# Patient Record
Sex: FEMALE | Race: WHITE | NOT HISPANIC OR LATINO | Employment: OTHER | URBAN - METROPOLITAN AREA
[De-identification: names, ages, dates, MRNs, and addresses within clinical notes are randomized per-mention and may not be internally consistent; named-entity substitution may affect disease eponyms.]

---

## 2023-06-08 DIAGNOSIS — F32.A DEPRESSION, UNSPECIFIED DEPRESSION TYPE: Primary | ICD-10-CM

## 2023-06-08 RX ORDER — PAROXETINE HYDROCHLORIDE 20 MG/1
TABLET, FILM COATED ORAL
Qty: 135 TABLET | Refills: 0 | Status: SHIPPED | OUTPATIENT
Start: 2023-06-08 | End: 2023-10-13

## 2023-06-20 ENCOUNTER — HOSPITAL ENCOUNTER (OUTPATIENT)
Dept: DATA CONVERSION | Facility: HOSPITAL | Age: 88
End: 2023-06-20
Attending: INTERNAL MEDICINE | Admitting: INTERNAL MEDICINE
Payer: MEDICARE

## 2023-06-20 DIAGNOSIS — R19.7 DIARRHEA, UNSPECIFIED: ICD-10-CM

## 2023-06-20 DIAGNOSIS — I25.2 OLD MYOCARDIAL INFARCTION: ICD-10-CM

## 2023-06-20 DIAGNOSIS — K57.30 DIVERTICULOSIS OF LARGE INTESTINE WITHOUT PERFORATION OR ABSCESS WITHOUT BLEEDING: ICD-10-CM

## 2023-06-20 DIAGNOSIS — I10 ESSENTIAL (PRIMARY) HYPERTENSION: ICD-10-CM

## 2023-06-20 DIAGNOSIS — E11.9 TYPE 2 DIABETES MELLITUS WITHOUT COMPLICATIONS (MULTI): ICD-10-CM

## 2023-06-20 DIAGNOSIS — E07.9 DISORDER OF THYROID, UNSPECIFIED: ICD-10-CM

## 2023-06-20 DIAGNOSIS — E78.00 PURE HYPERCHOLESTEROLEMIA, UNSPECIFIED: ICD-10-CM

## 2023-06-20 DIAGNOSIS — K64.4 RESIDUAL HEMORRHOIDAL SKIN TAGS: ICD-10-CM

## 2023-06-20 DIAGNOSIS — C18.2 MALIGNANT NEOPLASM OF ASCENDING COLON (MULTI): ICD-10-CM

## 2023-06-20 DIAGNOSIS — R10.32 LEFT LOWER QUADRANT PAIN: ICD-10-CM

## 2023-06-20 DIAGNOSIS — K63.3 ULCER OF INTESTINE: ICD-10-CM

## 2023-06-20 LAB — POCT GLUCOSE: 113 MG/DL (ref 74–99)

## 2023-06-27 LAB
COMPLETE PATHOLOGY REPORT: NORMAL
CONVERTED ADDENDUM DIAGNOSIS 2: NORMAL
CONVERTED ADDENDUM DIAGNOSIS 3: NORMAL
CONVERTED CLINICAL DIAGNOSIS-HISTORY: NORMAL
CONVERTED FINAL DIAGNOSIS: NORMAL
CONVERTED FINAL REPORT PDF LINK TO COPY AND PASTE: NORMAL
CONVERTED GROSS DESCRIPTION: NORMAL
CONVERTED PHYSICIAN NOTIFICATION: NORMAL

## 2023-08-03 DIAGNOSIS — E03.9 HYPOTHYROIDISM, UNSPECIFIED TYPE: ICD-10-CM

## 2023-08-03 RX ORDER — LEVOTHYROXINE SODIUM 112 UG/1
TABLET ORAL
Qty: 90 TABLET | Refills: 3 | Status: SHIPPED | OUTPATIENT
Start: 2023-08-03

## 2023-08-23 LAB
ALANINE AMINOTRANSFERASE (SGPT) (U/L) IN SER/PLAS: 11 U/L (ref 7–45)
ALBUMIN (G/DL) IN SER/PLAS: 4.2 G/DL (ref 3.4–5)
ALKALINE PHOSPHATASE (U/L) IN SER/PLAS: 84 U/L (ref 33–136)
ANION GAP IN SER/PLAS: 13 MMOL/L (ref 10–20)
ASPARTATE AMINOTRANSFERASE (SGOT) (U/L) IN SER/PLAS: 19 U/L (ref 9–39)
BILIRUBIN TOTAL (MG/DL) IN SER/PLAS: 0.3 MG/DL (ref 0–1.2)
CALCIUM (MG/DL) IN SER/PLAS: 10.5 MG/DL (ref 8.6–10.3)
CARBON DIOXIDE, TOTAL (MMOL/L) IN SER/PLAS: 29 MMOL/L (ref 21–32)
CARCINOEMBRYONIC AG (NG/ML) IN SER/PLAS: 2.3 UG/L
CHLORIDE (MMOL/L) IN SER/PLAS: 104 MMOL/L (ref 98–107)
CREATININE (MG/DL) IN SER/PLAS: 0.78 MG/DL (ref 0.5–1.05)
GFR FEMALE: 69 ML/MIN/1.73M2
GLUCOSE (MG/DL) IN SER/PLAS: 129 MG/DL (ref 74–99)
POTASSIUM (MMOL/L) IN SER/PLAS: 4.9 MMOL/L (ref 3.5–5.3)
PROTEIN TOTAL: 7.1 G/DL (ref 6.4–8.2)
SODIUM (MMOL/L) IN SER/PLAS: 141 MMOL/L (ref 136–145)
UREA NITROGEN (MG/DL) IN SER/PLAS: 14 MG/DL (ref 6–23)

## 2023-09-07 VITALS — WEIGHT: 124.12 LBS | HEIGHT: 66 IN | BODY MASS INDEX: 19.95 KG/M2

## 2023-10-06 NOTE — PROGRESS NOTES
FRANK Baugh is a 89 yo female who underwent a colonoscopy with Dr. Whittington on 6/20/23. She was found to have a likely malignant tumor in the proximal ascending colon. Path demonstrating invasive poorly differentiated adenocarcinoma. PMS-2 and MLH-1 absent. Colonoscopy was completed for diarrhea.     She presents today to discuss surgery that is scheduled for next week. Unfortunately her son in law passed away.     CEA 8/23/23: 2.3    CT chest 8/31/23: Colon carcinoma restaging. No metastatic disease to the lungs. Prominent right hilar lymph node.    CT a/p 7/30/23: Mild wall thickening of the distal descending and sigmoid colon, concerning for mild colitis. There is slight urothelial thickening/enhancement which is nonspecific, though may be seen with urinary tract infection/inflammation. Please correlate with urinalysis. Colonic diverticulosis without evidence of diverticulitis. Mild intrahepatic and extrahepatic biliary ductal dilation unchanged from 11/02/2022, likely secondary to prior cholecystectomy. 0.8 cm enhancing lymph node adjacent to the sigmoid colon not substantially changed from 11/02/2022, uncertain clinical significance.    Colonoscopy 6/20/23 (Pk): Likely malignant tumor in the proximal ascending colon. Biopsied. 5cms distal to the cecum. Diverticulosis in the sigmoid colon. External hemorrhoids. The sigmoid colon, descending colon and transverse colon are normal. Biopsied. The examination was otherwise normal on direct and retroflexion views. Path demonstrating invasive poorly differentiated adenocarcinoma. Positive for MLH1 promoter methylation.     Review of Systems   Constitutional:  Negative for activity change, appetite change, chills, fatigue, fever and unexpected weight change.   Respiratory:  Negative for cough, choking, chest tightness, shortness of breath and wheezing.    Cardiovascular:  Negative for chest pain, palpitations and leg swelling.   Gastrointestinal:  Negative for  abdominal distention, abdominal pain, anal bleeding, blood in stool, constipation, diarrhea, nausea, rectal pain and vomiting.   Genitourinary:  Negative for difficulty urinating, dysuria, frequency and hematuria.   Neurological:  Negative for dizziness, weakness and light-headedness.   Psychiatric/Behavioral:  Negative for agitation.      Objective   Physical Exam  Constitutional:       General: She is not in acute distress.     Appearance: Normal appearance. She is not ill-appearing.   HENT:      Head: Normocephalic.      Mouth/Throat:      Mouth: Mucous membranes are moist.   Eyes:      Extraocular Movements: Extraocular movements intact.      Pupils: Pupils are equal, round, and reactive to light.   Cardiovascular:      Rate and Rhythm: Normal rate and regular rhythm.      Pulses: Normal pulses.      Heart sounds: Normal heart sounds. No murmur heard.  Pulmonary:      Effort: No respiratory distress.      Breath sounds: No wheezing, rhonchi or rales.   Chest:      Chest wall: No tenderness.   Abdominal:      General: There is no distension.      Palpations: There is no mass.      Tenderness: There is no abdominal tenderness. There is no guarding or rebound.      Hernia: No hernia is present.   Genitourinary:     Rectum: Normal.   Musculoskeletal:         General: No swelling or deformity.      Cervical back: No rigidity.      Right lower leg: No edema.      Left lower leg: No edema.   Skin:     General: Skin is warm.      Coloration: Skin is not jaundiced or pale.   Neurological:      Mental Status: She is alert.         Assessment/Plan   #Ascending colon adenocarcinoma, invasive poorly differentiated, MLH-1 and PMS-2 deficient     -  For laparoscopic possible open right danette-colectomy  -  R/B/A discussed with patient again  -  Expected perioperative course and plan discussed with patient  -  Patient would like to proceed  -  Scheduled for 10/16/2023  -  Bowel prep day before surgery

## 2023-10-06 NOTE — H&P (VIEW-ONLY)
FRANK Baugh is a 89 yo female who underwent a colonoscopy with Dr. Whittington on 6/20/23. She was found to have a likely malignant tumor in the proximal ascending colon. Path demonstrating invasive poorly differentiated adenocarcinoma. PMS-2 and MLH-1 absent. Colonoscopy was completed for diarrhea.     She presents today to discuss surgery that is scheduled for next week. Unfortunately her son in law passed away.     CEA 8/23/23: 2.3    CT chest 8/31/23: Colon carcinoma restaging. No metastatic disease to the lungs. Prominent right hilar lymph node.    CT a/p 7/30/23: Mild wall thickening of the distal descending and sigmoid colon, concerning for mild colitis. There is slight urothelial thickening/enhancement which is nonspecific, though may be seen with urinary tract infection/inflammation. Please correlate with urinalysis. Colonic diverticulosis without evidence of diverticulitis. Mild intrahepatic and extrahepatic biliary ductal dilation unchanged from 11/02/2022, likely secondary to prior cholecystectomy. 0.8 cm enhancing lymph node adjacent to the sigmoid colon not substantially changed from 11/02/2022, uncertain clinical significance.    Colonoscopy 6/20/23 (Pk): Likely malignant tumor in the proximal ascending colon. Biopsied. 5cms distal to the cecum. Diverticulosis in the sigmoid colon. External hemorrhoids. The sigmoid colon, descending colon and transverse colon are normal. Biopsied. The examination was otherwise normal on direct and retroflexion views. Path demonstrating invasive poorly differentiated adenocarcinoma. Positive for MLH1 promoter methylation.     Review of Systems   Constitutional:  Negative for activity change, appetite change, chills, fatigue, fever and unexpected weight change.   Respiratory:  Negative for cough, choking, chest tightness, shortness of breath and wheezing.    Cardiovascular:  Negative for chest pain, palpitations and leg swelling.   Gastrointestinal:  Negative for  abdominal distention, abdominal pain, anal bleeding, blood in stool, constipation, diarrhea, nausea, rectal pain and vomiting.   Genitourinary:  Negative for difficulty urinating, dysuria, frequency and hematuria.   Neurological:  Negative for dizziness, weakness and light-headedness.   Psychiatric/Behavioral:  Negative for agitation.      Objective   Physical Exam  Constitutional:       General: She is not in acute distress.     Appearance: Normal appearance. She is not ill-appearing.   HENT:      Head: Normocephalic.      Mouth/Throat:      Mouth: Mucous membranes are moist.   Eyes:      Extraocular Movements: Extraocular movements intact.      Pupils: Pupils are equal, round, and reactive to light.   Cardiovascular:      Rate and Rhythm: Normal rate and regular rhythm.      Pulses: Normal pulses.      Heart sounds: Normal heart sounds. No murmur heard.  Pulmonary:      Effort: No respiratory distress.      Breath sounds: No wheezing, rhonchi or rales.   Chest:      Chest wall: No tenderness.   Abdominal:      General: There is no distension.      Palpations: There is no mass.      Tenderness: There is no abdominal tenderness. There is no guarding or rebound.      Hernia: No hernia is present.   Genitourinary:     Rectum: Normal.   Musculoskeletal:         General: No swelling or deformity.      Cervical back: No rigidity.      Right lower leg: No edema.      Left lower leg: No edema.   Skin:     General: Skin is warm.      Coloration: Skin is not jaundiced or pale.   Neurological:      Mental Status: She is alert.         Assessment/Plan   #Ascending colon adenocarcinoma, invasive poorly differentiated, MLH-1 and PMS-2 deficient     -  For laparoscopic possible open right danette-colectomy  -  R/B/A discussed with patient again  -  Expected perioperative course and plan discussed with patient  -  Patient would like to proceed  -  Scheduled for 10/16/2023  -  Bowel prep day before surgery

## 2023-10-08 ENCOUNTER — PREP FOR PROCEDURE (OUTPATIENT)
Dept: OTOLARYNGOLOGY | Facility: HOSPITAL | Age: 88
End: 2023-10-08
Payer: MEDICARE

## 2023-10-08 DIAGNOSIS — C18.2 MALIGNANT NEOPLASM OF ASCENDING COLON (MULTI): Primary | ICD-10-CM

## 2023-10-08 RX ORDER — METRONIDAZOLE 500 MG/100ML
500 INJECTION, SOLUTION INTRAVENOUS ONCE
Status: CANCELLED | OUTPATIENT
Start: 2023-10-16

## 2023-10-08 RX ORDER — SODIUM CHLORIDE, SODIUM LACTATE, POTASSIUM CHLORIDE, CALCIUM CHLORIDE 600; 310; 30; 20 MG/100ML; MG/100ML; MG/100ML; MG/100ML
100 INJECTION, SOLUTION INTRAVENOUS CONTINUOUS
Status: CANCELLED | OUTPATIENT
Start: 2023-10-16

## 2023-10-08 RX ORDER — CEFAZOLIN SODIUM 2 G/100ML
2 INJECTION, SOLUTION INTRAVENOUS ONCE
Status: CANCELLED | OUTPATIENT
Start: 2023-10-16 | End: 2023-10-08

## 2023-10-08 RX ORDER — CEFAZOLIN SODIUM 2 G/100ML
2 INJECTION, SOLUTION INTRAVENOUS EVERY 8 HOURS
Status: CANCELLED | OUTPATIENT
Start: 2023-10-16

## 2023-10-09 PROBLEM — E03.9 ADULT HYPOTHYROIDISM: Status: ACTIVE | Noted: 2023-10-09

## 2023-10-09 PROBLEM — H81.10 BPPV (BENIGN PAROXYSMAL POSITIONAL VERTIGO): Status: ACTIVE | Noted: 2023-10-09

## 2023-10-09 PROBLEM — G89.29 CHRONIC PAIN OF TOE OF RIGHT FOOT: Status: ACTIVE | Noted: 2023-10-09

## 2023-10-09 PROBLEM — N32.9 BLADDER DISORDER: Status: ACTIVE | Noted: 2023-10-09

## 2023-10-09 PROBLEM — C18.2 MALIGNANT NEOPLASM OF ASCENDING COLON (MULTI): Status: ACTIVE | Noted: 2023-10-08

## 2023-10-09 PROBLEM — E78.1 ESSENTIAL HYPERTRIGLYCERIDEMIA: Status: ACTIVE | Noted: 2023-10-09

## 2023-10-09 PROBLEM — R35.0 INCREASED URINARY FREQUENCY: Status: ACTIVE | Noted: 2023-10-09

## 2023-10-09 PROBLEM — Z95.5 HISTORY OF CORONARY ARTERY STENT PLACEMENT: Status: ACTIVE | Noted: 2023-10-09

## 2023-10-09 PROBLEM — Z78.0 HISTORY OF MENOPAUSE: Status: ACTIVE | Noted: 2023-10-09

## 2023-10-09 PROBLEM — M41.9 SCOLIOSIS OF THORACOLUMBAR SPINE: Status: ACTIVE | Noted: 2023-10-09

## 2023-10-09 PROBLEM — R82.90 ABNORMAL URINE FINDINGS: Status: ACTIVE | Noted: 2023-10-09

## 2023-10-09 PROBLEM — K63.89 MASS OF COLON: Status: ACTIVE | Noted: 2023-10-09

## 2023-10-09 PROBLEM — G89.29 CHRONIC LOW BACK PAIN: Status: ACTIVE | Noted: 2023-10-09

## 2023-10-09 PROBLEM — E11.9 DIABETES MELLITUS (MULTI): Status: ACTIVE | Noted: 2023-10-09

## 2023-10-09 PROBLEM — I10 ESSENTIAL HYPERTENSION: Status: ACTIVE | Noted: 2023-10-09

## 2023-10-09 PROBLEM — E78.5 HYPERLIPIDEMIA: Status: ACTIVE | Noted: 2023-10-09

## 2023-10-09 PROBLEM — I25.10 CAD (CORONARY ARTERY DISEASE): Status: ACTIVE | Noted: 2023-10-09

## 2023-10-09 PROBLEM — F41.1 GENERALIZED ANXIETY DISORDER: Status: ACTIVE | Noted: 2023-10-09

## 2023-10-09 PROBLEM — M54.50 CHRONIC LOW BACK PAIN: Status: ACTIVE | Noted: 2023-10-09

## 2023-10-09 PROBLEM — M12.9 ARTHROPATHY: Status: ACTIVE | Noted: 2023-10-09

## 2023-10-09 PROBLEM — E83.52 HYPERCALCEMIA: Status: ACTIVE | Noted: 2023-10-09

## 2023-10-09 PROBLEM — E55.9 VITAMIN D DEFICIENCY: Status: ACTIVE | Noted: 2023-10-09

## 2023-10-09 PROBLEM — B35.1 FUNGAL NAIL INFECTION: Status: ACTIVE | Noted: 2023-10-09

## 2023-10-09 PROBLEM — R32 INCONTINENCE IN FEMALE: Status: ACTIVE | Noted: 2023-10-09

## 2023-10-09 PROBLEM — H91.90 HEARING LOSS: Status: ACTIVE | Noted: 2023-10-09

## 2023-10-09 PROBLEM — R92.8 ABNORMAL MAMMOGRAM: Status: ACTIVE | Noted: 2023-10-09

## 2023-10-09 PROBLEM — M79.674 CHRONIC PAIN OF TOE OF RIGHT FOOT: Status: ACTIVE | Noted: 2023-10-09

## 2023-10-09 RX ORDER — BLOOD SUGAR DIAGNOSTIC
STRIP MISCELLANEOUS 2 TIMES DAILY
COMMUNITY
Start: 2021-05-10 | End: 2023-10-13 | Stop reason: ENTERED-IN-ERROR

## 2023-10-09 RX ORDER — INSULIN GLARGINE 100 [IU]/ML
6 INJECTION, SOLUTION SUBCUTANEOUS NIGHTLY
COMMUNITY
Start: 2019-09-19

## 2023-10-09 RX ORDER — CARVEDILOL 3.12 MG/1
3.12 TABLET ORAL
COMMUNITY
End: 2023-10-19 | Stop reason: HOSPADM

## 2023-10-09 RX ORDER — MULTIVIT-MINERALS/FOLIC ACID 200 MCG
1 TABLET,CHEWABLE ORAL DAILY
COMMUNITY

## 2023-10-09 RX ORDER — GABAPENTIN 100 MG/1
CAPSULE ORAL
Qty: 3 CAPSULE | Refills: 0 | Status: CANCELLED | OUTPATIENT
Start: 2023-10-09

## 2023-10-09 RX ORDER — PIOGLITAZONEHYDROCHLORIDE 15 MG/1
15 TABLET ORAL DAILY
COMMUNITY
End: 2023-10-13 | Stop reason: ENTERED-IN-ERROR

## 2023-10-09 RX ORDER — POLYETHYLENE GLYCOL 3350, SODIUM CHLORIDE, SODIUM BICARBONATE, POTASSIUM CHLORIDE 420; 11.2; 5.72; 1.48 G/4L; G/4L; G/4L; G/4L
POWDER, FOR SOLUTION ORAL
COMMUNITY
Start: 2023-05-18 | End: 2023-10-13 | Stop reason: ENTERED-IN-ERROR

## 2023-10-09 RX ORDER — ASPIRIN 81 MG/1
1 TABLET ORAL DAILY
COMMUNITY
Start: 2018-09-14

## 2023-10-09 RX ORDER — ATORVASTATIN CALCIUM 10 MG/1
10 TABLET, FILM COATED ORAL EVERY EVENING
COMMUNITY
Start: 2018-09-14

## 2023-10-09 RX ORDER — PEN NEEDLE, DIABETIC 32GX 5/32"
1 NEEDLE, DISPOSABLE MISCELLANEOUS DAILY
COMMUNITY
Start: 2023-07-08 | End: 2023-10-13 | Stop reason: ENTERED-IN-ERROR

## 2023-10-09 RX ORDER — LINAGLIPTIN 5 MG/1
1 TABLET, FILM COATED ORAL DAILY
COMMUNITY
Start: 2022-04-07 | End: 2023-10-23 | Stop reason: ALTCHOICE

## 2023-10-09 RX ORDER — MECLIZINE HCL 12.5 MG 12.5 MG/1
12.5 TABLET ORAL EVERY 12 HOURS PRN
COMMUNITY
Start: 2020-05-06 | End: 2023-11-05

## 2023-10-09 RX ORDER — VIT C/E/ZN/COPPR/LUTEIN/ZEAXAN 250MG-90MG
CAPSULE ORAL DAILY
COMMUNITY
End: 2023-10-13 | Stop reason: ENTERED-IN-ERROR

## 2023-10-09 RX ORDER — LISINOPRIL 2.5 MG/1
2.5 TABLET ORAL DAILY
COMMUNITY
End: 2023-10-27 | Stop reason: DRUGHIGH

## 2023-10-09 RX ORDER — CHOLECALCIFEROL (VITAMIN D3) 50 MCG
1 TABLET ORAL DAILY
COMMUNITY

## 2023-10-09 RX ORDER — OXYBUTYNIN CHLORIDE 5 MG/1
1 TABLET, EXTENDED RELEASE ORAL DAILY
COMMUNITY
Start: 2022-05-17 | End: 2023-10-13 | Stop reason: ENTERED-IN-ERROR

## 2023-10-10 ENCOUNTER — OFFICE VISIT (OUTPATIENT)
Dept: SURGERY | Facility: CLINIC | Age: 88
End: 2023-10-10
Payer: MEDICARE

## 2023-10-10 VITALS — SYSTOLIC BLOOD PRESSURE: 122 MMHG | DIASTOLIC BLOOD PRESSURE: 75 MMHG | HEART RATE: 77 BPM

## 2023-10-10 DIAGNOSIS — K63.89 COLONIC MASS: ICD-10-CM

## 2023-10-10 DIAGNOSIS — E46 PROTEIN-CALORIE MALNUTRITION, UNSPECIFIED SEVERITY (MULTI): ICD-10-CM

## 2023-10-10 DIAGNOSIS — C18.2 MALIGNANT NEOPLASM OF ASCENDING COLON (MULTI): Primary | ICD-10-CM

## 2023-10-10 PROCEDURE — 3074F SYST BP LT 130 MM HG: CPT | Performed by: STUDENT IN AN ORGANIZED HEALTH CARE EDUCATION/TRAINING PROGRAM

## 2023-10-10 PROCEDURE — 3078F DIAST BP <80 MM HG: CPT | Performed by: STUDENT IN AN ORGANIZED HEALTH CARE EDUCATION/TRAINING PROGRAM

## 2023-10-10 PROCEDURE — 1159F MED LIST DOCD IN RCRD: CPT | Performed by: STUDENT IN AN ORGANIZED HEALTH CARE EDUCATION/TRAINING PROGRAM

## 2023-10-10 PROCEDURE — 99214 OFFICE O/P EST MOD 30 MIN: CPT | Performed by: STUDENT IN AN ORGANIZED HEALTH CARE EDUCATION/TRAINING PROGRAM

## 2023-10-10 RX ORDER — NEOMYCIN SULFATE 500 MG/1
TABLET ORAL
Qty: 6 TABLET | Refills: 0 | Status: SHIPPED | OUTPATIENT
Start: 2023-10-10 | End: 2023-10-19 | Stop reason: HOSPADM

## 2023-10-10 RX ORDER — METRONIDAZOLE 250 MG/1
TABLET ORAL
Qty: 3 TABLET | Refills: 0 | Status: SHIPPED | OUTPATIENT
Start: 2023-10-10 | End: 2023-10-19 | Stop reason: HOSPADM

## 2023-10-10 ASSESSMENT — ENCOUNTER SYMPTOMS
APPETITE CHANGE: 0
ABDOMINAL DISTENTION: 0
HEMATURIA: 0
CHEST TIGHTNESS: 0
DIARRHEA: 0
DIZZINESS: 0
PALPITATIONS: 0
FREQUENCY: 0
DIFFICULTY URINATING: 0
DYSURIA: 0
CONSTIPATION: 0
SHORTNESS OF BREATH: 0
WHEEZING: 0
ACTIVITY CHANGE: 0
AGITATION: 0
ABDOMINAL PAIN: 0
LIGHT-HEADEDNESS: 0
UNEXPECTED WEIGHT CHANGE: 0
BLOOD IN STOOL: 0
ANAL BLEEDING: 0
NAUSEA: 0
FATIGUE: 0
CHILLS: 0
COUGH: 0
FEVER: 0
VOMITING: 0
CHOKING: 0
RECTAL PAIN: 0
WEAKNESS: 0

## 2023-10-11 DIAGNOSIS — F32.A DEPRESSION, UNSPECIFIED DEPRESSION TYPE: ICD-10-CM

## 2023-10-13 ENCOUNTER — LAB (OUTPATIENT)
Dept: LAB | Facility: LAB | Age: 88
End: 2023-10-13
Payer: MEDICARE

## 2023-10-13 ENCOUNTER — ANCILLARY PROCEDURE (OUTPATIENT)
Dept: PREADMISSION TESTING | Facility: HOSPITAL | Age: 88
DRG: 331 | End: 2023-10-13
Payer: MEDICARE

## 2023-10-13 VITALS
SYSTOLIC BLOOD PRESSURE: 117 MMHG | RESPIRATION RATE: 15 BRPM | BODY MASS INDEX: 18.71 KG/M2 | TEMPERATURE: 96.8 F | WEIGHT: 116.4 LBS | HEART RATE: 78 BPM | DIASTOLIC BLOOD PRESSURE: 48 MMHG | OXYGEN SATURATION: 98 % | HEIGHT: 66 IN

## 2023-10-13 DIAGNOSIS — C18.2 MALIGNANT NEOPLASM OF ASCENDING COLON (MULTI): ICD-10-CM

## 2023-10-13 DIAGNOSIS — Z01.818 PREOP TESTING: Primary | ICD-10-CM

## 2023-10-13 LAB
ALBUMIN SERPL BCP-MCNC: 3.8 G/DL (ref 3.4–5)
ALP SERPL-CCNC: 73 U/L (ref 33–136)
ALT SERPL W P-5'-P-CCNC: 11 U/L (ref 7–45)
ANION GAP SERPL CALC-SCNC: 10 MMOL/L (ref 10–20)
AST SERPL W P-5'-P-CCNC: 14 U/L (ref 9–39)
BILIRUB SERPL-MCNC: 0.4 MG/DL (ref 0–1.2)
BUN SERPL-MCNC: 19 MG/DL (ref 6–23)
CALCIUM SERPL-MCNC: 10.3 MG/DL (ref 8.6–10.3)
CHLORIDE SERPL-SCNC: 103 MMOL/L (ref 98–107)
CO2 SERPL-SCNC: 30 MMOL/L (ref 21–32)
CREAT SERPL-MCNC: 0.7 MG/DL (ref 0.5–1.05)
ERYTHROCYTE [DISTWIDTH] IN BLOOD BY AUTOMATED COUNT: 13.3 % (ref 11.5–14.5)
GFR SERPL CREATININE-BSD FRML MDRD: 79 ML/MIN/1.73M*2
GLUCOSE SERPL-MCNC: 119 MG/DL (ref 74–99)
HCT VFR BLD AUTO: 37.6 % (ref 36–46)
HGB BLD-MCNC: 11.8 G/DL (ref 12–16)
MCH RBC QN AUTO: 30.2 PG (ref 26–34)
MCHC RBC AUTO-ENTMCNC: 31.4 G/DL (ref 32–36)
MCV RBC AUTO: 96 FL (ref 80–100)
NRBC BLD-RTO: 0 /100 WBCS (ref 0–0)
PLATELET # BLD AUTO: 386 X10*3/UL (ref 150–450)
PMV BLD AUTO: 10.1 FL (ref 7.5–11.5)
POTASSIUM SERPL-SCNC: 4.7 MMOL/L (ref 3.5–5.3)
PROT SERPL-MCNC: 6.1 G/DL (ref 6.4–8.2)
RBC # BLD AUTO: 3.91 X10*6/UL (ref 4–5.2)
SODIUM SERPL-SCNC: 138 MMOL/L (ref 136–145)
WBC # BLD AUTO: 7.4 X10*3/UL (ref 4.4–11.3)

## 2023-10-13 PROCEDURE — 87081 CULTURE SCREEN ONLY: CPT | Mod: STJLAB | Performed by: NURSE PRACTITIONER

## 2023-10-13 PROCEDURE — 93005 ELECTROCARDIOGRAM TRACING: CPT

## 2023-10-13 PROCEDURE — 93010 ELECTROCARDIOGRAM REPORT: CPT | Performed by: INTERNAL MEDICINE

## 2023-10-13 PROCEDURE — 99202 OFFICE O/P NEW SF 15 MIN: CPT | Performed by: NURSE PRACTITIONER

## 2023-10-13 PROCEDURE — 85027 COMPLETE CBC AUTOMATED: CPT

## 2023-10-13 PROCEDURE — 80053 COMPREHEN METABOLIC PANEL: CPT

## 2023-10-13 PROCEDURE — 36415 COLL VENOUS BLD VENIPUNCTURE: CPT

## 2023-10-13 RX ORDER — CHLORHEXIDINE GLUCONATE ORAL RINSE 1.2 MG/ML
SOLUTION DENTAL
Qty: 473 ML | Refills: 0 | Status: SHIPPED | OUTPATIENT
Start: 2023-10-13 | End: 2023-10-19 | Stop reason: HOSPADM

## 2023-10-13 RX ORDER — PAROXETINE HYDROCHLORIDE 20 MG/1
30 TABLET, FILM COATED ORAL DAILY
Qty: 135 TABLET | Refills: 0 | Status: SHIPPED | OUTPATIENT
Start: 2023-10-13 | End: 2024-03-21

## 2023-10-13 ASSESSMENT — ENCOUNTER SYMPTOMS
PSYCHIATRIC NEGATIVE: 1
NEUROLOGICAL NEGATIVE: 1
BLOOD IN STOOL: 0
VOMITING: 1
CONSTITUTIONAL NEGATIVE: 1
RESPIRATORY NEGATIVE: 1
ARTHRALGIAS: 1
EYES NEGATIVE: 1
ABDOMINAL PAIN: 1
CARDIOVASCULAR NEGATIVE: 1
DIARRHEA: 1
TROUBLE SWALLOWING: 0

## 2023-10-13 ASSESSMENT — DUKE ACTIVITY SCORE INDEX (DASI)
CAN YOU CLIMB A FLIGHT OF STAIRS OR WALK UP A HILL: YES
CAN YOU TAKE CARE OF YOURSELF (EAT, DRESS, BATHE, OR USE TOILET): YES
CAN YOU DO LIGHT WORK AROUND THE HOUSE LIKE DUSTING OR WASHING DISHES: YES
CAN YOU DO YARD WORK LIKE RAKING LEAVES, WEEDING OR PUSHING A MOWER: NO
CAN YOU HAVE SEXUAL RELATIONS: NO
CAN YOU PARTICIPATE IN STRENOUS SPORTS LIKE SWIMMING, SINGLES TENNIS, FOOTBALL, BASKETBALL, OR SKIING: NO
TOTAL_SCORE: 12.7
DASI METS SCORE: 4.3
CAN YOU PARTICIPATE IN MODERATE RECREATIONAL ACTIVITIES LIKE GOLF, BOWLING, DANCING, DOUBLES TENNIS OR THROWING A BASEBALL OR FOOTBALL: NO
CAN YOU DO HEAVY WORK AROUND THE HOUSE LIKE SCRUBBING FLOORS OR LIFTING AND MOVING HEAVY FURNITURE: NO
CAN YOU WALK INDOORS, SUCH AS AROUND YOUR HOUSE: YES
CAN YOU WALK A BLOCK OR TWO ON LEVEL GROUND: NO
CAN YOU DO MODERATE WORK AROUND THE HOUSE LIKE VACUUMING, SWEEPING FLOORS OR CARRYING GROCERIES: NO
CAN YOU RUN A SHORT DISTANCE: NO

## 2023-10-13 ASSESSMENT — LIFESTYLE VARIABLES: SMOKING_STATUS: NONSMOKER

## 2023-10-13 ASSESSMENT — CHADS2 SCORE
PRIOR STROKE OR TIA OR THROMBOEMBOLISM: NO
DIABETES: YES
CHADS2 SCORE: 3
HYPERTENSION: YES
AGE GREATER THAN OR EQUAL TO 75: YES
CHF: NO

## 2023-10-13 ASSESSMENT — ACTIVITIES OF DAILY LIVING (ADL): ADL_SCORE: 0

## 2023-10-13 NOTE — PREPROCEDURE INSTRUCTIONS
No outpatient medications have been marked as taking for the 10/13/23 encounter (Clinical Support) with [facility].                                  PRE-OPERATIVE INSTRUCTIONS    You will receive notification one business day prior to your surgery to confirm your arrival time and additional information. It is important that you answer your phone and/or check your messages during this time.    Please enter the building through the Outpatient entrance. Take the elevator off the lobby to the 2nd floor and check in at the Outpatient Surgery desk    INSTRUCTIONS:  Talk to your surgeon for instructions if you should stop your aspirin, blood thinner, or diabetes medicines.  DO NOT take any multivitamins or over the counter supplements for 7-10 days before surgery.  If not being admitted, you must have an adult immediately available to drive you home after surgery. We also highly recommend you have someone stay with you for the entire day and night of your surgery.  For children having surgery, a parent or legal guardian must accompany t hem to the surgery center. If this is not possible, please call [phone] to make additional arrangements.  For adults who are unable to consent or make medical decisions for themselves, a legal guardian or Power of  must accompany them to the surgery center. If this is not possible, please call [phone] to make additional arrangements.  Wear comfortable, loose fitting clothing.  All jewelry and piercings must be removed. If you are unable to remove an item or have a dermal piercing, please be sure to tell the nurse when you arrive for surgery.  Nail polish and make-up must be removed.  Avoid smoking or consuming alcohol for 24 hours before surgery.  To help prevent infection, please take a shower/bath and wash your hair the night before and/or morning of surgery.    Additional instructions about eating and drinking before surgery:  Do not eat any solid foods or drink  anything but clear liquids within 6 hours of your arrival time for surgery. Milk, nutritional drinks/supplements, and infant formula are considered solid foods.  You may drink clear liquids up to 2 hours before your arrival time for surgery, unless directed otherwise by your surgeon. Clear liquids include water, non-carbonated sports drinks (Gatorade), black tea or coffee (no creamers) and breast milk.    If you received a blue folder, please review additional information provided inside the folder regarding additional preparation.     If you have any questions or concerns, please call Pre-Admission Testing at [phone].

## 2023-10-13 NOTE — CPM/PAT H&P
CPM/PAT Evaluation       Name: Lupis Jackson (Lupis Jackson)  /Age: 3/14/1926/89 y.o.     In-Person       Chief Complaint: Colon Cancer    HPIOver the last few months, patient with diarrhea approximately 3 days per week and occasional vomiting. Found to have colon cancer on colonoscopy. Has been having some left mild left sided abdominal pain. Patient here with niece. Patient is [place]. Can read lips. Has never had chemo or radiation treatments.    Past Medical History:   Diagnosis Date    Breast cancer (CMS/HCC)     Cataract     Colorectal cancer (CMS/HCC)     Coronary artery disease     Dizziness     Essential (primary) hypertension 2022    Essential hypertension    HL (hearing loss)     Hyperlipidemia, unspecified 2021    Hyperlipidemia    Hypothyroidism     Myocardial infarction (CMS/HCC)     Presence of coronary angioplasty implant and graft 2020    History of coronary artery stent placement    Type 2 diabetes mellitus without complications (CMS/HCC) 12/15/2022    Diabetes mellitus    Vitamin D deficiency, unspecified 2019    Vitamin D deficiency       Past Surgical History:   Procedure Laterality Date    CATARACT EXTRACTION      COLONOSCOPY      OTHER SURGICAL HISTORY  12/10/2018    Right mastectomy       Patient Sexual activity questions deferred to the physician.    Family History   Problem Relation Name Age of Onset    No Known Problems Mother      No Known Problems Father         No Known Allergies    Prior to Admission medications    Medication Sig Start Date End Date Taking? Authorizing Provider   aspirin 81 mg EC tablet Take 1 tablet (81 mg) by mouth once daily. 18   Historical Provider, MD   atorvastatin (Lipitor) 10 mg tablet Take 1 tablet (10 mg) by mouth once daily in the evening. 18   Historical Provider, MD   carvedilol (Coreg) 3.125 mg tablet Take 1 tablet (3.125 mg) by mouth 2 times a day with meals.    Historical Provider, MD   chlorhexidine (Peridex) 0.12 % solution 15  milliliter(s) orally once a day for 2 doses 15 ml  the night before surgery and 15 ml morning of surgery - swish for 30 seconds -DO NOT SWALLOW, SPIT OUT 10/13/23   GIAN Dalal-CNP   cholecalciferol (Vitamin D-3) 50 MCG (2000 UT) tablet Take 1 tablet (50 mcg) by mouth once daily.    Historical Provider, MD   insulin glargine (Lantus U-100 Insulin) 100 unit/mL injection Inject 12 Units under the skin once daily at bedtime. 9/19/19   Historical Provider, MD   levothyroxine (Synthroid, Levoxyl) 112 mcg tablet TAKE ONE TABLET BY MOUTH DAILY 8/3/23   Chirag Licea DO   linaGLIPtin (Tradjenta) 5 mg tablet Take 1 tablet (5 mg) by mouth once daily. 4/7/22   Historical Provider, MD   lisinopril 2.5 mg tablet Take 1 tablet (2.5 mg) by mouth once daily.    Historical Provider, MD   meclizine (Antivert) 12.5 mg tablet Take 1 tablet (12.5 mg) by mouth every 12 hours if needed. 5/6/20   Historical Provider, MD   metroNIDAZOLE (FlagyL) 250 mg tablet Take one tablet by mouth at 6:00pm, 7:00pm and 11:00 on the day prior to surgery 10/10/23   Chirag Cloud MD   mv-calcium-min-iron fm-FA-vitK (One-A-Day Women's Complete,vK,) 18 mg-400 mcg- 25 mcg tablet Take 1 tablet by mouth once daily.    Historical Provider, MD   neomycin (Mycifradin) 500 mg tablet Take two tablets (1000 mg) by mouth at 6:00pm, 7:00pm and 11:00 on the day prior to surgery 10/10/23   Chirag Cloud MD   OneTouch Ultra Test strip twice a day. 5/10/21   Historical Provider, MD   oxybutynin XL (Ditropan-XL) 5 mg 24 hr tablet Take 1 tablet (5 mg) by mouth once daily. 5/17/22   Historical Provider, MD   PARoxetine (Paxil) 20 mg tablet TAKE 1 & 1/2 TABLETS BY MOUTH DAILY 6/8/23   GIAN Ramirez-CNP   pioglitazone (Actos) 15 mg tablet Take 1 tablet (15 mg) by mouth once daily.    Historical Provider, MD   polyethylene glycol-electrolytes 420 gram solution TAKE AS DIRECTED. 5/18/23   Historical Provider, MD   Sure Comfort Pen Needle 32 gauge x  "5/32\" needle 1 each once daily. 7/8/23   Historical Provider, MD   vit C,X-Mq-dnyak-lutein-zeaxan (PreserVision AREDS-2) 250-90-40-1 mg capsule Take by mouth once daily.    Historical Provider, MD        Review of Systems   Constitutional: Negative.    HENT:  Positive for dental problem (dentures) and hearing loss (hearing aids currently not working). Negative for trouble swallowing.    Eyes: Negative.         Glasses   Respiratory: Negative.     Cardiovascular: Negative.    Gastrointestinal:  Positive for abdominal pain, diarrhea and vomiting. Negative for blood in stool.   Genitourinary: Negative.    Musculoskeletal:  Positive for arthralgias.   Skin: Negative.    Neurological: Negative.    Psychiatric/Behavioral: Negative.          Physical Exam  Constitutional:       Appearance: Normal appearance.   HENT:      Head: Normocephalic.   Eyes:      Extraocular Movements: Extraocular movements intact.   Cardiovascular:      Rate and Rhythm: Normal rate and regular rhythm.      Heart sounds: Normal heart sounds.   Pulmonary:      Effort: Pulmonary effort is normal.      Breath sounds: Normal breath sounds.   Abdominal:      General: Bowel sounds are normal.      Palpations: Abdomen is soft.   Musculoskeletal:         General: Normal range of motion.      Cervical back: Normal range of motion.   Skin:     General: Skin is warm and dry.   Neurological:      Mental Status: She is alert and oriented to person, place, and time.   Psychiatric:         Mood and Affect: Mood normal.          PAT AIRWAY:   Airway:     Neck ROM::  Full   Full upper dentures, partial lower dentures      Anesthesia:  Patient denies any anesthesia complications.     Visit Vitals  BP (!) 117/48   Pulse 78   Temp 36 °C (96.8 °F) (Temporal)   Resp 15       DASI Risk Score      Flowsheet Row Most Recent Value   DASI SCORE 12.7   METS Score (Will be calculated only when all the questions are answered) 4.3          Caprini DVT Assessment      Flowsheet " Row Most Recent Value   DVT Score 7   Current Status Major surgery planned, lasting 2-3 hours   History Acute myocardial infarction   Age Over 75 years   BMI 30 or less          Modified Frailty Index      Flowsheet Row Most Recent Value   Modified Frailty Index Calculator .2727          CHADS2 Stroke Risk  Current as of 7 minutes ago        N/A 3 - 100%: High Risk   2 - 3%: Medium Risk   0 - 2%: Low Risk     Last Change: N/A          This score determines the patient's risk of having a stroke if the patient has atrial fibrillation.        This score is not applicable to this patient. Components are not calculated.          Revised Cardiac Risk Index      Flowsheet Row Most Recent Value   Revised Cardiac Risk Calculator 2          Apfel Simplified Score      Flowsheet Row Most Recent Value   Apfel Simplified Score Calculator 3          Risk Analysis Index Results This Encounter         10/13/2023  0924             FIGUEROA Cancer History: Patient indicates history of cancer    Total Risk Analysis Index Score Without Cancer: 32    Total Risk Analysis Index Score: 37          Stop Bang Score      Flowsheet Row Most Recent Value   Do you snore loudly? 0   Do you often feel tired or fatigued after your sleep? 0   Has anyone ever observed you stop breathing in your sleep? 0   Do you have or are you being treated for high blood pressure? 1   Recent BMI (Calculated) 18.8   Is BMI greater than 35 kg/m2? 0=No   Age older than 50 years old? 1=Yes   Is your neck circumference greater than 17 inches (Male) or 16 inches (Female)? 0          This SmartLink has not been configured with any valid records.     Hemoglobin A1C   Date Value Ref Range Status   04/07/2021 6.9 % Final     Comment:          Diagnosis of Diabetes-Adults   Non-Diabetic: < or = 5.6%   Increased risk for developing diabetes: 5.7-6.4%   Diagnostic of diabetes: > or = 6.5%  .       Monitoring of Diabetes                Age (y)     Therapeutic Goal (%)   Adults:           >18           <7.0   Pediatrics:    13-18           <7.5                   7-12           <8.0                   0- 6            7.5-8.5   American Diabetes Association. Diabetes Care 33(S1), Jan 2010.           Assessment and Plan:     89 year old female  for resection laparoscopy large intestine 10/16/23  EKG obtained and enclosed. Comparison EKG on file. Patient follows yearly with Dr. Andrews. Cardiology note (July 2022) on file. She is asymptomatic and appears younger than stated age. Has history of MI in 2005 with 3 cardiac stents in place.  Lab work as ordered today  MRSA swab and oral mouth rinse ordered per protocol  She ambulates unassisted  Diabetic; blood sugars reported to run around

## 2023-10-15 LAB — STAPHYLOCOCCUS SPEC CULT: NORMAL

## 2023-10-16 ENCOUNTER — HOSPITAL ENCOUNTER (INPATIENT)
Facility: HOSPITAL | Age: 88
LOS: 3 days | Discharge: HOME | DRG: 331 | End: 2023-10-19
Attending: STUDENT IN AN ORGANIZED HEALTH CARE EDUCATION/TRAINING PROGRAM | Admitting: STUDENT IN AN ORGANIZED HEALTH CARE EDUCATION/TRAINING PROGRAM
Payer: MEDICARE

## 2023-10-16 ENCOUNTER — ANESTHESIA EVENT (OUTPATIENT)
Dept: OPERATING ROOM | Facility: HOSPITAL | Age: 88
DRG: 331 | End: 2023-10-16
Payer: MEDICARE

## 2023-10-16 ENCOUNTER — ANESTHESIA (OUTPATIENT)
Dept: OPERATING ROOM | Facility: HOSPITAL | Age: 88
DRG: 331 | End: 2023-10-16
Payer: MEDICARE

## 2023-10-16 DIAGNOSIS — K63.89 MASS OF COLON: ICD-10-CM

## 2023-10-16 DIAGNOSIS — C18.2 MALIGNANT NEOPLASM OF ASCENDING COLON (MULTI): Primary | ICD-10-CM

## 2023-10-16 LAB
ABO GROUP (TYPE) IN BLOOD: NORMAL
ANION GAP SERPL CALC-SCNC: 9 MMOL/L (ref 10–20)
ANTIBODY SCREEN: NORMAL
BUN SERPL-MCNC: 13 MG/DL (ref 6–23)
CALCIUM SERPL-MCNC: 9 MG/DL (ref 8.6–10.3)
CHLORIDE SERPL-SCNC: 102 MMOL/L (ref 98–107)
CO2 SERPL-SCNC: 27 MMOL/L (ref 21–32)
CREAT SERPL-MCNC: 0.71 MG/DL (ref 0.5–1.05)
ERYTHROCYTE [DISTWIDTH] IN BLOOD BY AUTOMATED COUNT: 13.2 % (ref 11.5–14.5)
GFR SERPL CREATININE-BSD FRML MDRD: 77 ML/MIN/1.73M*2
GLUCOSE BLD MANUAL STRIP-MCNC: 141 MG/DL (ref 74–99)
GLUCOSE BLD MANUAL STRIP-MCNC: 166 MG/DL (ref 74–99)
GLUCOSE BLD MANUAL STRIP-MCNC: 174 MG/DL (ref 74–99)
GLUCOSE BLD MANUAL STRIP-MCNC: 211 MG/DL (ref 74–99)
GLUCOSE SERPL-MCNC: 163 MG/DL (ref 74–99)
HCT VFR BLD AUTO: 34.5 % (ref 36–46)
HGB BLD-MCNC: 11 G/DL (ref 12–16)
INR PPP: 0.9 (ref 0.9–1.1)
MCH RBC QN AUTO: 30.2 PG (ref 26–34)
MCHC RBC AUTO-ENTMCNC: 31.9 G/DL (ref 32–36)
MCV RBC AUTO: 95 FL (ref 80–100)
NRBC BLD-RTO: 0 /100 WBCS (ref 0–0)
PLATELET # BLD AUTO: 382 X10*3/UL (ref 150–450)
PMV BLD AUTO: 10.5 FL (ref 7.5–11.5)
POTASSIUM SERPL-SCNC: 4.1 MMOL/L (ref 3.5–5.3)
PROTHROMBIN TIME: 10.3 SECONDS (ref 9.8–12.8)
RBC # BLD AUTO: 3.64 X10*6/UL (ref 4–5.2)
RH FACTOR (ANTIGEN D): NORMAL
SODIUM SERPL-SCNC: 134 MMOL/L (ref 136–145)
WBC # BLD AUTO: 8.2 X10*3/UL (ref 4.4–11.3)

## 2023-10-16 PROCEDURE — 86850 RBC ANTIBODY SCREEN: CPT | Performed by: STUDENT IN AN ORGANIZED HEALTH CARE EDUCATION/TRAINING PROGRAM

## 2023-10-16 PROCEDURE — 1200000002 HC GENERAL ROOM WITH TELEMETRY DAILY

## 2023-10-16 PROCEDURE — 7100000001 HC RECOVERY ROOM TIME - INITIAL BASE CHARGE: Performed by: STUDENT IN AN ORGANIZED HEALTH CARE EDUCATION/TRAINING PROGRAM

## 2023-10-16 PROCEDURE — 36415 COLL VENOUS BLD VENIPUNCTURE: CPT | Performed by: STUDENT IN AN ORGANIZED HEALTH CARE EDUCATION/TRAINING PROGRAM

## 2023-10-16 PROCEDURE — 3700000002 HC GENERAL ANESTHESIA TIME - EACH INCREMENTAL 1 MINUTE: Performed by: STUDENT IN AN ORGANIZED HEALTH CARE EDUCATION/TRAINING PROGRAM

## 2023-10-16 PROCEDURE — 2500000004 HC RX 250 GENERAL PHARMACY W/ HCPCS (ALT 636 FOR OP/ED): Performed by: STUDENT IN AN ORGANIZED HEALTH CARE EDUCATION/TRAINING PROGRAM

## 2023-10-16 PROCEDURE — 2580000001 HC RX 258 IV SOLUTIONS: Performed by: STUDENT IN AN ORGANIZED HEALTH CARE EDUCATION/TRAINING PROGRAM

## 2023-10-16 PROCEDURE — 2500000001 HC RX 250 WO HCPCS SELF ADMINISTERED DRUGS (ALT 637 FOR MEDICARE OP): Performed by: STUDENT IN AN ORGANIZED HEALTH CARE EDUCATION/TRAINING PROGRAM

## 2023-10-16 PROCEDURE — 88309 TISSUE EXAM BY PATHOLOGIST: CPT | Mod: TC,STJLAB | Performed by: STUDENT IN AN ORGANIZED HEALTH CARE EDUCATION/TRAINING PROGRAM

## 2023-10-16 PROCEDURE — 96372 THER/PROPH/DIAG INJ SC/IM: CPT | Performed by: STUDENT IN AN ORGANIZED HEALTH CARE EDUCATION/TRAINING PROGRAM

## 2023-10-16 PROCEDURE — 3600000004 HC OR TIME - INITIAL BASE CHARGE - PROCEDURE LEVEL FOUR: Performed by: STUDENT IN AN ORGANIZED HEALTH CARE EDUCATION/TRAINING PROGRAM

## 2023-10-16 PROCEDURE — 85027 COMPLETE CBC AUTOMATED: CPT

## 2023-10-16 PROCEDURE — A44204 PR LAP,SURG,COLECTOMY, PARTIAL, W/ANAST: Performed by: STUDENT IN AN ORGANIZED HEALTH CARE EDUCATION/TRAINING PROGRAM

## 2023-10-16 PROCEDURE — 37799 UNLISTED PX VASCULAR SURGERY: CPT

## 2023-10-16 PROCEDURE — 99100 ANES PT EXTEME AGE<1 YR&>70: CPT | Performed by: STUDENT IN AN ORGANIZED HEALTH CARE EDUCATION/TRAINING PROGRAM

## 2023-10-16 PROCEDURE — 82947 ASSAY GLUCOSE BLOOD QUANT: CPT

## 2023-10-16 PROCEDURE — 3600000009 HC OR TIME - EACH INCREMENTAL 1 MINUTE - PROCEDURE LEVEL FOUR: Performed by: STUDENT IN AN ORGANIZED HEALTH CARE EDUCATION/TRAINING PROGRAM

## 2023-10-16 PROCEDURE — 2500000004 HC RX 250 GENERAL PHARMACY W/ HCPCS (ALT 636 FOR OP/ED): Performed by: ANESTHESIOLOGIST ASSISTANT

## 2023-10-16 PROCEDURE — 2720000007 HC OR 272 NO HCPCS: Performed by: STUDENT IN AN ORGANIZED HEALTH CARE EDUCATION/TRAINING PROGRAM

## 2023-10-16 PROCEDURE — 7100000002 HC RECOVERY ROOM TIME - EACH INCREMENTAL 1 MINUTE: Performed by: STUDENT IN AN ORGANIZED HEALTH CARE EDUCATION/TRAINING PROGRAM

## 2023-10-16 PROCEDURE — 82565 ASSAY OF CREATININE: CPT

## 2023-10-16 PROCEDURE — 88309 TISSUE EXAM BY PATHOLOGIST: CPT

## 2023-10-16 PROCEDURE — 2500000005 HC RX 250 GENERAL PHARMACY W/O HCPCS: Performed by: ANESTHESIOLOGIST ASSISTANT

## 2023-10-16 PROCEDURE — 88309 TISSUE EXAM BY PATHOLOGIST: CPT | Mod: TC,SUR,STJLAB | Performed by: STUDENT IN AN ORGANIZED HEALTH CARE EDUCATION/TRAINING PROGRAM

## 2023-10-16 PROCEDURE — 85610 PROTHROMBIN TIME: CPT | Performed by: STUDENT IN AN ORGANIZED HEALTH CARE EDUCATION/TRAINING PROGRAM

## 2023-10-16 PROCEDURE — A44204 PR LAP,SURG,COLECTOMY, PARTIAL, W/ANAST: Performed by: ANESTHESIOLOGIST ASSISTANT

## 2023-10-16 PROCEDURE — 99024 POSTOP FOLLOW-UP VISIT: CPT | Performed by: STUDENT IN AN ORGANIZED HEALTH CARE EDUCATION/TRAINING PROGRAM

## 2023-10-16 PROCEDURE — 0DTF4ZZ RESECTION OF RIGHT LARGE INTESTINE, PERCUTANEOUS ENDOSCOPIC APPROACH: ICD-10-PCS | Performed by: STUDENT IN AN ORGANIZED HEALTH CARE EDUCATION/TRAINING PROGRAM

## 2023-10-16 PROCEDURE — 36620 INSERTION CATHETER ARTERY: CPT | Performed by: STUDENT IN AN ORGANIZED HEALTH CARE EDUCATION/TRAINING PROGRAM

## 2023-10-16 PROCEDURE — 44204 LAPARO PARTIAL COLECTOMY: CPT | Performed by: STUDENT IN AN ORGANIZED HEALTH CARE EDUCATION/TRAINING PROGRAM

## 2023-10-16 PROCEDURE — 3700000001 HC GENERAL ANESTHESIA TIME - INITIAL BASE CHARGE: Performed by: STUDENT IN AN ORGANIZED HEALTH CARE EDUCATION/TRAINING PROGRAM

## 2023-10-16 RX ORDER — OXYCODONE HYDROCHLORIDE 5 MG/1
5 TABLET ORAL EVERY 6 HOURS PRN
Status: DISCONTINUED | OUTPATIENT
Start: 2023-10-16 | End: 2023-10-19 | Stop reason: HOSPADM

## 2023-10-16 RX ORDER — OXYCODONE HYDROCHLORIDE 5 MG/1
5 TABLET ORAL EVERY 4 HOURS PRN
Status: DISCONTINUED | OUTPATIENT
Start: 2023-10-16 | End: 2023-10-16 | Stop reason: HOSPADM

## 2023-10-16 RX ORDER — NALOXONE HYDROCHLORIDE 0.4 MG/ML
0.2 INJECTION, SOLUTION INTRAMUSCULAR; INTRAVENOUS; SUBCUTANEOUS EVERY 5 MIN PRN
Status: DISCONTINUED | OUTPATIENT
Start: 2023-10-16 | End: 2023-10-19 | Stop reason: HOSPADM

## 2023-10-16 RX ORDER — ROCURONIUM BROMIDE 50 MG/5 ML
SYRINGE (ML) INTRAVENOUS AS NEEDED
Status: DISCONTINUED | OUTPATIENT
Start: 2023-10-16 | End: 2023-10-16

## 2023-10-16 RX ORDER — PAROXETINE HYDROCHLORIDE 20 MG/1
30 TABLET, FILM COATED ORAL DAILY
Status: DISCONTINUED | OUTPATIENT
Start: 2023-10-16 | End: 2023-10-19 | Stop reason: HOSPADM

## 2023-10-16 RX ORDER — SODIUM CHLORIDE, SODIUM LACTATE, POTASSIUM CHLORIDE, CALCIUM CHLORIDE 600; 310; 30; 20 MG/100ML; MG/100ML; MG/100ML; MG/100ML
100 INJECTION, SOLUTION INTRAVENOUS CONTINUOUS
Status: DISCONTINUED | OUTPATIENT
Start: 2023-10-16 | End: 2023-10-16

## 2023-10-16 RX ORDER — LABETALOL HYDROCHLORIDE 5 MG/ML
5 INJECTION, SOLUTION INTRAVENOUS ONCE AS NEEDED
Status: DISCONTINUED | OUTPATIENT
Start: 2023-10-16 | End: 2023-10-16 | Stop reason: HOSPADM

## 2023-10-16 RX ORDER — DEXAMETHASONE SODIUM PHOSPHATE 4 MG/ML
INJECTION, SOLUTION INTRA-ARTICULAR; INTRALESIONAL; INTRAMUSCULAR; INTRAVENOUS; SOFT TISSUE AS NEEDED
Status: DISCONTINUED | OUTPATIENT
Start: 2023-10-16 | End: 2023-10-16

## 2023-10-16 RX ORDER — LISINOPRIL 5 MG/1
2.5 TABLET ORAL DAILY
Status: DISCONTINUED | OUTPATIENT
Start: 2023-10-16 | End: 2023-10-19 | Stop reason: HOSPADM

## 2023-10-16 RX ORDER — ONDANSETRON HYDROCHLORIDE 2 MG/ML
4 INJECTION, SOLUTION INTRAVENOUS EVERY 8 HOURS PRN
Status: DISCONTINUED | OUTPATIENT
Start: 2023-10-16 | End: 2023-10-19 | Stop reason: HOSPADM

## 2023-10-16 RX ORDER — DEXTROSE MONOHYDRATE 100 MG/ML
0.3 INJECTION, SOLUTION INTRAVENOUS ONCE AS NEEDED
Status: DISCONTINUED | OUTPATIENT
Start: 2023-10-16 | End: 2023-10-19 | Stop reason: HOSPADM

## 2023-10-16 RX ORDER — SODIUM CHLORIDE, SODIUM LACTATE, POTASSIUM CHLORIDE, CALCIUM CHLORIDE 600; 310; 30; 20 MG/100ML; MG/100ML; MG/100ML; MG/100ML
50 INJECTION, SOLUTION INTRAVENOUS CONTINUOUS
Status: DISCONTINUED | OUTPATIENT
Start: 2023-10-16 | End: 2023-10-17

## 2023-10-16 RX ORDER — LEVOTHYROXINE SODIUM 112 UG/1
112 TABLET ORAL DAILY
Status: DISCONTINUED | OUTPATIENT
Start: 2023-10-16 | End: 2023-10-19 | Stop reason: HOSPADM

## 2023-10-16 RX ORDER — DEXTROSE 50 % IN WATER (D50W) INTRAVENOUS SYRINGE
25
Status: DISCONTINUED | OUTPATIENT
Start: 2023-10-16 | End: 2023-10-19 | Stop reason: HOSPADM

## 2023-10-16 RX ORDER — ONDANSETRON 4 MG/1
4 TABLET, ORALLY DISINTEGRATING ORAL EVERY 8 HOURS PRN
Status: DISCONTINUED | OUTPATIENT
Start: 2023-10-16 | End: 2023-10-19 | Stop reason: HOSPADM

## 2023-10-16 RX ORDER — ROCURONIUM BROMIDE 10 MG/ML
INJECTION, SOLUTION INTRAVENOUS AS NEEDED
Status: DISCONTINUED | OUTPATIENT
Start: 2023-10-16 | End: 2023-10-16

## 2023-10-16 RX ORDER — CARVEDILOL 3.12 MG/1
3.12 TABLET ORAL
Status: DISCONTINUED | OUTPATIENT
Start: 2023-10-16 | End: 2023-10-19 | Stop reason: HOSPADM

## 2023-10-16 RX ORDER — CEFAZOLIN SODIUM 2 G/100ML
2 INJECTION, SOLUTION INTRAVENOUS ONCE
Status: COMPLETED | OUTPATIENT
Start: 2023-10-16 | End: 2023-10-16

## 2023-10-16 RX ORDER — ATORVASTATIN CALCIUM 10 MG/1
10 TABLET, FILM COATED ORAL EVERY EVENING
Status: DISCONTINUED | OUTPATIENT
Start: 2023-10-16 | End: 2023-10-19 | Stop reason: HOSPADM

## 2023-10-16 RX ORDER — SODIUM CHLORIDE, SODIUM LACTATE, POTASSIUM CHLORIDE, CALCIUM CHLORIDE 600; 310; 30; 20 MG/100ML; MG/100ML; MG/100ML; MG/100ML
100 INJECTION, SOLUTION INTRAVENOUS CONTINUOUS
Status: DISCONTINUED | OUTPATIENT
Start: 2023-10-16 | End: 2023-10-16 | Stop reason: HOSPADM

## 2023-10-16 RX ORDER — HYDROMORPHONE HYDROCHLORIDE 1 MG/ML
0.5 INJECTION, SOLUTION INTRAMUSCULAR; INTRAVENOUS; SUBCUTANEOUS EVERY 5 MIN PRN
Status: DISCONTINUED | OUTPATIENT
Start: 2023-10-16 | End: 2023-10-16 | Stop reason: HOSPADM

## 2023-10-16 RX ORDER — PHENYLEPHRINE HCL IN 0.9% NACL 1 MG/10 ML
SYRINGE (ML) INTRAVENOUS AS NEEDED
Status: DISCONTINUED | OUTPATIENT
Start: 2023-10-16 | End: 2023-10-16

## 2023-10-16 RX ORDER — METRONIDAZOLE 500 MG/100ML
500 INJECTION, SOLUTION INTRAVENOUS ONCE
Status: COMPLETED | OUTPATIENT
Start: 2023-10-16 | End: 2023-10-16

## 2023-10-16 RX ORDER — ONDANSETRON HYDROCHLORIDE 2 MG/ML
INJECTION, SOLUTION INTRAVENOUS AS NEEDED
Status: DISCONTINUED | OUTPATIENT
Start: 2023-10-16 | End: 2023-10-16

## 2023-10-16 RX ORDER — PROPOFOL 10 MG/ML
INJECTION, EMULSION INTRAVENOUS AS NEEDED
Status: DISCONTINUED | OUTPATIENT
Start: 2023-10-16 | End: 2023-10-16

## 2023-10-16 RX ORDER — LIDOCAINE HYDROCHLORIDE 10 MG/ML
0.1 INJECTION, SOLUTION EPIDURAL; INFILTRATION; INTRACAUDAL; PERINEURAL ONCE
Status: DISCONTINUED | OUTPATIENT
Start: 2023-10-16 | End: 2023-10-16 | Stop reason: HOSPADM

## 2023-10-16 RX ORDER — INSULIN LISPRO 100 [IU]/ML
0-5 INJECTION, SOLUTION INTRAVENOUS; SUBCUTANEOUS
Status: DISCONTINUED | OUTPATIENT
Start: 2023-10-16 | End: 2023-10-19 | Stop reason: HOSPADM

## 2023-10-16 RX ORDER — BUPIVACAINE HYDROCHLORIDE 5 MG/ML
INJECTION, SOLUTION EPIDURAL; INTRACAUDAL AS NEEDED
Status: DISCONTINUED | OUTPATIENT
Start: 2023-10-16 | End: 2023-10-16 | Stop reason: HOSPADM

## 2023-10-16 RX ORDER — HYDRALAZINE HYDROCHLORIDE 20 MG/ML
5 INJECTION INTRAMUSCULAR; INTRAVENOUS EVERY 30 MIN PRN
Status: DISCONTINUED | OUTPATIENT
Start: 2023-10-16 | End: 2023-10-16 | Stop reason: HOSPADM

## 2023-10-16 RX ORDER — FENTANYL CITRATE 50 UG/ML
50 INJECTION, SOLUTION INTRAMUSCULAR; INTRAVENOUS EVERY 5 MIN PRN
Status: DISCONTINUED | OUTPATIENT
Start: 2023-10-16 | End: 2023-10-16 | Stop reason: HOSPADM

## 2023-10-16 RX ORDER — ONDANSETRON HYDROCHLORIDE 2 MG/ML
4 INJECTION, SOLUTION INTRAVENOUS ONCE AS NEEDED
Status: DISCONTINUED | OUTPATIENT
Start: 2023-10-16 | End: 2023-10-16 | Stop reason: HOSPADM

## 2023-10-16 RX ORDER — OXYCODONE HYDROCHLORIDE 10 MG/1
10 TABLET ORAL EVERY 4 HOURS PRN
Status: DISCONTINUED | OUTPATIENT
Start: 2023-10-16 | End: 2023-10-19 | Stop reason: HOSPADM

## 2023-10-16 RX ORDER — CEFAZOLIN SODIUM 2 G/100ML
2 INJECTION, SOLUTION INTRAVENOUS EVERY 8 HOURS
Status: DISCONTINUED | OUTPATIENT
Start: 2023-10-16 | End: 2023-10-16

## 2023-10-16 RX ORDER — LIDOCAINE HYDROCHLORIDE 20 MG/ML
INJECTION, SOLUTION INFILTRATION; PERINEURAL AS NEEDED
Status: DISCONTINUED | OUTPATIENT
Start: 2023-10-16 | End: 2023-10-16

## 2023-10-16 RX ORDER — ALBUTEROL SULFATE 0.83 MG/ML
2.5 SOLUTION RESPIRATORY (INHALATION) ONCE AS NEEDED
Status: DISCONTINUED | OUTPATIENT
Start: 2023-10-16 | End: 2023-10-16 | Stop reason: HOSPADM

## 2023-10-16 RX ORDER — CEFAZOLIN SODIUM 2 G/100ML
2 INJECTION, SOLUTION INTRAVENOUS EVERY 8 HOURS
Status: COMPLETED | OUTPATIENT
Start: 2023-10-16 | End: 2023-10-17

## 2023-10-16 RX ORDER — HEPARIN SODIUM 5000 [USP'U]/ML
5000 INJECTION, SOLUTION INTRAVENOUS; SUBCUTANEOUS EVERY 8 HOURS SCHEDULED
Status: DISCONTINUED | OUTPATIENT
Start: 2023-10-16 | End: 2023-10-19 | Stop reason: HOSPADM

## 2023-10-16 RX ORDER — MECLIZINE HCL 12.5 MG 12.5 MG/1
12.5 TABLET ORAL EVERY 12 HOURS PRN
Status: DISCONTINUED | OUTPATIENT
Start: 2023-10-16 | End: 2023-10-19 | Stop reason: HOSPADM

## 2023-10-16 RX ORDER — FENTANYL CITRATE 50 UG/ML
INJECTION, SOLUTION INTRAMUSCULAR; INTRAVENOUS AS NEEDED
Status: DISCONTINUED | OUTPATIENT
Start: 2023-10-16 | End: 2023-10-16

## 2023-10-16 RX ADMIN — ONDANSETRON 4 MG: 2 INJECTION INTRAMUSCULAR; INTRAVENOUS at 11:56

## 2023-10-16 RX ADMIN — SODIUM CHLORIDE, POTASSIUM CHLORIDE, SODIUM LACTATE AND CALCIUM CHLORIDE 50 ML/HR: 600; 310; 30; 20 INJECTION, SOLUTION INTRAVENOUS at 15:15

## 2023-10-16 RX ADMIN — CEFAZOLIN SODIUM 2 G: 2 INJECTION, SOLUTION INTRAVENOUS at 09:49

## 2023-10-16 RX ADMIN — PROPOFOL 30 MG: 10 INJECTION, EMULSION INTRAVENOUS at 11:42

## 2023-10-16 RX ADMIN — PROPOFOL 50 MG: 10 INJECTION, EMULSION INTRAVENOUS at 10:49

## 2023-10-16 RX ADMIN — SUGAMMADEX 200 MG: 100 INJECTION, SOLUTION INTRAVENOUS at 12:05

## 2023-10-16 RX ADMIN — FENTANYL CITRATE 100 MCG: 50 INJECTION, SOLUTION INTRAMUSCULAR; INTRAVENOUS at 10:49

## 2023-10-16 RX ADMIN — Medication 200 MCG: at 09:56

## 2023-10-16 RX ADMIN — Medication 50 MG: at 09:42

## 2023-10-16 RX ADMIN — FENTANYL CITRATE 50 MCG: 50 INJECTION, SOLUTION INTRAMUSCULAR; INTRAVENOUS at 09:42

## 2023-10-16 RX ADMIN — FENTANYL CITRATE 50 MCG: 50 INJECTION, SOLUTION INTRAMUSCULAR; INTRAVENOUS at 10:12

## 2023-10-16 RX ADMIN — HYDROMORPHONE HYDROCHLORIDE 0.5 MG: 1 INJECTION, SOLUTION INTRAMUSCULAR; INTRAVENOUS; SUBCUTANEOUS at 13:37

## 2023-10-16 RX ADMIN — PROPOFOL 20 MG: 10 INJECTION, EMULSION INTRAVENOUS at 10:10

## 2023-10-16 RX ADMIN — DEXAMETHASONE SODIUM PHOSPHATE 4 MG: 4 INJECTION, SOLUTION INTRAMUSCULAR; INTRAVENOUS at 10:23

## 2023-10-16 RX ADMIN — ATORVASTATIN CALCIUM 10 MG: 10 TABLET, FILM COATED ORAL at 20:50

## 2023-10-16 RX ADMIN — METRONIDAZOLE 500 MG: 500 INJECTION, SOLUTION INTRAVENOUS at 09:40

## 2023-10-16 RX ADMIN — SODIUM CHLORIDE, POTASSIUM CHLORIDE, SODIUM LACTATE AND CALCIUM CHLORIDE: 600; 310; 30; 20 INJECTION, SOLUTION INTRAVENOUS at 09:39

## 2023-10-16 RX ADMIN — LIDOCAINE HYDROCHLORIDE 60 MG: 20 INJECTION, SOLUTION INFILTRATION; PERINEURAL at 09:42

## 2023-10-16 RX ADMIN — HEPARIN SODIUM 5000 UNITS: 5000 INJECTION INTRAVENOUS; SUBCUTANEOUS at 22:00

## 2023-10-16 RX ADMIN — Medication 15 MG: at 10:35

## 2023-10-16 RX ADMIN — SODIUM CHLORIDE, POTASSIUM CHLORIDE, SODIUM LACTATE AND CALCIUM CHLORIDE 100 ML/HR: 600; 310; 30; 20 INJECTION, SOLUTION INTRAVENOUS at 12:30

## 2023-10-16 RX ADMIN — CEFAZOLIN SODIUM 2 G: 2 INJECTION, SOLUTION INTRAVENOUS at 17:41

## 2023-10-16 RX ADMIN — HEPARIN SODIUM 5000 UNITS: 5000 INJECTION INTRAVENOUS; SUBCUTANEOUS at 17:42

## 2023-10-16 RX ADMIN — Medication 100 MCG: at 10:55

## 2023-10-16 RX ADMIN — PROPOFOL 100 MG: 10 INJECTION, EMULSION INTRAVENOUS at 09:42

## 2023-10-16 SDOH — SOCIAL STABILITY: SOCIAL INSECURITY: ARE THERE ANY APPARENT SIGNS OF INJURIES/BEHAVIORS THAT COULD BE RELATED TO ABUSE/NEGLECT?: NO

## 2023-10-16 SDOH — SOCIAL STABILITY: SOCIAL INSECURITY: HAS ANYONE EVER THREATENED TO HURT YOUR FAMILY OR YOUR PETS?: NO

## 2023-10-16 SDOH — SOCIAL STABILITY: SOCIAL INSECURITY: DO YOU FEEL UNSAFE GOING BACK TO THE PLACE WHERE YOU ARE LIVING?: NO

## 2023-10-16 SDOH — SOCIAL STABILITY: SOCIAL INSECURITY: ARE YOU OR HAVE YOU BEEN THREATENED OR ABUSED PHYSICALLY, EMOTIONALLY, OR SEXUALLY BY ANYONE?: NO

## 2023-10-16 SDOH — SOCIAL STABILITY: SOCIAL INSECURITY: DO YOU FEEL ANYONE HAS EXPLOITED OR TAKEN ADVANTAGE OF YOU FINANCIALLY OR OF YOUR PERSONAL PROPERTY?: NO

## 2023-10-16 SDOH — SOCIAL STABILITY: SOCIAL INSECURITY: WERE YOU ABLE TO COMPLETE ALL THE BEHAVIORAL HEALTH SCREENINGS?: YES

## 2023-10-16 SDOH — SOCIAL STABILITY: SOCIAL INSECURITY: DOES ANYONE TRY TO KEEP YOU FROM HAVING/CONTACTING OTHER FRIENDS OR DOING THINGS OUTSIDE YOUR HOME?: NO

## 2023-10-16 SDOH — SOCIAL STABILITY: SOCIAL INSECURITY: HAVE YOU HAD THOUGHTS OF HARMING ANYONE ELSE?: NO

## 2023-10-16 SDOH — SOCIAL STABILITY: SOCIAL INSECURITY: ABUSE: ADULT

## 2023-10-16 ASSESSMENT — PATIENT HEALTH QUESTIONNAIRE - PHQ9
SUM OF ALL RESPONSES TO PHQ9 QUESTIONS 1 & 2: 0
1. LITTLE INTEREST OR PLEASURE IN DOING THINGS: NOT AT ALL
2. FEELING DOWN, DEPRESSED OR HOPELESS: NOT AT ALL

## 2023-10-16 ASSESSMENT — COLUMBIA-SUICIDE SEVERITY RATING SCALE - C-SSRS
2. HAVE YOU ACTUALLY HAD ANY THOUGHTS OF KILLING YOURSELF?: NO
6. HAVE YOU EVER DONE ANYTHING, STARTED TO DO ANYTHING, OR PREPARED TO DO ANYTHING TO END YOUR LIFE?: NO
1. IN THE PAST MONTH, HAVE YOU WISHED YOU WERE DEAD OR WISHED YOU COULD GO TO SLEEP AND NOT WAKE UP?: NO

## 2023-10-16 ASSESSMENT — PAIN SCALES - GENERAL
PAINLEVEL_OUTOF10: 2
PAINLEVEL_OUTOF10: 7
PAINLEVEL_OUTOF10: 2
PAINLEVEL_OUTOF10: 0 - NO PAIN
PAINLEVEL_OUTOF10: 2
PAINLEVEL_OUTOF10: 4

## 2023-10-16 ASSESSMENT — PAIN - FUNCTIONAL ASSESSMENT
PAIN_FUNCTIONAL_ASSESSMENT: 0-10

## 2023-10-16 ASSESSMENT — ACTIVITIES OF DAILY LIVING (ADL)
ADEQUATE_TO_COMPLETE_ADL: YES
FEEDING YOURSELF: INDEPENDENT
HEARING - RIGHT EAR: DIFFICULTY WITH NOISE
HEARING - LEFT EAR: DIFFICULTY WITH NOISE
GROOMING: NEEDS ASSISTANCE
WALKS IN HOME: INDEPENDENT
LACK_OF_TRANSPORTATION: NO
ASSISTIVE_DEVICE: WALKER
TOILETING: INDEPENDENT
PATIENT'S MEMORY ADEQUATE TO SAFELY COMPLETE DAILY ACTIVITIES?: YES
BATHING: NEEDS ASSISTANCE
JUDGMENT_ADEQUATE_SAFELY_COMPLETE_DAILY_ACTIVITIES: YES
DRESSING YOURSELF: INDEPENDENT

## 2023-10-16 ASSESSMENT — COGNITIVE AND FUNCTIONAL STATUS - GENERAL
MOVING TO AND FROM BED TO CHAIR: A LITTLE
TURNING FROM BACK TO SIDE WHILE IN FLAT BAD: A LITTLE
WALKING IN HOSPITAL ROOM: A LITTLE
MOBILITY SCORE: 19
TOILETING: A LITTLE
CLIMB 3 TO 5 STEPS WITH RAILING: A LITTLE
PATIENT BASELINE BEDBOUND: NO
HELP NEEDED FOR BATHING: A LITTLE
DAILY ACTIVITIY SCORE: 20
DRESSING REGULAR UPPER BODY CLOTHING: A LITTLE
STANDING UP FROM CHAIR USING ARMS: A LITTLE
DRESSING REGULAR LOWER BODY CLOTHING: A LITTLE

## 2023-10-16 ASSESSMENT — LIFESTYLE VARIABLES
HOW MANY STANDARD DRINKS CONTAINING ALCOHOL DO YOU HAVE ON A TYPICAL DAY: PATIENT DOES NOT DRINK
AUDIT-C TOTAL SCORE: 0
SKIP TO QUESTIONS 9-10: 1
AUDIT-C TOTAL SCORE: 0
HOW OFTEN DO YOU HAVE 6 OR MORE DRINKS ON ONE OCCASION: NEVER
HOW OFTEN DO YOU HAVE A DRINK CONTAINING ALCOHOL: NEVER

## 2023-10-16 NOTE — PROGRESS NOTES
Met with patient and cousin Liz Gallegos at bedside. Pt is very [place] and Liz provided information. Pt admitted for lap-colectomy. Pt lives with her nephew and was independent PTA with no HHC. PT/OT evals pending. Pt would like to return home on discharge. Liz states someone will be with patient at all time. Also discussed SNF placement for rehab if needed. TCC team with monitor progress and discharge needs.

## 2023-10-16 NOTE — ANESTHESIA PREPROCEDURE EVALUATION
Patient: Lupis Jackson    Procedure Information       Date/Time: 10/16/23 0830    Procedure: Resection Laparoscopy Large Intestine    Location: [facility] OR 07 / [facility]    Surgeons: Chirag Cloud MD            Relevant Problems   Cardiovascular   (+) CAD (coronary artery disease)   (+) Essential hypertension   (+) Essential hypertriglyceridemia   (+) Hyperlipidemia      Endocrine   (+) Adult hypothyroidism      GI   (+) Malignant neoplasm of ascending colon (CMS/HCC)      Neuro/Psych   (+) Generalized anxiety disorder      GI/Hepatic   (+) Malignant neoplasm of ascending colon (CMS/HCC)      Musculoskeletal   (+) Chronic low back pain   (+) Scoliosis of thoracolumbar spine      Eyes, Ears, Nose, and Throat   (+) Hearing loss      Infectious Disease   (+) Fungal nail infection      ENT   (+) BPPV (benign paroxysmal positional vertigo)       Clinical information reviewed:   Tobacco  Allergies  Meds   Med Hx  Surg Hx  OB Status  Fam Hx  Soc   Hx        NPO Detail:  NPO/Void Status  Date of Last Liquid: 10/16/23  Time of Last Liquid: 0700  Date of Last Solid: 10/14/23  Last Intake Type: Clear fluids         Physical Exam    Airway  Mallampati: II  TM distance: >3 FB  Neck ROM: full     Cardiovascular   Rhythm: regular  Rate: normal     Dental    Pulmonary   Breath sounds clear to auscultation     Abdominal   Abdomen: soft             Anesthesia Plan    ASA 3     general     intravenous induction   Postoperative administration of opioids is intended.  Anesthetic plan and risks discussed with patient.  Use of blood products discussed with patient who.    Plan discussed with [facility].

## 2023-10-16 NOTE — ANESTHESIA POSTPROCEDURE EVALUATION
Patient: Lupis Jackson    Procedure Summary       Date: 10/16/23 Room / Location: [facility] OR 07 / [facility] OR    Anesthesia Start: 0925 Anesthesia Stop: 1224    Procedure: Resection Laparoscopy Large Intestine (Abdomen) Diagnosis:       Malignant neoplasm of ascending colon (CMS/HCC)      (Malignant neoplasm of ascending colon (CMS/HCC) [C18.2])    Surgeons: Chirag Cloud MD Responsible Provider: Melvina Liz MD    Anesthesia Type: general ASA Status: 3            Anesthesia Type: general    Vitals Value Taken Time   /63 10/16/23 1223   Temp 36.3 °C (97.3 °F) 10/16/23 1223   Pulse 70 10/16/23 1223   Resp 16 10/16/23 1223   SpO2 100 % 10/16/23 1223       Anesthesia Post Evaluation    Patient location during evaluation: PACU  Patient participation: complete - patient cannot participate  Level of consciousness: awake  Pain management: adequate  Airway patency: patent  Cardiovascular status: acceptable  Respiratory status: acceptable and face mask  Hydration status: acceptable    No notable events documented.

## 2023-10-16 NOTE — H&P
H&P reviewed. No changes to previous since H&P note (see below 10/10 office visit)    FRANK Baugh is a 89 yo female who underwent a colonoscopy with Dr. Whittington on 6/20/23. She was found to have a likely malignant tumor in the proximal ascending colon. Path demonstrating invasive poorly differentiated adenocarcinoma. PMS-2 and MLH-1 absent. Colonoscopy was completed for diarrhea.      She presents today to discuss surgery that is scheduled for next week. Unfortunately her son in law passed away.      CEA 8/23/23: 2.3     CT chest 8/31/23: Colon carcinoma restaging. No metastatic disease to the lungs. Prominent right hilar lymph node.     CT a/p 7/30/23: Mild wall thickening of the distal descending and sigmoid colon, concerning for mild colitis. There is slight urothelial thickening/enhancement which is nonspecific, though may be seen with urinary tract infection/inflammation. Please correlate with urinalysis. Colonic diverticulosis without evidence of diverticulitis. Mild intrahepatic and extrahepatic biliary ductal dilation unchanged from 11/02/2022, likely secondary to prior cholecystectomy. 0.8 cm enhancing lymph node adjacent to the sigmoid colon not substantially changed from 11/02/2022, uncertain clinical significance.     Colonoscopy 6/20/23 (P[facility]): Likely malignant tumor in the proximal ascending colon. Biopsied. 5cms distal to the cecum. Diverticulosis in the sigmoid colon. External hemorrhoids. The sigmoid colon, descending colon and transverse colon are normal. Biopsied. The examination was otherwise normal on direct and retroflexion views. Path demonstrating invasive poorly differentiated adenocarcinoma. Positive for MLH1 promoter methylation.      Review of Systems   Constitutional:  Negative for activity change, appetite change, chills, fatigue, fever and unexpected weight change.   Respiratory:  Negative for cough, choking, chest tightness, shortness of breath and wheezing.    Cardiovascular:   Negative for chest pain, palpitations and leg swelling.   Gastrointestinal:  Negative for abdominal distention, abdominal pain, anal bleeding, blood in stool, constipation, diarrhea, nausea, rectal pain and vomiting.   Genitourinary:  Negative for difficulty urinating, dysuria, frequency and hematuria.   Neurological:  Negative for dizziness, weakness and light-headedness.   Psychiatric/Behavioral:  Negative for agitation.          Objective   Physical Exam  Constitutional:       General: She is not in acute distress.     Appearance: Normal appearance. She is not ill-appearing.   HENT:      Head: Normocephalic.      Mouth/Throat:      Mouth: Mucous membranes are moist.   Eyes:      Extraocular Movements: Extraocular movements intact.      Pupils: Pupils are equal, round, and reactive to light.   Cardiovascular:      Rate and Rhythm: Normal rate and regular rhythm.      Pulses: Normal pulses.      Heart sounds: Normal heart sounds. No murmur heard.  Pulmonary:      Effort: No respiratory distress.      Breath sounds: No wheezing, rhonchi or rales.   Chest:      Chest wall: No tenderness.   Abdominal:      General: There is no distension.      Palpations: There is no mass.      Tenderness: There is no abdominal tenderness. There is no guarding or rebound.      Hernia: No hernia is present.   Genitourinary:     Rectum: Normal.   Musculoskeletal:         General: No swelling or deformity.      Cervical back: No rigidity.      Right lower leg: No edema.      Left lower leg: No edema.   Skin:     General: Skin is warm.      Coloration: Skin is not jaundiced or pale.   Neurological:      Mental Status: She is alert.        Assessment/Plan   #Ascending colon adenocarcinoma, invasive poorly differentiated, MLH-1 and PMS-2 deficient     -  For laparoscopic possible open right danette-colectomy  -  R/B/A discussed with patient again  -  Expected perioperative course and plan discussed with patient  -  Patient would like to  proceed  -  Scheduled for 10/16/2023  -  Bowel prep day before surgery

## 2023-10-16 NOTE — ANESTHESIA PROCEDURE NOTES
Peripheral IV  Date/Time: 10/16/2023 10:02 AM  Inserted by: Melvina Liz MD    Placement  Needle size: 18 G  Laterality: right  Location: hand  Local anesthetic: none  Site prep: alcohol  Technique: anatomical landmarks  Attempts: 1

## 2023-10-16 NOTE — ANESTHESIA PROCEDURE NOTES
Arterial Line:    Date/Time: 10/16/2023 9:51 AM    Staffing  Performed: SEAN   Authorized by: Melvina Liz MD    Performed by: BOYD Capellan    An arterial line was placed. Procedure performed using surface landmarks.in the OR for the following indication(s): continuous blood pressure monitoring.    A 20 gauge (size), 1 and 3/4 inch (length), Angiocath (type) catheter was placed into the Left radial artery, secured by Tegaderm,   Seldinger technique not used.  Events:  patient tolerated procedure well with no complications.

## 2023-10-16 NOTE — ANESTHESIA PROCEDURE NOTES
Airway  Date/Time: 10/16/2023 9:45 AM  Urgency: elective    Airway not difficult    Staffing  Performed: SEAN   Authorized by: Melvina Liz MD    Performed by: BOYD Capellan  Patient location during procedure: OR    Indications and Patient Condition  Indications for airway management: anesthesia  Spontaneous Ventilation: absent  Sedation level: deep  Preoxygenated: yes  Patient position: sniffing  MILS not maintained throughout  Mask difficulty assessment: 1 - vent by mask    Final Airway Details  Final airway type: endotracheal airway      Successful airway: ETT  Cuffed: yes   Successful intubation technique: direct laryngoscopy  Endotracheal tube insertion site: oral  Blade: Ev  Blade size: #3  ETT size (mm): 7.0  Cormack-Lehane Classification: grade I - full view of glottis  Placement verified by: chest auscultation and capnometry   Cuff volume (mL): 6  Measured from: lips  ETT to lips (cm): 21  Number of attempts at approach: 1

## 2023-10-16 NOTE — BRIEF OP NOTE
Date: 10/16/2023  OR Location: [facility] OR    Name: Lupis Jackson, : 3/14/1926, Age: 89 y.o., MRN: [id number], Sex: female    Diagnosis  Pre-op Diagnosis     * Malignant neoplasm of ascending colon (CMS/HCC) [C18.2] Post-op Diagnosis     * Malignant neoplasm of ascending colon (CMS/HCC) [C18.2]     Procedures  Resection Laparoscopy Large Intestine  32544 - SD LAPS COLECTOMY TOT W/O PRCTECT W/ILEOST/ILEOPXTS  Laparoscopic right danette-colectomy with stapled side-to-side ileocolic anastomosis    Surgeons      * Chirag Cloud - Primary    Resident/Fellow/Other Assistant:  No surgical staff documented.    Procedure Summary  Anesthesia: General  ASA: III  Anesthesia Staff: Anesthesiologist: Melvina Liz MD  C-AA: BOYD Capellan  Estimated Blood Loss: 15mL  Intra-op Medications:   Medication Name Total Dose   lactated Ringer's infusion Cannot be calculated   ceFAZolin in dextrose (iso-os) (Ancef) IVPB 2 g 2 g   metroNIDAZOLE in NaCl (iso-os) (Flagyl)  mg 500 mg              Anesthesia Record               Intraprocedure I/O Totals          Intake    Phenylephrine Drip 0.00 mL    The total shown is the total volume documented since Anesthesia Start was filed.    Total Intake 0 mL          Specimen:   ID Type Source Tests Collected by Time   1 : RIGHT COLON Tissue COLON - RIGHT HEMICOLECTOMY SURGICAL PATHOLOGY EXAM Chirag Cloud MD 10/16/2023 1958        Staff:   Circulator: GIAN Correia-CNP  Scrub Person: Magdy Ballard          Findings: Right colon mass    Complications:  None; patient tolerated the procedure well.     Disposition: PACU - hemodynamically stable.  Condition: stable  Specimens Collected:   ID Type Source Tests Collected by Time   1 : RIGHT COLON Tissue COLON - RIGHT HEMICOLECTOMY SURGICAL PATHOLOGY EXAM Chirag Cloud MD 10/16/2023 8055     Attending Attestation: I was present and scrubbed for the entire procedure.    Chirag Cloud  Phone Number: [phone]

## 2023-10-17 LAB
ANION GAP SERPL CALC-SCNC: 11 MMOL/L (ref 10–20)
BUN SERPL-MCNC: 10 MG/DL (ref 6–23)
CALCIUM SERPL-MCNC: 8.8 MG/DL (ref 8.6–10.3)
CHLORIDE SERPL-SCNC: 100 MMOL/L (ref 98–107)
CO2 SERPL-SCNC: 26 MMOL/L (ref 21–32)
CREAT SERPL-MCNC: 0.67 MG/DL (ref 0.5–1.05)
ERYTHROCYTE [DISTWIDTH] IN BLOOD BY AUTOMATED COUNT: 13.2 % (ref 11.5–14.5)
GFR SERPL CREATININE-BSD FRML MDRD: 80 ML/MIN/1.73M*2
GLUCOSE SERPL-MCNC: 115 MG/DL (ref 74–99)
HCT VFR BLD AUTO: 31.6 % (ref 36–46)
HGB BLD-MCNC: 9.8 G/DL (ref 12–16)
MCH RBC QN AUTO: 30 PG (ref 26–34)
MCHC RBC AUTO-ENTMCNC: 31 G/DL (ref 32–36)
MCV RBC AUTO: 97 FL (ref 80–100)
NRBC BLD-RTO: 0 /100 WBCS (ref 0–0)
PLATELET # BLD AUTO: 338 X10*3/UL (ref 150–450)
PMV BLD AUTO: 10.8 FL (ref 7.5–11.5)
POTASSIUM SERPL-SCNC: 3.7 MMOL/L (ref 3.5–5.3)
RBC # BLD AUTO: 3.27 X10*6/UL (ref 4–5.2)
SODIUM SERPL-SCNC: 133 MMOL/L (ref 136–145)
WBC # BLD AUTO: 8.8 X10*3/UL (ref 4.4–11.3)

## 2023-10-17 PROCEDURE — 2500000002 HC RX 250 W HCPCS SELF ADMINISTERED DRUGS (ALT 637 FOR MEDICARE OP, ALT 636 FOR OP/ED): Performed by: STUDENT IN AN ORGANIZED HEALTH CARE EDUCATION/TRAINING PROGRAM

## 2023-10-17 PROCEDURE — 96372 THER/PROPH/DIAG INJ SC/IM: CPT | Performed by: STUDENT IN AN ORGANIZED HEALTH CARE EDUCATION/TRAINING PROGRAM

## 2023-10-17 PROCEDURE — 2500000001 HC RX 250 WO HCPCS SELF ADMINISTERED DRUGS (ALT 637 FOR MEDICARE OP): Performed by: STUDENT IN AN ORGANIZED HEALTH CARE EDUCATION/TRAINING PROGRAM

## 2023-10-17 PROCEDURE — 2500000004 HC RX 250 GENERAL PHARMACY W/ HCPCS (ALT 636 FOR OP/ED): Performed by: STUDENT IN AN ORGANIZED HEALTH CARE EDUCATION/TRAINING PROGRAM

## 2023-10-17 PROCEDURE — 99231 SBSQ HOSP IP/OBS SF/LOW 25: CPT | Performed by: NURSE PRACTITIONER

## 2023-10-17 PROCEDURE — 80048 BASIC METABOLIC PNL TOTAL CA: CPT | Performed by: STUDENT IN AN ORGANIZED HEALTH CARE EDUCATION/TRAINING PROGRAM

## 2023-10-17 PROCEDURE — 1200000002 HC GENERAL ROOM WITH TELEMETRY DAILY

## 2023-10-17 PROCEDURE — 36415 COLL VENOUS BLD VENIPUNCTURE: CPT | Performed by: STUDENT IN AN ORGANIZED HEALTH CARE EDUCATION/TRAINING PROGRAM

## 2023-10-17 PROCEDURE — 85027 COMPLETE CBC AUTOMATED: CPT | Performed by: STUDENT IN AN ORGANIZED HEALTH CARE EDUCATION/TRAINING PROGRAM

## 2023-10-17 RX ADMIN — OXYCODONE HYDROCHLORIDE 10 MG: 10 TABLET ORAL at 20:47

## 2023-10-17 RX ADMIN — ATORVASTATIN CALCIUM 10 MG: 10 TABLET, FILM COATED ORAL at 20:47

## 2023-10-17 RX ADMIN — PAROXETINE 30 MG: 20 TABLET, FILM COATED ORAL at 09:58

## 2023-10-17 RX ADMIN — CEFAZOLIN SODIUM 2 G: 2 INJECTION, SOLUTION INTRAVENOUS at 00:00

## 2023-10-17 RX ADMIN — LISINOPRIL 2.5 MG: 5 TABLET ORAL at 09:58

## 2023-10-17 RX ADMIN — ONDANSETRON 4 MG: 2 INJECTION INTRAMUSCULAR; INTRAVENOUS at 20:53

## 2023-10-17 RX ADMIN — CARVEDILOL 3.12 MG: 3.12 TABLET, FILM COATED ORAL at 09:59

## 2023-10-17 RX ADMIN — LEVOTHYROXINE SODIUM 112 MCG: 0.11 TABLET ORAL at 09:58

## 2023-10-17 RX ADMIN — INSULIN LISPRO 3 UNITS: 100 INJECTION, SOLUTION INTRAVENOUS; SUBCUTANEOUS at 17:28

## 2023-10-17 RX ADMIN — HEPARIN SODIUM 5000 UNITS: 5000 INJECTION INTRAVENOUS; SUBCUTANEOUS at 14:35

## 2023-10-17 RX ADMIN — HEPARIN SODIUM 5000 UNITS: 5000 INJECTION INTRAVENOUS; SUBCUTANEOUS at 22:00

## 2023-10-17 RX ADMIN — MECLIZINE 12.5 MG: 12.5 TABLET ORAL at 14:35

## 2023-10-17 RX ADMIN — HEPARIN SODIUM 5000 UNITS: 5000 INJECTION INTRAVENOUS; SUBCUTANEOUS at 06:29

## 2023-10-17 ASSESSMENT — PAIN SCALES - GENERAL
PAINLEVEL_OUTOF10: 0 - NO PAIN

## 2023-10-17 ASSESSMENT — PAIN - FUNCTIONAL ASSESSMENT
PAIN_FUNCTIONAL_ASSESSMENT: 0-10

## 2023-10-17 NOTE — CARE PLAN
The clinical goals for the shift include pain management    This pt denied any complaints of pain this shift. This patient had adequate intake and output and is tolerating her diet well with no complaints of N&V. Gibson was removed this morning and this pt is voiding fine. This pt spent time out of bed and sat in her chair this shift. Family is at bedside. PRN meclizine given since this pt had complaints of dizziness. VSS. Safety maintained with nonskid footwear and use of call light. This pt has no questions or concerns.

## 2023-10-17 NOTE — CARE PLAN
Problem: Diabetes  Goal: Achieve decreasing blood glucose levels by end of shift  10/17/2023 0220 by Ginny Torrez RN  Outcome: Progressing  10/17/2023 0220 by Ginny Torrez RN  Outcome: Progressing  Goal: Increase stability of blood glucose readings by end of shift  10/17/2023 0220 by Ginny Torrez RN  Outcome: Progressing  10/17/2023 0220 by Ginny Torrez RN  Outcome: Progressing  Goal: Decrease in ketones present in urine by end of shift  10/17/2023 0220 by Ginny Torrez RN  Outcome: Progressing  10/17/2023 0220 by Ginny Torrez RN  Outcome: Progressing  Goal: Maintain electrolyte levels within acceptable range throughout shift  10/17/2023 0220 by Ginny Torrez RN  Outcome: Progressing  10/17/2023 0220 by Ginny Torrez RN  Outcome: Progressing  Goal: Maintain glucose levels >70mg/dl to <250mg/dl throughout shift  10/17/2023 0220 by Ginny Torrez RN  Outcome: Progressing  10/17/2023 0220 by Gniny Torrez RN  Outcome: Progressing  Goal: No changes in neurological exam by end of shift  10/17/2023 0220 by Ginny Torrez RN  Outcome: Progressing  10/17/2023 0220 by Ginny Torrez RN  Outcome: Progressing  Goal: Learn about and adhere to nutrition recommendations by end of shift  10/17/2023 0220 by Ginny Torrez RN  Outcome: Progressing  10/17/2023 0220 by Ginny Torrez RN  Outcome: Progressing  Goal: Vital signs within normal range for age by end of shift  10/17/2023 0220 by Ginny Torrez RN  Outcome: Progressing  10/17/2023 0220 by Ginny Torrez RN  Outcome: Progressing  Goal: Increase self care and/or family involovement by end of shift  10/17/2023 0220 by Ginny Torrez RN  Outcome: Progressing  10/17/2023 0220 by Ginny Torrez RN  Outcome: Progressing  Goal: Receive DSME education by end of shift  10/17/2023 0220 by Ginny Torrez RN  Outcome: Progressing  10/17/2023 0220 by Ginny Torrez RN  Outcome: Progressing     Problem: Skin  Goal: Decreased wound size/increased tissue granulation at next  dressing change  Outcome: Progressing  Flowsheets (Taken 10/17/2023 0220)  Decreased wound size/increased tissue granulation at next dressing change: Promote sleep for wound healing  Goal: Participates in plan/prevention/treatment measures  Outcome: Progressing  Flowsheets (Taken 10/17/2023 0220)  Participates in plan/prevention/treatment measures: Elevate heels  Goal: Prevent/manage excess moisture  Outcome: Progressing  Flowsheets (Taken 10/17/2023 0220)  Prevent/manage excess moisture: Cleanse incontinence/protect with barrier cream  Goal: Prevent/minimize sheer/friction injuries  Outcome: Progressing  Flowsheets (Taken 10/17/2023 0220)  Prevent/minimize sheer/friction injuries: HOB 30 degrees or less  Goal: Promote/optimize nutrition  Outcome: Progressing  Flowsheets (Taken 10/17/2023 0220)  Promote/optimize nutrition: Monitor/record intake including meals  Goal: Promote skin healing  Outcome: Progressing  Flowsheets (Taken 10/17/2023 0220)  Promote skin healing: Assess skin/pad under line(s)/device(s)   The patient's goals for the shift include      The clinical goals for the shift include pain control

## 2023-10-17 NOTE — PROGRESS NOTES
Lupis Jackson 89 y.o. female    Subjective  Patient seen and examined this morning.  Denies nausea and vomiting.  No fever or chills. Tolerating clear liquids.  No flatus or BM as of yet.  Pain controlled.  Gibson with clear yellow urine. Up ambulating. No acute events overnight.    Objective    PHYSICAL EXAM:  Physical Exam  Vitals reviewed.   Constitutional:       General: She is awake.      Appearance: Normal appearance.   Cardiovascular:      Rate and Rhythm: Normal rate and regular rhythm.      Pulses: Normal pulses.      Heart sounds: Normal heart sounds.   Pulmonary:      Effort: Pulmonary effort is normal.      Breath sounds: Normal breath sounds and air entry.   Abdominal:      General: Abdomen is flat. There is no distension.      Palpations: Abdomen is soft.      Comments: Abdominal dressing with shadow drainage.  Appropriately TTP.     Genitourinary:     Comments: Gibson catheter with clear yellow urine.   Musculoskeletal:         General: Normal range of motion.      Cervical back: Normal range of motion.   Skin:     General: Skin is warm and dry.   Neurological:      General: No focal deficit present.      Mental Status: She is alert and oriented to person, place, and time.   Psychiatric:         Behavior: Behavior is cooperative.         Vital signs in last 24 hours:  Vitals:    10/17/23 0745   BP: 114/68   Pulse: 91   Resp: 16   Temp: 36.5 °C (97.7 °F)   SpO2: 98%        Intake/Output this shift:  I/O last 3 completed shifts:  In: 2468.3 (41.9 mL/kg) [I.V.:2468.3 (41.9 mL/kg)]  Out: 920 (15.6 mL/kg) [Urine:910 (0.4 mL/kg/hr); Blood:10]  Weight: 58.9 kg      Allergies:  No Known Allergies     Medications:  Scheduled medications  atorvastatin, 10 mg, oral, q PM  carvedilol, 3.125 mg, oral, BID with meals  heparin (porcine), 5,000 Units, subcutaneous, q8h ASHELY  insulin lispro, 0-5 Units, subcutaneous, TID with meals  levothyroxine, 112 mcg, oral, Daily  lisinopril, 2.5 mg, oral, Daily  PARoxetine, 30 mg, oral,  Daily      Continuous medications  lactated Ringer's, 50 mL/hr, Last Rate: 50 mL/hr (10/17/23 0637)      PRN medications  PRN medications: dextrose 10 % in water (D10W), dextrose, glucagon, meclizine, naloxone, ondansetron ODT **OR** ondansetron, oxyCODONE, oxyCODONE      Labs:  Results for orders placed or performed during the hospital encounter of 10/16/23 (from the past 24 hour(s))   CBC   Result Value Ref Range    WBC 8.2 4.4 - 11.3 x10*3/uL    nRBC 0.0 0.0 - 0.0 /100 WBCs    RBC 3.64 (L) 4.00 - 5.20 x10*6/uL    Hemoglobin 11.0 (L) 12.0 - 16.0 g/dL    Hematocrit 34.5 (L) 36.0 - 46.0 %    MCV 95 80 - 100 fL    MCH 30.2 26.0 - 34.0 pg    MCHC 31.9 (L) 32.0 - 36.0 g/dL    RDW 13.2 11.5 - 14.5 %    Platelets 382 150 - 450 x10*3/uL    MPV 10.5 7.5 - 11.5 fL   Basic Metabolic Panel   Result Value Ref Range    Glucose 163 (H) 74 - 99 mg/dL    Sodium 134 (L) 136 - 145 mmol/L    Potassium 4.1 3.5 - 5.3 mmol/L    Chloride 102 98 - 107 mmol/L    Bicarbonate 27 21 - 32 mmol/L    Anion Gap 9 (L) 10 - 20 mmol/L    Urea Nitrogen 13 6 - 23 mg/dL    Creatinine 0.71 0.50 - 1.05 mg/dL    eGFR 77 >60 mL/min/1.73m*2    Calcium 9.0 8.6 - 10.3 mg/dL   Basic metabolic panel   Result Value Ref Range    Glucose 115 (H) 74 - 99 mg/dL    Sodium 133 (L) 136 - 145 mmol/L    Potassium 3.7 3.5 - 5.3 mmol/L    Chloride 100 98 - 107 mmol/L    Bicarbonate 26 21 - 32 mmol/L    Anion Gap 11 10 - 20 mmol/L    Urea Nitrogen 10 6 - 23 mg/dL    Creatinine 0.67 0.50 - 1.05 mg/dL    eGFR 80 >60 mL/min/1.73m*2    Calcium 8.8 8.6 - 10.3 mg/dL   CBC   Result Value Ref Range    WBC 8.8 4.4 - 11.3 x10*3/uL    nRBC 0.0 0.0 - 0.0 /100 WBCs    RBC 3.27 (L) 4.00 - 5.20 x10*6/uL    Hemoglobin 9.8 (L) 12.0 - 16.0 g/dL    Hematocrit 31.6 (L) 36.0 - 46.0 %    MCV 97 80 - 100 fL    MCH 30.0 26.0 - 34.0 pg    MCHC 31.0 (L) 32.0 - 36.0 g/dL    RDW 13.2 11.5 - 14.5 %    Platelets 338 150 - 450 x10*3/uL    MPV 10.8 7.5 - 11.5 fL        Imaging:  No results found.      Plan  POD#1: s/p laparoscopic right danette-colectomy with side-to-side ileocolic anastomosis    #Malignant neoplasm of ascending colon  - surgery as above  - avss  - Diet: clear liquids   - Pain control  - Nausea: antiemetics PRN  - Encourage OOB and IS  - DVT prophylaxis: heparin 5000u subcutaneous Q8  - Remove pyle catheter  - DC IVF    #HLD  - Home atorvastatin resume    #HTN  - Home coreg and lisinopril resumed    #Hypothyroidism  - Home levothyroxine resumed    #Diabetes mellitus:  - Accu checks with sliding coverage    - Daily labs  - PT/OT consult       Plan of care discussed and patient seen with Dr. Pedro Luis Mendenhall, APRN-CNP    I spent 15 minutes in the professional and overall care of this patient.

## 2023-10-17 NOTE — OP NOTE
Resection Laparoscopy Large Intestine Operative Note     Date: 10/16/2023  OR Location: [facility]    Name: Lupis Jackson : 3/14/1926, Age: 89 y.o., MRN: [id number], Sex: female    Diagnosis  Pre-op Diagnosis     * Malignant neoplasm of ascending colon (CMS/HCC) [C18.2] Post-op Diagnosis     * Malignant neoplasm of ascending colon (CMS/HCC) [C18.2]     Procedures  Resection Laparoscopy Large Intestine  20775 - MA LAPS COLECTOMY TOT W/O PRCTECT W/ILEOST/ILEOPXTS      Surgeons      * Chirag Cloud - Primary    Resident/Fellow/Other Assistant:  No surgical staff documented.    Procedure Summary  Anesthesia: General  ASA: III  Anesthesia Staff: Anesthesiologist: Melvina Liz MD  C-AA: BOYD Capellan  Estimated Blood Loss: 15mL  Intra-op Medications:   Medication Name Total Dose   ceFAZolin in dextrose (iso-os) (Ancef) IVPB 2 g 2 g   metroNIDAZOLE in NaCl (iso-os) (Flagyl)  mg 500 mg   lactated Ringer's infusion Cannot be calculated              Anesthesia Record               Intraprocedure I/O Totals          Intake    Phenylephrine Drip 0.00 mL    The total shown is the total volume documented since Anesthesia Start was filed.    lactated Ringer's infusion 1000.00 mL    Total Intake 1000 mL       Output    Urine 200 mL    Est. Blood Loss 10 mL    Total Output 210 mL       Net    Net Volume 790 mL          Specimen:   ID Type Source Tests Collected by Time   1 : RIGHT COLON Tissue COLON - RIGHT HEMICOLECTOMY SURGICAL PATHOLOGY EXAM Chirag Cloud MD 10/16/2023 1125        Staff:   Circulator: GIAN Correia-CNP  Scrub Person: Magdy Ballard         Drains and/or Catheters:   Urethral Catheter Straight-tip;Latex 16 Fr. (Active)   Site Assessment Clean;Skin intact 10/16/23 1445   Collection Container Standard drainage bag 10/16/23 1445   Securement Method Securing device (Describe) 10/16/23 1445   Reason for Continuing Urinary Catheterization surgical procedures: urological/gynecological, pelvic oncology,  anal, prolonged surgical procedure 10/16/23 1445   Output (mL) 110 mL 10/16/23 1445       Tourniquet Times:         Implants: N/A    Findings: N/A    Indications: Lupis Jackson is an 89 y.o. female who is having surgery for Malignant neoplasm of ascending colon (CMS/HCC) [C18.2].     The patient was seen in the preoperative area. The risks, benefits, complications, treatment options, non-operative alternatives, expected recovery and outcomes were discussed with the patient. The possibilities of reaction to medication, pulmonary aspiration, injury to surrounding structures, bleeding, recurrent infection, the need for additional procedures, failure to diagnose a condition, and creating a complication requiring transfusion or operation were discussed with the patient. The patient concurred with the proposed plan, giving informed consent.  The site of surgery was properly noted/marked if necessary per policy. The patient has been actively warmed in preoperative area. Preoperative antibiotics have been ordered and given within 1 hours of incision. Venous thrombosis prophylaxis have been ordered including bilateral sequential compression devices    Procedure Details:   Safety checklist was performed in the preoperative area confirming correct patient and correct procedure.  The patient was brought to the operating room.  Sequential compression devices were applied to bilateral lower extremities.  Following induction of general anesthesia the patient was placed in supine position with bilateral arms tucked.  Careful attention was paid to appropriate padding of pressure points.  A 16Fr silicone pyle catheter was inserted into the bladder using sterile technique.  The abdominal wall was prepped with chloraprep and the patient draped in the usual sterile fashion.  A time out was performed, once again confirming correct patient and correct procedure.  Perioperative antibiotic and DVT chemoprophylaxis was administered.     A  periumbilical incision was created in the skin using #15 scalpel blade after the periumbilical soft tissues were anesthetized with 0.5% marcaine.  Using electrocautery, incisional opening was carried down through soft tissues to the level of the fascia which was grasped and divided between Kocher clamps using the #15 scalpel.  There were no adhesions at the fascial entry site and no injuries to the intra-abdominal viscera.  The fascial opening was extended the length of the skin incision using electrocautery.  The NiteTables laparoscopic system loaded with a 12mm robotic port was inserted into the abdomen and carbon dioxide pneumoperitoneum established.  The 30 degree laparoscope was inserted into the abdomen and a diagnostic laparoscopy performed.  There was no evidence of widespread metastatic disease such as peritoneal carcinomatosis.  The hepatic surfaces were unremarkable.      Two 5mm laparoscopic ports were placed into the abdomen under direct laparoscopic vision with one in the LUQ and the other in the LLQ.  Using the laparoscopic bowel atraumatic bowel graspers, the greater omentum was reflected superiorly.  The small bowel was reflected towards the patient's left.  A medial to lateral dissection was initiated.  The ileocolic pedicle was identified and placed on gentle tension with anterior retraction of the cecum.  The peritoneum just below the pedicle was opened with vessel sealer.  The right colon was mobilized off the posterior retroperitoneum.  The space was extended laterally towards the abdominal wall and the white line of Toldt.  Additional dissection was performed superiorly towards the hepatic flexure and medially to the duodenum and head of the pancreas.  Care was taken to avoid injury to the duodenum.  A high ligation of the ileocolic pedicle was performed using the vessel sealer.  Additional right colon mesentery was divided towards the proximal transverse colon.  Again, care was taken to  avoid injury to the duodenum.  There was no defined right colic artery.      The lesser sac was then entered by dissecting the greater omentum off the superior border of the mid-transverse colon.  This window was extended proximally along the colon to fully mobilize the hepatic flexure and then down along the white line of toldt to the level of the cecum.  The cecum and terminal ileum were mobilized from the right lower quadrant and lower retroperitoneum.  The original opening of the mesentery was then extended to the mesenteric borders of the terminal ileum and proximal transverse colon.      The patient was brought out of trendelenberg and left side down position. The cap of the Joe laparoscopic system was removed and carbon dioxide pneumoperitoneum released.  The right colon was eviscerated from the abdomen through the wound protector.  The ileum and colon were divided using linear 75mm blue load stapler.  The ileum and transverse colon were aligned to create a side-to-side ileocolic anastomosis.  Corners of the ileal and colonic staple lines were cut using curved Ochoa scissors to create enterotomy and colotomy.  The 75mm linear blue load stapler was introduced through the enterotomy and colotomy.  The stapler was closed.  A finger sweep to ensure no mesentery was brought into the staple line was performed.  The stapler was fired to create the ileocolic anastomosis.  The stapler was opened and removed.  The staple line of the anastomosis was inspected for hemostasis.  The common opening was closed with a TX-60mm stapler blue load.  The staple line was reinforced with a running 2-0 vicryl stitch for hemostatic purposes.  A 3-0 vicryl seromuscular stitch was placed at the crotch.  The anastomosis was inspected.  It was pink, healthy, hemostatic.  The anastomosis was returned to the abdomen.  The wound protector was removed along with the other laparoscopic ports.    Preliminary count of surgical instruments,  needles and sponges was performed and confirmed to be correct.  The midline umbilical fascial opening was closed with multiple #1 non-looped PDS sutures placed in figure-of-eight fashion.  Final count was performed and confirmed to be correct.  The subcutaneous tissue at the incisions was anesthetized with additional 0.5% marcaine.  The skin was then reapproximated with skin stapler.  The abdomen was cleansed with a moistened sponge and dried.  A sign out was performed.  The patient was awakened and taken to the recovery area in stable condition.    Complications:  None; patient tolerated the procedure well.    Disposition: PACU - hemodynamically stable.  Condition: stable     Colon Resection  Operation performed with curative intent Yes   Tumor Location Ascending colon   Extent of colon and vascular resection Right hemicolectomy - ileocolic, right colic (if present)          Additional Details: N/A    Attending Attestation: I was present and scrubbed for the entire procedure.    Chirag Cloud  Phone Number: [phone]

## 2023-10-17 NOTE — CONSULTS
"Assessment Subjective/Objective:   Note Type: Education   Reason for Assessment: auto referral for CORS  Note Authored by: Registered Dietitian Nutritionist   Pager Number: [phone] secure chat     Nutrition Note:  Lupis Jackson is a 89 y.o. female admitted to [facility] after right hemicolectomy with ileocolic anastomosis.  Pt had likely malignant tumor found during colonoscopy in June.  The colonoscopy had been completed due to pt having diarrhea.     Past Medical History   has a past medical history of Breast cancer (CMS/[facility]), Cataract, Colorectal cancer (CMS/[facility]), Coronary artery disease, Dizziness, Essential (primary) hypertension (04/07/2022), HL (hearing loss), Hyperlipidemia, unspecified (07/26/2021), Hypothyroidism, Myocardial infarction (CMS/[facility]), Presence of coronary angioplasty implant and graft (07/27/2020), Type 2 diabetes mellitus without complications (CMS/[facility]) (12/15/2022), and Vitamin D deficiency, unspecified (04/22/2019).  Surgical History   has a past surgical history that includes Other surgical history (12/10/2018); Colonoscopy; and Cataract extraction.    Objective Information:    Pain: Pain Assessment: 0-10  Pain Score: 0 - No pain  Pain Type: Surgical pain  Pain Location: Abdomen  Pain Orientation: Left; Lower  Pain Frequency: Constant/continuous    Vitals: Blood pressure 105/62, pulse 60, temperature 36.5 °C (97.7 °F), temperature source Temporal, resp. rate 18, height 1.676 m (5' 6\"), weight 58.9 kg (129 lb 12.6 oz), SpO2 96 %.    Height/Weight:  Height: 167.6cm   Weight (kg): 58.9kg   BMI (kg/m2): 21   Weight history/ % weight change:   9/20/23 121#  8/23/23 123.25#  6/16/23 124#  10/13/22 128#  Significant Weight Loss: no, weight stable over the past year per archives.     Recent Lab Results:  Lab Results   Component Value Date    GLUCOSE 115 (H) 10/17/2023    CALCIUM 8.8 10/17/2023     (L) 10/17/2023    K 3.7 10/17/2023    CO2 26 10/17/2023     10/17/2023    BUN 10 10/17/2023    " CREATININE 0.67 10/17/2023       Medications:  Scheduled medications  atorvastatin, 10 mg, oral, q PM  carvedilol, 3.125 mg, oral, BID with meals  heparin (porcine), 5,000 Units, subcutaneous, q8h ASHELY  insulin lispro, 0-5 Units, subcutaneous, TID with meals  levothyroxine, 112 mcg, oral, Daily  lisinopril, 2.5 mg, oral, Daily  PARoxetine, 30 mg, oral, Daily      Nutrition Orders:  Dietary Orders (From admission, onward)       Start     Ordered    10/16/23 1503  Adult diet Clear Liquid  Diet effective now        Question:  Diet type  Answer:  Clear Liquid    10/16/23 1502                     Food/Nutrition Related History:  Energy Intake:  (Clear liquid diet)  Food and Nutrient History: Pt hard of hearing, family present.  Pt denies any GI discomfort, no nausea/vomiting.  She has been taking in small amounts of clear liquids without issue.  Family agreeable to addition of Ensure clear while on clear liquids and changing to Ensure HP once at least full liquids.  Pt was consuming Ensure at home once daily and likes vanilla or chocolate.  She normally eats three meals daily and denies any food intolerances/allergies.    Oral Problems: denies any difficulty   Mobility: difficulty with normal activity     Nutrition Diagnosis:   Patient has Nutrition Diagnosis: Yes  Nutrition Diagnosis 1: Altered GI function  Diagnosis Status (1): New  Related to (1): malignant neoplasm of ascending colon  As Evidenced by (1): pt requiring surgical intervention now s/p right hemicolectomy with ileocolic anastamosis.    Nutrition Goals:  Less than 5 days NPO/clear liquids, oral intake >50%, oral intake, provided oral supplement, adequate PO fluid intakes, lab values within normal limits, electrolytes within normal limits   Nutrition Goal Outcomes: Will assess progress towards goals upon follow-up.     Nutrition Therapy Interventions/Recommendations:   Recommendations:   Diet: continue clears liquids and advance per MD to suggested goal of  GI soft  Oral Nutrition Supplements:  Recommend Ensure Clear TID with meals (240kcal, 8g pro each) while on clear liquids, once advanced to full liquids recommend Ensure HP TID with meals (160kcal, 16g pro)  Coordination of care: nursing  Nutrition Education:  Provided and discussed Low Fiber Nutrition Therapy handout from [facility], discussed fiber sources, rationale for low fiber diet post surgery, and discussed use or oral supplement to promote protein/calorie intakes.  Family at pt voice understanding.     Dietitian Monitoring and Evaluation Plan:  Monitoring and Evaluation Plan: PO intake/tolerance, fluid intake/adequacy, weight trend, skin healing/integrity, overall appearance, meal behavior, digestive function,  labs     Follow Up  Time Spent (min): 60 minutes  Last Date of Nutrition Visit: 10/17/23  Nutrition Follow-Up Needed?: 3-5 days

## 2023-10-18 LAB
ALBUMIN SERPL BCP-MCNC: 3.2 G/DL (ref 3.4–5)
ALP SERPL-CCNC: 55 U/L (ref 33–136)
ALT SERPL W P-5'-P-CCNC: 11 U/L (ref 7–45)
ANION GAP SERPL CALC-SCNC: 10 MMOL/L (ref 10–20)
AST SERPL W P-5'-P-CCNC: 18 U/L (ref 9–39)
BILIRUB SERPL-MCNC: 0.4 MG/DL (ref 0–1.2)
BUN SERPL-MCNC: 10 MG/DL (ref 6–23)
CALCIUM SERPL-MCNC: 9.1 MG/DL (ref 8.6–10.3)
CHLORIDE SERPL-SCNC: 99 MMOL/L (ref 98–107)
CO2 SERPL-SCNC: 29 MMOL/L (ref 21–32)
CREAT SERPL-MCNC: 0.66 MG/DL (ref 0.5–1.05)
ERYTHROCYTE [DISTWIDTH] IN BLOOD BY AUTOMATED COUNT: 12.9 % (ref 11.5–14.5)
GFR SERPL CREATININE-BSD FRML MDRD: 80 ML/MIN/1.73M*2
GLUCOSE SERPL-MCNC: 153 MG/DL (ref 74–99)
HCT VFR BLD AUTO: 31.2 % (ref 36–46)
HGB BLD-MCNC: 9.8 G/DL (ref 12–16)
MAGNESIUM SERPL-MCNC: 1.59 MG/DL (ref 1.6–2.4)
MCH RBC QN AUTO: 30.5 PG (ref 26–34)
MCHC RBC AUTO-ENTMCNC: 31.4 G/DL (ref 32–36)
MCV RBC AUTO: 97 FL (ref 80–100)
NRBC BLD-RTO: 0 /100 WBCS (ref 0–0)
PHOSPHATE SERPL-MCNC: 2.7 MG/DL (ref 2.5–4.9)
PLATELET # BLD AUTO: 343 X10*3/UL (ref 150–450)
PMV BLD AUTO: 10.8 FL (ref 7.5–11.5)
POTASSIUM SERPL-SCNC: 3.6 MMOL/L (ref 3.5–5.3)
PROT SERPL-MCNC: 5.5 G/DL (ref 6.4–8.2)
RBC # BLD AUTO: 3.21 X10*6/UL (ref 4–5.2)
SODIUM SERPL-SCNC: 134 MMOL/L (ref 136–145)
WBC # BLD AUTO: 7.4 X10*3/UL (ref 4.4–11.3)

## 2023-10-18 PROCEDURE — 2500000004 HC RX 250 GENERAL PHARMACY W/ HCPCS (ALT 636 FOR OP/ED): Performed by: NURSE PRACTITIONER

## 2023-10-18 PROCEDURE — 2500000004 HC RX 250 GENERAL PHARMACY W/ HCPCS (ALT 636 FOR OP/ED): Performed by: STUDENT IN AN ORGANIZED HEALTH CARE EDUCATION/TRAINING PROGRAM

## 2023-10-18 PROCEDURE — 36415 COLL VENOUS BLD VENIPUNCTURE: CPT | Performed by: NURSE PRACTITIONER

## 2023-10-18 PROCEDURE — 97112 NEUROMUSCULAR REEDUCATION: CPT | Mod: GO

## 2023-10-18 PROCEDURE — 97165 OT EVAL LOW COMPLEX 30 MIN: CPT | Mod: GO

## 2023-10-18 PROCEDURE — 96372 THER/PROPH/DIAG INJ SC/IM: CPT | Performed by: STUDENT IN AN ORGANIZED HEALTH CARE EDUCATION/TRAINING PROGRAM

## 2023-10-18 PROCEDURE — 84100 ASSAY OF PHOSPHORUS: CPT | Performed by: NURSE PRACTITIONER

## 2023-10-18 PROCEDURE — 99231 SBSQ HOSP IP/OBS SF/LOW 25: CPT | Performed by: NURSE PRACTITIONER

## 2023-10-18 PROCEDURE — 83735 ASSAY OF MAGNESIUM: CPT | Performed by: NURSE PRACTITIONER

## 2023-10-18 PROCEDURE — 1200000002 HC GENERAL ROOM WITH TELEMETRY DAILY

## 2023-10-18 PROCEDURE — 97161 PT EVAL LOW COMPLEX 20 MIN: CPT | Mod: GP

## 2023-10-18 PROCEDURE — 85027 COMPLETE CBC AUTOMATED: CPT | Performed by: NURSE PRACTITIONER

## 2023-10-18 PROCEDURE — 2500000001 HC RX 250 WO HCPCS SELF ADMINISTERED DRUGS (ALT 637 FOR MEDICARE OP): Performed by: STUDENT IN AN ORGANIZED HEALTH CARE EDUCATION/TRAINING PROGRAM

## 2023-10-18 PROCEDURE — 80053 COMPREHEN METABOLIC PANEL: CPT | Performed by: NURSE PRACTITIONER

## 2023-10-18 RX ADMIN — ATORVASTATIN CALCIUM 10 MG: 10 TABLET, FILM COATED ORAL at 21:36

## 2023-10-18 RX ADMIN — SODIUM CHLORIDE, SODIUM LACTATE, POTASSIUM CHLORIDE, AND CALCIUM CHLORIDE 500 ML: 600; 310; 30; 20 INJECTION, SOLUTION INTRAVENOUS at 09:15

## 2023-10-18 RX ADMIN — PAROXETINE 30 MG: 20 TABLET, FILM COATED ORAL at 10:38

## 2023-10-18 RX ADMIN — HEPARIN SODIUM 5000 UNITS: 5000 INJECTION INTRAVENOUS; SUBCUTANEOUS at 10:37

## 2023-10-18 RX ADMIN — HEPARIN SODIUM 5000 UNITS: 5000 INJECTION INTRAVENOUS; SUBCUTANEOUS at 21:36

## 2023-10-18 RX ADMIN — LEVOTHYROXINE SODIUM 112 MCG: 0.11 TABLET ORAL at 10:38

## 2023-10-18 ASSESSMENT — COGNITIVE AND FUNCTIONAL STATUS - GENERAL
DAILY ACTIVITIY SCORE: 20
MOVING TO AND FROM BED TO CHAIR: A LITTLE
WALKING IN HOSPITAL ROOM: A LITTLE
CLIMB 3 TO 5 STEPS WITH RAILING: A LITTLE
MOBILITY SCORE: 20
HELP NEEDED FOR BATHING: A LITTLE
DRESSING REGULAR UPPER BODY CLOTHING: A LITTLE
STANDING UP FROM CHAIR USING ARMS: A LITTLE
TOILETING: A LITTLE
DRESSING REGULAR LOWER BODY CLOTHING: A LITTLE

## 2023-10-18 ASSESSMENT — PAIN - FUNCTIONAL ASSESSMENT
PAIN_FUNCTIONAL_ASSESSMENT: 0-10
PAIN_FUNCTIONAL_ASSESSMENT: 0-10

## 2023-10-18 ASSESSMENT — ACTIVITIES OF DAILY LIVING (ADL)
ADL_ASSISTANCE: INDEPENDENT
BATHING_ASSISTANCE: MINIMAL

## 2023-10-18 ASSESSMENT — PAIN SCALES - GENERAL
PAINLEVEL_OUTOF10: 0 - NO PAIN

## 2023-10-18 NOTE — CARE PLAN
The patient's goals for the shift include      The clinical goals for the shift include pain control, maintain comfort, safety, maintain HD stable  .

## 2023-10-18 NOTE — PROGRESS NOTES
Physical Therapy    Physical Therapy    Physical Therapy Evaluation    Patient Name: Lupis Jackson  MRN: [id number]  Today's Date: 10/18/2023   Time Calculation  Start Time: 1226  Stop Time: 1237  Time Calculation (min): 11 min    Assessment/Plan   PT Assessment  PT Assessment Results: Decreased strength, Decreased range of motion, Decreased endurance, Impaired balance, Decreased mobility, Impaired hearing  Rehab Prognosis: Good  Evaluation/Treatment Tolerance: Patient tolerated treatment well  Medical Staff Made Aware: Yes  End of Session Communication: Bedside nurse  Assessment Comment: Pt presents today below baseline level of function and requires continued PT during hospital stay. Pt would benefit from PT at a low intensity level at discharge to maximize functional mobility and safety.    End of Session Patient Position: Up in chair (OT present)  IP OR SWING BED PT PLAN  Inpatient or Swing Bed: Inpatient  PT Plan  Treatment/Interventions: Bed mobility, Transfer training, Gait training, Balance training, Neuromuscular re-education, Strengthening, Endurance training, Range of motion, Therapeutic exercise, Therapeutic activity, Home exercise program, Stair training  PT Plan: Skilled PT  PT Frequency: Daily  PT Discharge Recommendations: Low intensity level of continued care  PT Recommended Transfer Status: Assist x1    Subjective     Current Problem:  1. Malignant neoplasm of ascending colon (CMS/HCC)  Surgical Pathology Exam    Surgical Pathology Exam        Past Medical History:   Diagnosis Date    Breast cancer (CMS/HCC)     Cataract     Colorectal cancer (CMS/HCC)     Coronary artery disease     Dizziness     Essential (primary) hypertension 04/07/2022    Essential hypertension    HL (hearing loss)     Hyperlipidemia, unspecified 07/26/2021    Hyperlipidemia    Hypothyroidism     Myocardial infarction (CMS/HCC)     Presence of coronary angioplasty implant and graft 07/27/2020    History of coronary artery stent  placement    Type 2 diabetes mellitus without complications (CMS/[facility]) 12/15/2022    Diabetes mellitus    Vitamin D deficiency, unspecified 04/22/2019    Vitamin D deficiency     Past Surgical History:   Procedure Laterality Date    CATARACT EXTRACTION      COLONOSCOPY      OTHER SURGICAL HISTORY  12/10/2018    Right mastectomy       General Visit Information:  General  Reason for Referral: abdominal surgery  Referred By: Dr. Chirag Cloud  Past Medical History Relevant to Rehab: POD 2 s/p laparoscopic right danette-colectomy with side-to-side ileocolic anastomosis  Co-Treatment: OT  Co-Treatment Reason: to facilitate pt safety and functional mobility  Prior to Session Communication: Bedside nurse  Patient Position Received: Bed, 3 rail up, Alarm on  Preferred Learning Style:  (Pt is very [place] but is able to lip read)  General Comment: Pt agreeable to PT, nursing cleared for treatment.    Home Living:  Home Living  Type of Home: House  Lives With: Alone  Home Adaptive Equipment: Walker rolling or standard, Cane  Home Layout: Multi-level, Stairs to alternate level with rails (1/2 bath and laundry lower level, bed/full bath upstairs)  Alternate Level Stairs-Number of Steps: 3 with rail to lower level, 8 with rail to upper level  Home Access: Stairs to enter without rails  Entrance Stairs-Number of Steps: 2  Bathroom Shower/Tub: Tub/shower unit  Bathroom Equipment: Grab bars in shower (SC)  Home Living Comments: niece is able to stay with pt at discharge and nephew will be moving in with pt soon    Prior Level of Function:  Prior Function Per Pt/Caregiver Report  ADL Assistance: Independent  Homemaking Assistance: Independent  Ambulatory Assistance: Independent  Prior Function Comments: denies falls in the past 6 months    Precautions:  Precautions  Hearing/Visual Limitations: [place]  Medical Precautions: Fall precautions  Post-Surgical Precautions: Abdominal surgery precautions    Vital Signs:     Objective     Pain:  Pain  Assessment  Pain Assessment: 0-10  Pain Score: 0 - No pain    Cognition:  Cognition  Overall Cognitive Status: Within Functional Limits  Orientation Level: Oriented X4    General Assessments:      Activity Tolerance  Endurance: Endurance does not limit participation in activity                 Static Sitting Balance  Static Sitting-Comment/Number of Minutes: good  Dynamic Sitting Balance  Dynamic Sitting-Comments: good  Static Standing Balance  Static Standing-Comment/Number of Minutes: good  Dynamic Standing Balance  Dynamic Standing-Comments: fair    Functional Assessments:     Bed Mobility  Bed Mobility: Yes  Bed Mobility 1  Bed Mobility 1: Supine to sitting  Level of Assistance 1:  (mod I)  Transfers  Transfer: Yes  Transfer 1  Transfer From 1: Sit to  Transfer to 1: Stand  Transfer Device 1: Walker  Transfer Level of Assistance 1: Close supervision  Transfers 2  Transfer From 2: Stand to  Transfer to 2: Sit  Transfer Device 2: Walker  Transfer Level of Assistance 2: Close supervision  Ambulation/Gait Training  Ambulation/Gait Training Performed: Yes  Ambulation/Gait Training 1  Device 1: Rolling walker  Assistance 1: Contact guard  Comments/Distance (ft) 1: 150 feet x 2, increased distance from FWW and flexed posture      Instruction in safe activity pacing and proper use of FWW for transfers and ambulation.     Extremity/Trunk Assessments:        RLE   RLE : Within Functional Limits  LLE   LLE : Within Functional Limits    Outcome Measures:  [facility] Basic Mobility  Turning from your back to your side while in a flat bed without using bedrails: None  Moving from lying on your back to sitting on the side of a flat bed without using bedrails: None  Moving to and from bed to chair (including a wheelchair): A little  Standing up from a chair using your arms (e.g. wheelchair or bedside chair): A little  To walk in hospital room: A little  Climbing 3-5 steps with railing: A little  Basic Mobility - Total Score: 20                             Goals:  Encounter Problems       Encounter Problems (Active)       PT Problem       Pt will progress to completing 3 x 20 supine/seated exercises in order to increase strength and improve gait mechanics.         Start:  10/18/23    Expected End:  11/01/23            Pt will complete sit <> stand and bed <> chair transfers with mod I.        Start:  10/18/23    Expected End:  11/01/23            Pt will ambulate 300 feet mod I using FWW with no significant gait deviations.         Start:  10/18/23    Expected End:  11/01/23            Pt will ascend/descend at least 8 stairs using rail in order to navigate home environment.         Start:  10/18/23    Expected End:  11/01/23                 Education Documentation  Precautions, taught by Ashley Yee PT at 10/18/2023  2:47 PM.  Learner: Patient  Readiness: Acceptance  Method: Demonstration  Response: Verbalizes Understanding, Demonstrated Understanding    Body Mechanics, taught by Ashley Yee PT at 10/18/2023  2:47 PM.  Learner: Patient  Readiness: Acceptance  Method: Demonstration  Response: Verbalizes Understanding, Demonstrated Understanding    Mobility Training, taught by Ashley Yee PT at 10/18/2023  2:47 PM.  Learner: Patient  Readiness: Acceptance  Method: Demonstration  Response: Verbalizes Understanding, Demonstrated Understanding    Body Mechanics, taught by Ashley Yee PT at 10/18/2023  2:47 PM.  Learner: Patient  Readiness: Acceptance  Method: Demonstration  Response: Verbalizes Understanding, Demonstrated Understanding    Precautions, taught by Ashley Yee PT at 10/18/2023  2:47 PM.  Learner: Patient  Readiness: Acceptance  Method: Demonstration  Response: Verbalizes Understanding, Demonstrated Understanding    Education Comments  No comments found.

## 2023-10-18 NOTE — PROGRESS NOTES
Lupis Jackson 89 y.o. female    Subjective  Patient seen and examined this morning.  Denies nausea and vomiting.  No fever or chills.  Does endorse some dizziness and lightheadedness this morning.  Denies CP and SOB.  Urinating well.  Passing some flatus and had a bowel movement. Tolerating clear liquids. Soft blood pressure this morning.       Objective    PHYSICAL EXAM:  Physical Exam  Vitals reviewed.   Constitutional:       General: She is awake.      Appearance: Normal appearance.   Cardiovascular:      Rate and Rhythm: Normal rate and regular rhythm.      Pulses: Normal pulses.      Heart sounds: Normal heart sounds.   Pulmonary:      Effort: Pulmonary effort is normal.      Breath sounds: Normal breath sounds and air entry.   Abdominal:      General: Abdomen is flat. There is no distension.      Palpations: Abdomen is soft.      Comments: Abdominal incision with staples intact, C/D/I.   Appropriately TTP.     Genitourinary:     Comments: No pyle catheter present.   Musculoskeletal:         General: Normal range of motion.      Cervical back: Normal range of motion.   Skin:     General: Skin is warm and dry.   Neurological:      General: No focal deficit present.      Mental Status: She is alert and oriented to person, place, and time.   Psychiatric:         Behavior: Behavior is cooperative.         Vital signs in last 24 hours:  Vitals:    10/18/23 0800   BP: (!) 87/39   Pulse: 98   Resp: 16   Temp: 37 °C (98.6 °F)   SpO2: 94%         Intake/Output this shift:    Intake/Output Summary (Last 24 hours) at 10/18/2023 1053  Last data filed at 10/18/2023 0704  Gross per 24 hour   Intake 960 ml   Output 750 ml   Net 210 ml        Allergies:  No Known Allergies     Medications:  Scheduled medications  atorvastatin, 10 mg, oral, q PM  carvedilol, 3.125 mg, oral, BID with meals  heparin (porcine), 5,000 Units, subcutaneous, q8h ASHELY  insulin lispro, 0-5 Units, subcutaneous, TID with meals  lactated Ringer's, 500 mL,  intravenous, Once  levothyroxine, 112 mcg, oral, Daily  lisinopril, 2.5 mg, oral, Daily  PARoxetine, 30 mg, oral, Daily      Continuous medications     PRN medications  PRN medications: dextrose 10 % in water (D10W), dextrose, glucagon, meclizine, naloxone, ondansetron ODT **OR** ondansetron, oxyCODONE, oxyCODONE       Labs:  Results for orders placed or performed during the hospital encounter of 10/16/23 (from the past 24 hour(s))   CBC   Result Value Ref Range    WBC 7.4 4.4 - 11.3 x10*3/uL    nRBC 0.0 0.0 - 0.0 /100 WBCs    RBC 3.21 (L) 4.00 - 5.20 x10*6/uL    Hemoglobin 9.8 (L) 12.0 - 16.0 g/dL    Hematocrit 31.2 (L) 36.0 - 46.0 %    MCV 97 80 - 100 fL    MCH 30.5 26.0 - 34.0 pg    MCHC 31.4 (L) 32.0 - 36.0 g/dL    RDW 12.9 11.5 - 14.5 %    Platelets 343 150 - 450 x10*3/uL    MPV 10.8 7.5 - 11.5 fL   Comprehensive Metabolic Panel   Result Value Ref Range    Glucose 153 (H) 74 - 99 mg/dL    Sodium 134 (L) 136 - 145 mmol/L    Potassium 3.6 3.5 - 5.3 mmol/L    Chloride 99 98 - 107 mmol/L    Bicarbonate 29 21 - 32 mmol/L    Anion Gap 10 10 - 20 mmol/L    Urea Nitrogen 10 6 - 23 mg/dL    Creatinine 0.66 0.50 - 1.05 mg/dL    eGFR 80 >60 mL/min/1.73m*2    Calcium 9.1 8.6 - 10.3 mg/dL    Albumin 3.2 (L) 3.4 - 5.0 g/dL    Alkaline Phosphatase 55 33 - 136 U/L    Total Protein 5.5 (L) 6.4 - 8.2 g/dL    AST 18 9 - 39 U/L    Bilirubin, Total 0.4 0.0 - 1.2 mg/dL    ALT 11 7 - 45 U/L   Magnesium   Result Value Ref Range    Magnesium 1.59 (L) 1.60 - 2.40 mg/dL   Phosphorus   Result Value Ref Range    Phosphorus 2.7 2.5 - 4.9 mg/dL        Imaging:  No results found.     Plan    POD#1: s/p laparoscopic right danette-colectomy with side-to-side ileocolic anastomosis     #Malignant neoplasm of ascending colon  - surgery as above  - avss  - Diet: full liquids   - Pain control  - Nausea: antiemetics PRN  - Encourage OOB and IS  - DVT prophylaxis: heparin 5000u subcutaneous Q8  - Remove pyle catheter  - DC IVF 10/17    #Hypotension 2/2  possible dehydration   - IVF: LR 500cc bolus  - Continue to monitor  - H&H stable - no active signs of bleeding     #Hx of dizziness  - Antivert PRN resumed     #Anemia  - H&H 9.8/31.2 today  - No active signs of bleeding    #HLD  - Home atorvastatin resume      #HTN  - Home coreg and lisinopril resumed - parameters placed      #Hypothyroidism  - Home levothyroxine resumed     #Diabetes mellitus:  - Accu checks with sliding coverage     - Daily labs  - PT/OT consult      Plan of care discussed and patient seen with Dr. Pedro Luis Mendenhall APRN-CNP    I spent 15 minutes in the professional and overall care of this patient.     ADDENDUM:  Patient seen and examined with Miriam Mendenhall CNP this AM.  Agree with above.    Chirag Cloud MD

## 2023-10-19 ENCOUNTER — HOME HEALTH ADMISSION (OUTPATIENT)
Dept: HOME HEALTH SERVICES | Facility: HOME HEALTH | Age: 88
End: 2023-10-19
Payer: MEDICARE

## 2023-10-19 VITALS
SYSTOLIC BLOOD PRESSURE: 110 MMHG | TEMPERATURE: 97.7 F | DIASTOLIC BLOOD PRESSURE: 56 MMHG | RESPIRATION RATE: 16 BRPM | OXYGEN SATURATION: 94 % | WEIGHT: 119.93 LBS | HEIGHT: 66 IN | BODY MASS INDEX: 19.27 KG/M2 | HEART RATE: 91 BPM

## 2023-10-19 DIAGNOSIS — K63.89 MASS OF COLON: ICD-10-CM

## 2023-10-19 LAB
ALBUMIN SERPL BCP-MCNC: 3.1 G/DL (ref 3.4–5)
ALP SERPL-CCNC: 58 U/L (ref 33–136)
ALT SERPL W P-5'-P-CCNC: 10 U/L (ref 7–45)
ANION GAP SERPL CALC-SCNC: 11 MMOL/L (ref 10–20)
AST SERPL W P-5'-P-CCNC: 15 U/L (ref 9–39)
BILIRUB SERPL-MCNC: 0.3 MG/DL (ref 0–1.2)
BUN SERPL-MCNC: 7 MG/DL (ref 6–23)
CALCIUM SERPL-MCNC: 8.9 MG/DL (ref 8.6–10.3)
CHLORIDE SERPL-SCNC: 100 MMOL/L (ref 98–107)
CO2 SERPL-SCNC: 28 MMOL/L (ref 21–32)
CREAT SERPL-MCNC: 0.55 MG/DL (ref 0.5–1.05)
ERYTHROCYTE [DISTWIDTH] IN BLOOD BY AUTOMATED COUNT: 12.8 % (ref 11.5–14.5)
GFR SERPL CREATININE-BSD FRML MDRD: 83 ML/MIN/1.73M*2
GLUCOSE SERPL-MCNC: 161 MG/DL (ref 74–99)
HCT VFR BLD AUTO: 29.1 % (ref 36–46)
HGB BLD-MCNC: 9.2 G/DL (ref 12–16)
MAGNESIUM SERPL-MCNC: 1.65 MG/DL (ref 1.6–2.4)
MCH RBC QN AUTO: 30.1 PG (ref 26–34)
MCHC RBC AUTO-ENTMCNC: 31.6 G/DL (ref 32–36)
MCV RBC AUTO: 95 FL (ref 80–100)
NRBC BLD-RTO: 0 /100 WBCS (ref 0–0)
PHOSPHATE SERPL-MCNC: 2.3 MG/DL (ref 2.5–4.9)
PLATELET # BLD AUTO: 313 X10*3/UL (ref 150–450)
PMV BLD AUTO: 10.4 FL (ref 7.5–11.5)
POTASSIUM SERPL-SCNC: 3.5 MMOL/L (ref 3.5–5.3)
PROT SERPL-MCNC: 5.2 G/DL (ref 6.4–8.2)
RBC # BLD AUTO: 3.06 X10*6/UL (ref 4–5.2)
SODIUM SERPL-SCNC: 135 MMOL/L (ref 136–145)
WBC # BLD AUTO: 6 X10*3/UL (ref 4.4–11.3)

## 2023-10-19 PROCEDURE — 85027 COMPLETE CBC AUTOMATED: CPT | Performed by: NURSE PRACTITIONER

## 2023-10-19 PROCEDURE — 2500000002 HC RX 250 W HCPCS SELF ADMINISTERED DRUGS (ALT 637 FOR MEDICARE OP, ALT 636 FOR OP/ED): Performed by: STUDENT IN AN ORGANIZED HEALTH CARE EDUCATION/TRAINING PROGRAM

## 2023-10-19 PROCEDURE — 2500000001 HC RX 250 WO HCPCS SELF ADMINISTERED DRUGS (ALT 637 FOR MEDICARE OP): Performed by: STUDENT IN AN ORGANIZED HEALTH CARE EDUCATION/TRAINING PROGRAM

## 2023-10-19 PROCEDURE — 96372 THER/PROPH/DIAG INJ SC/IM: CPT | Performed by: STUDENT IN AN ORGANIZED HEALTH CARE EDUCATION/TRAINING PROGRAM

## 2023-10-19 PROCEDURE — 99238 HOSP IP/OBS DSCHRG MGMT 30/<: CPT | Performed by: NURSE PRACTITIONER

## 2023-10-19 PROCEDURE — 2500000004 HC RX 250 GENERAL PHARMACY W/ HCPCS (ALT 636 FOR OP/ED): Performed by: STUDENT IN AN ORGANIZED HEALTH CARE EDUCATION/TRAINING PROGRAM

## 2023-10-19 PROCEDURE — 97116 GAIT TRAINING THERAPY: CPT | Mod: GP

## 2023-10-19 PROCEDURE — 83735 ASSAY OF MAGNESIUM: CPT | Performed by: NURSE PRACTITIONER

## 2023-10-19 PROCEDURE — 84075 ASSAY ALKALINE PHOSPHATASE: CPT | Performed by: NURSE PRACTITIONER

## 2023-10-19 PROCEDURE — 36415 COLL VENOUS BLD VENIPUNCTURE: CPT | Performed by: NURSE PRACTITIONER

## 2023-10-19 PROCEDURE — 84100 ASSAY OF PHOSPHORUS: CPT | Performed by: NURSE PRACTITIONER

## 2023-10-19 RX ADMIN — INSULIN LISPRO 1 UNITS: 100 INJECTION, SOLUTION INTRAVENOUS; SUBCUTANEOUS at 09:20

## 2023-10-19 RX ADMIN — LEVOTHYROXINE SODIUM 112 MCG: 0.11 TABLET ORAL at 09:19

## 2023-10-19 RX ADMIN — PAROXETINE 30 MG: 20 TABLET, FILM COATED ORAL at 09:18

## 2023-10-19 RX ADMIN — INSULIN LISPRO 3 UNITS: 100 INJECTION, SOLUTION INTRAVENOUS; SUBCUTANEOUS at 12:57

## 2023-10-19 RX ADMIN — HEPARIN SODIUM 5000 UNITS: 5000 INJECTION INTRAVENOUS; SUBCUTANEOUS at 06:10

## 2023-10-19 ASSESSMENT — COGNITIVE AND FUNCTIONAL STATUS - GENERAL
MOVING FROM LYING ON BACK TO SITTING ON SIDE OF FLAT BED WITH BEDRAILS: A LITTLE
MOVING TO AND FROM BED TO CHAIR: A LITTLE
MOBILITY SCORE: 19
CLIMB 3 TO 5 STEPS WITH RAILING: A LOT
TURNING FROM BACK TO SIDE WHILE IN FLAT BAD: A LITTLE

## 2023-10-19 ASSESSMENT — PAIN - FUNCTIONAL ASSESSMENT: PAIN_FUNCTIONAL_ASSESSMENT: 0-10

## 2023-10-19 ASSESSMENT — PAIN SCALES - GENERAL: PAINLEVEL_OUTOF10: 0 - NO PAIN

## 2023-10-19 NOTE — PROGRESS NOTES
Lupis Jackson 89 y.o. female    Subjective  Patient seen and examined this morning.  Denies nausea and vomiting.  No fever or chills. Tolerating full liquid diet.  Pain controlled.  Urinating well. Up ambulating. No CP or SOB. Overall feels better today. No acute events overnight.    Objective    PHYSICAL EXAM:  Physical Exam  Vitals reviewed.   Constitutional:       General: She is awake.      Appearance: Normal appearance.   Cardiovascular:      Rate and Rhythm: Normal rate and regular rhythm.      Pulses: Normal pulses.      Heart sounds: Normal heart sounds.   Pulmonary:      Effort: Pulmonary effort is normal.      Breath sounds: Normal breath sounds and air entry.   Abdominal:      General: Abdomen is flat. There is no distension.      Palpations: Abdomen is soft.      Comments: Abdominal incision with staples intact, C/D/I.   Appropriately TTP.     Genitourinary:     Comments: No pyle catheter present.   Musculoskeletal:         General: Normal range of motion.      Cervical back: Normal range of motion.   Skin:     General: Skin is warm and dry.   Neurological:      General: No focal deficit present.      Mental Status: She is alert and oriented to person, place, and time.   Psychiatric:         Behavior: Behavior is cooperative.         Vital signs in last 24 hours:  Vitals:    10/19/23 0800   BP: 118/64   Pulse: 73   Resp: 16   Temp: 36.7 °C (98.1 °F)   SpO2: 94%         Intake/Output this shift:    Intake/Output Summary (Last 24 hours) at 10/19/2023 0904  Last data filed at 10/18/2023 2313  Gross per 24 hour   Intake 63 ml   Output 200 ml   Net -137 ml        Allergies:  No Known Allergies     Medications:  Scheduled medications  atorvastatin, 10 mg, oral, q PM  [Held by provider] carvedilol, 3.125 mg, oral, BID with meals  heparin (porcine), 5,000 Units, subcutaneous, q8h ASHELY  insulin lispro, 0-5 Units, subcutaneous, TID with meals  levothyroxine, 112 mcg, oral, Daily  lisinopril, 2.5 mg, oral,  Daily  PARoxetine, 30 mg, oral, Daily      Continuous medications     PRN medications  PRN medications: dextrose 10 % in water (D10W), dextrose, glucagon, meclizine, naloxone, ondansetron ODT **OR** ondansetron, oxyCODONE, oxyCODONE       Labs:  Results for orders placed or performed during the hospital encounter of 10/16/23 (from the past 24 hour(s))   CBC   Result Value Ref Range    WBC 6.0 4.4 - 11.3 x10*3/uL    nRBC 0.0 0.0 - 0.0 /100 WBCs    RBC 3.06 (L) 4.00 - 5.20 x10*6/uL    Hemoglobin 9.2 (L) 12.0 - 16.0 g/dL    Hematocrit 29.1 (L) 36.0 - 46.0 %    MCV 95 80 - 100 fL    MCH 30.1 26.0 - 34.0 pg    MCHC 31.6 (L) 32.0 - 36.0 g/dL    RDW 12.8 11.5 - 14.5 %    Platelets 313 150 - 450 x10*3/uL    MPV 10.4 7.5 - 11.5 fL   Comprehensive Metabolic Panel   Result Value Ref Range    Glucose 161 (H) 74 - 99 mg/dL    Sodium 135 (L) 136 - 145 mmol/L    Potassium 3.5 3.5 - 5.3 mmol/L    Chloride 100 98 - 107 mmol/L    Bicarbonate 28 21 - 32 mmol/L    Anion Gap 11 10 - 20 mmol/L    Urea Nitrogen 7 6 - 23 mg/dL    Creatinine 0.55 0.50 - 1.05 mg/dL    eGFR 83 >60 mL/min/1.73m*2    Calcium 8.9 8.6 - 10.3 mg/dL    Albumin 3.1 (L) 3.4 - 5.0 g/dL    Alkaline Phosphatase 58 33 - 136 U/L    Total Protein 5.2 (L) 6.4 - 8.2 g/dL    AST 15 9 - 39 U/L    Bilirubin, Total 0.3 0.0 - 1.2 mg/dL    ALT 10 7 - 45 U/L   Magnesium   Result Value Ref Range    Magnesium 1.65 1.60 - 2.40 mg/dL   Phosphorus   Result Value Ref Range    Phosphorus 2.3 (L) 2.5 - 4.9 mg/dL        Imaging:  No results found.     Plan  POD#3 s/p laparoscopic right danette-colectomy with side-to-side ileocolic anastomosis     #Malignant neoplasm of ascending colon  - surgery as above  - avss  - Diet: GIS/low fiber  - Pain control  - Nausea: antiemetics PRN  - Encourage OOB and IS  - DVT prophylaxis: heparin 5000u subcutaneous Q8  - Remove pyle catheter  - DC IVF 10/17     #Hypotension 2/2 possible dehydration - resolved  - Continue to hold coreg - will hold upon  discharge  - Continue to monitor  - H&H stable - no active signs of bleeding      #Hx of dizziness  - Antivert PRN resumed      #Anemia  - H&H 9.2/29.1 today  - No active signs of bleeding     #HLD  - Home atorvastatin resume      #HTN  - Home coreg (hold) and lisinopril resumed - parameters placed      #Hypothyroidism  - Home levothyroxine resumed     #Diabetes mellitus:  - Accu checks with sliding coverage     - Daily labs  - PT/OT consult     Plan of care discussed and patient seen with Dr. Cloud and patient may discharge if tolerates diet. Patient to follow up in 2 weeks.  Home medications and prescriptions as discussed.  Recommend patient follow up with her cardiologist.     GIAN Clarke-CNP    I spent 30 minutes in the professional and overall care of this patient.

## 2023-10-19 NOTE — CONSULTS
"Assessment Subjective/Objective:   Note Type: Education   Reason for Assessment: auto referral for CORS  Note Authored by: Registered Dietitian Nutritionist   Pager Number: [phone] secure chat     Nutrition Note:  Lupis Jackson is a 89 y.o. female admitted to [facility] after right hemicolectomy with ileocolic anastomosis.  Pt had likely malignant tumor found during colonoscopy in June.  The colonoscopy had been completed due to pt having diarrhea.     10/19: Pt advanced to low fiber diet today, per case management pt ready for discharge.  Pt seen sleeping at this time, no family present.    Past Medical History   has a past medical history of Breast cancer (CMS/HCC), Cataract, Colorectal cancer (CMS/HCC), Coronary artery disease, Dizziness, Essential (primary) hypertension (04/07/2022), HL (hearing loss), Hyperlipidemia, unspecified (07/26/2021), Hypothyroidism, Myocardial infarction (CMS/HCC), Presence of coronary angioplasty implant and graft (07/27/2020), Type 2 diabetes mellitus without complications (CMS/[facility]) (12/15/2022), and Vitamin D deficiency, unspecified (04/22/2019).  Surgical History   has a past surgical history that includes Other surgical history (12/10/2018); Colonoscopy; and Cataract extraction.    Objective Information:    Pain: Pain Assessment: 0-10  Pain Score: 0 - No pain    Vitals: Blood pressure 110/56, pulse 91, temperature 36.5 °C (97.7 °F), temperature source Temporal, resp. rate 16, height 1.676 m (5' 6\"), weight 54.4 kg (119 lb 14.9 oz), SpO2 94 %.    Height/Weight:  Height: 167.6cm   Weight (kg): 58.9kg   BMI (kg/m2): 21   Weight history/ % weight change:   9/20/23 121#  8/23/23 123.25#  6/16/23 124#  10/13/22 128#  Significant Weight Loss: no, weight stable over the past year per archives.     Recent Lab Results:  Lab Results   Component Value Date    GLUCOSE 161 (H) 10/19/2023    CALCIUM 8.9 10/19/2023     (L) 10/19/2023    K 3.5 10/19/2023    CO2 28 10/19/2023     10/19/2023    BUN 7 " 10/19/2023    CREATININE 0.55 10/19/2023       Medications:  Scheduled medications  atorvastatin, 10 mg, oral, q PM  [Held by provider] carvedilol, 3.125 mg, oral, BID with meals  heparin (porcine), 5,000 Units, subcutaneous, q8h ASHELY  insulin lispro, 0-5 Units, subcutaneous, TID with meals  levothyroxine, 112 mcg, oral, Daily  lisinopril, 2.5 mg, oral, Daily  PARoxetine, 30 mg, oral, Daily      Nutrition Orders:  Dietary Orders (From admission, onward)       Start     Ordered    10/19/23 0832  Adult diet Low fiber  Diet effective now        Question:  Diet type  Answer:  Low fiber    10/19/23 0831    10/17/23 1229  Oral nutritional supplements  Until discontinued        Question Answer Comment   Deliver with All meals    Select supplement: Ensure Clear        10/17/23 1228                     Food/Nutrition Related History:  Energy Intake:  (Clear liquid diet)  Food and Nutrient History: Pt hard of hearing, family present.  Pt denies any GI discomfort, no nausea/vomiting.  She has been taking in small amounts of clear liquids without issue.  Family agreeable to addition of Ensure clear while on clear liquids and changing to Ensure HP once at least full liquids.  Pt was consuming Ensure at home once daily and likes vanilla or chocolate.  She normally eats three meals daily and denies any food intolerances/allergies.    Oral Problems: denies any difficulty   Mobility: difficulty with normal activity     Nutrition Diagnosis:   Patient has Nutrition Diagnosis: Yes  Nutrition Diagnosis 1: Altered GI function  Diagnosis Status (1): New  Related to (1): malignant neoplasm of ascending colon  As Evidenced by (1): pt requiring surgical intervention now s/p right hemicolectomy with ileocolic anastamosis.    Nutrition Goals:  Less than 5 days NPO/clear liquids, oral intake >50%, oral intake, provided oral supplement, adequate PO fluid intakes, lab values within normal limits, electrolytes within normal limits   Nutrition Goal  Outcomes: Will assess progress towards goals upon follow-up.     Nutrition Therapy Interventions/Recommendations:   Recommendations:   Diet: continue GI soft  Oral Nutrition Supplements:  recommend Ensure HP TID with meals (160kcal, 16g pro)  Coordination of care: nursing     Dietitian Monitoring and Evaluation Plan:  Monitoring and Evaluation Plan: PO intake/tolerance, fluid intake/adequacy, weight trend, skin healing/integrity, overall appearance, meal behavior, digestive function,  labs     Follow Up  Time Spent (min): 30 minutes  Last Date of Nutrition Visit: 10/19/23  Nutrition Follow-Up Needed?: Dietitian to reassess per policy

## 2023-10-19 NOTE — CARE PLAN
The patient's goals for the shift include      The clinical goals for the shift include pt will be comfortable during the shift

## 2023-10-19 NOTE — PROGRESS NOTES
Patient and POA would like home care. [facility]. Message sent to Dr Cloud and CNP for home care orders.   1203 MD ordered [facility] PT/OT. Ready for discharge.    Sarika Dior RN

## 2023-10-19 NOTE — PROGRESS NOTES
Occupational Therapy    Occupational Therapy    Evaluation/Treatment    Patient Name: Lupis Jackson  MRN: [id number]  : 3/14/1926  Today's Date: 10/19/23  Time Calculation  Start Time: 1223  Stop Time: 1241  Time Calculation (min): 18 min       Assessment:  OT Assessment: Pt pleasant and cooperative. Pt is close to baseline level of function and may benefit from [facility] OT at discharge  Prognosis: Good  End of Session Communication: Bedside nurse  End of Session Patient Position: Up in chair, Alarm on  OT Assessment Results: Decreased ADL status, Decreased endurance, Decreased functional mobility  Prognosis: Good    Plan:  Treatment Interventions: ADL retraining, Neuromuscular reeducation, UE strengthening/ROM, Endurance training  OT Frequency: 5 times per week  Treatment Interventions: ADL retraining, Neuromuscular reeducation, UE strengthening/ROM, Endurance training  Subjective     Current Problem:  1. Malignant neoplasm of ascending colon (CMS/HCC)  Surgical Pathology Exam    Surgical Pathology Exam        Past Medical History:   Diagnosis Date    Breast cancer (CMS/HCC)     Cataract     Colorectal cancer (CMS/HCC)     Coronary artery disease     Dizziness     Essential (primary) hypertension 2022    Essential hypertension    HL (hearing loss)     Hyperlipidemia, unspecified 2021    Hyperlipidemia    Hypothyroidism     Myocardial infarction (CMS/HCC)     Presence of coronary angioplasty implant and graft 2020    History of coronary artery stent placement    Type 2 diabetes mellitus without complications (CMS/HCC) 12/15/2022    Diabetes mellitus    Vitamin D deficiency, unspecified 2019    Vitamin D deficiency     Past Surgical History:   Procedure Laterality Date    CATARACT EXTRACTION      COLONOSCOPY      OTHER SURGICAL HISTORY  12/10/2018    Right mastectomy       General:   OT Received On: 10/17/23  General  Reason for Referral: abdominal surgery  Referred By: Chirag Parekh MD  Past Medical  History Relevant to Rehab:  (lap. colectomy)  Family/Caregiver Present: Yes (cousin present)  Co-Treatment: PT  Co-Treatment Reason:  (safety)  Prior to Session Communication: Bedside nurse  Patient Position Received: Bed, 3 rail up, Alarm on      HPI     Lupis is a 89 yo female who underwent a colonoscopy with Dr. Whittington on 6/20/23. She was found to have a likely malignant tumor in the proximal ascending colon. Path demonstrating invasive poorly differentiated adenocarcinoma. PMS-2 and MLH-1 absent. Colonoscopy was completed for diarrhea.      She presents today to discuss surgery that is scheduled for next week. Unfortunately her son in law passed away.      CEA 8/23/23: 2.3     CT chest 8/31/23: Colon carcinoma restaging. No metastatic disease to the lungs. Prominent right hilar lymph node.     CT a/p 7/30/23: Mild wall thickening of the distal descending and sigmoid colon, concerning for mild colitis. There is slight urothelial thickening/enhancement which is nonspecific, though may be seen with urinary tract infection/inflammation. Please correlate with urinalysis. Colonic diverticulosis without evidence of diverticulitis. Mild intrahepatic and extrahepatic biliary ductal dilation unchanged from 11/02/2022, likely secondary to prior cholecystectomy. 0.8 cm enhancing lymph node adjacent to the sigmoid colon not substantially changed from 11/02/2022, uncertain clinical significance.     Colonoscopy 6/20/23 (Pk): Likely malignant tumor in the proximal ascending colon. Biopsied. 5cms distal to the cecum. Diverticulosis in the sigmoid colon. External hemorrhoids. The sigmoid colon, descending colon and transverse colon are normal. Biopsied. The examination was otherwise normal on direct and retroflexion views. Path demonstrating invasive poorly differentiated adenocarcinoma. Positive for MLH1 promoter methylation.           Precautions:  Medical Precautions: Fall precautions  Post-Surgical Precautions: Abdominal  surgery precautions  Precautions Comment:  (bed/chair alarm)    Pain:  Pain Assessment  Pain Assessment: 0-10  Pain Score: 0 - No pain (Pt did not ahve any complaints of pain throughout eval)    Objective   Cognition:  Overall Cognitive Status: Within Functional Limits    Home Living:  Pt lives home alone in a split level house. Pt has 2 SENG (no rail), 8 stairs (with rail) up to the 2nd floor and 4 stairs (with rail) down to the lower level. Pt's 2nd floor has a full bathroom that has a tub/shower with a grab bar and access to a shower chair. Pt has a 1/2 bath on the lower level. Laundry is also located on this level.   Pt son-in-law was living with pt until his recent passing. Pt's nephew has plans to move in with pt. During that time, pt's cousin has been staying with pt.   Pt was independent with ADL's, and most IADL's. Pt was completing most household tasks.   Pt was not using any AE to ambulated and has not had any recent falls.     ADL:  Eating Assistance: Independent  Grooming Assistance: Independent  Bathing Assistance: Minimal  UE Dressing Assistance: Minimal  LE Dressing Assistance: Minimal  Toileting Assistance with Device: Minimal    Activity Tolerance:  Endurance: Endurance does not limit participation in activity    Bed Mobility/Transfers:   Supine-sit: SBA  Sit-stand: CGA  Stand-sit: CGA    Balance:   Sit: good  Static stand: fair+  Dynamic stand: fair    Sensation:  Sensation Comment: Pt did not have any complaints of sensation deficits    Extremities: RUE   RUE : Within Functional Limits and LUE   LUE: Within Functional Limits    Outcome Measures: [facility] Daily Activity  Putting on and taking off regular lower body clothing: A little  Bathing (including washing, rinsing, drying): A little  Putting on and taking off regular upper body clothing: A little  Toileting, which includes using toilet, bedpan or urinal: A little  Taking care of personal grooming such as brushing teeth: None  Eating Meals:  None  Daily Activity - Total Score: 20    EDUCATION:  Education-pt educated on abdominal precautions   Individual(s) Educated: Patient  Patient Response to Education: Patient/Caregiver Verbalized Understanding of Information    Goals:  Encounter Problems       Encounter Problems (Active)       OT Goals       OT Goal 1 (Progressing)       Start:  10/18/23    Expected End:  11/01/23       Pt will complete all bed mobility independently and safely           OT Goal 2 (Progressing)       Start:  10/18/23    Expected End:  11/01/23       Pt will complete ADL's and mobility with good sit balance and good stand balance           OT Goal 3 (Progressing)       Start:  10/18/23    Expected End:  11/01/23       Pt with complete all transfers safely with modified independence           OT Goal 4 (Progressing)       Start:  10/18/23    Expected End:  11/01/23       Pt will complete UB dressing ADL's with independence, and LB dressing ADL's with SBA using AE as needed          OT Goal 5 (Progressing)       Start:  10/18/23    Expected End:  11/01/23       Pt with complete grooming ADL's with independence and good stand balance              Treatment: Pt was able to complete bed mobility with supervision. Pt with good sit balance on EOB. Pt completed all transfers with CGA. Pt ambulated in room/bull with CGA using wheeled walker. Pt stood at sink with fair+ stand balance to complete grooming ADL's. Pt returned to and remained in bedside chair with chair alarm on and call light within reach.

## 2023-10-19 NOTE — DISCHARGE INSTRUCTIONS
CONTINUE TO HOLD COREG UNTIL FOLLOW UP APPOINTMENT WITH CARDIOLOGIST     Call Dr. Cloud for any problems and/or concerns.  *Continue soft diet/ low fiber until follow up appointment   *Avoid heavy lifting/straining/strenuous activity - nothing over 10 pounds   *Ambulation encouraged  *Continue the coughing and deep breathing exercises that you learned in the hospital  *No driving while taking narcotics or if unable to operate vehicle how you did prior to surgery    Call Doctor right away for:  *Fever of 100.4 or above/shaking chills  *Persistent nausea/vomiting  *Increased belly pain  *Increased redness, swelling, drainage, or pain near incision  *Trouble controlling bowel/bladder  *Bloody stool or black, tarry stool  *Vomiting blood  *Inability to urinate/move bowels  *Chest pain/shortness of breath-call [phone]

## 2023-10-19 NOTE — DISCHARGE SUMMARY
Discharge Diagnosis  Malignant neoplasm of ascending colon (CMS/HCC)  Hypophosphatemia  Hypomagnesemia  Hypotension  Hypertension    Issues Requiring Follow-Up  Post-op wound check    Test Results Pending At Discharge  Pending Labs       Order Current Status    Surgical Pathology Exam In process            Hospital Course  Patient was taken to the operating room on day of admission for laparoscopic right danette-colectomy.  Patient tolerated the surgery well and post-operatively was taken to the regular nursing floor.  Patient was initiated on ERAS protocol with low rate of intravenous fluids, multi-modal pain management regimen, clear liquid diet, and DVT chemoprophylaxis.  Home medications for underlying history of hypertension were resumed.  Patient's post-operative course was notable for hypotension which was treated with intravenous fluid bolus.  Home coreg was subsequently held.  Patient was also noted to have hypomagnesemia and hypophosphatemia which were repleted.  Patient's diet was advanced and bowel function returned on post-operative day one.  On day of discharge, patient had achieved adequate pain control, full return of bowel function, tolerated diet advancement and was able to both void and ambulate independently.  Hypotension had resolved.  She was deemed appropriate for discharge and thus discharged to home with instructions for wound care, pain control, diet, activity, and follow-up.  She was discharged to home with home PT/OT.    Pertinent Physical Exam At Time of Discharge  Physical Exam  General:  A&Ox3, NAD.  Neuro:  No focal deficits.  Respiratory:  No respiratory distress.  Breathing comfortably.  CV:  Extremities warm and well-perfused, no RAUDEL.  GI:  Soft, non-distended, appropriately TTP, incision(s) C/D/I w/ staples.    :  Gibson catheter absent      Home Medications     Medication List      CONTINUE taking these medications     aspirin 81 mg EC tablet   atorvastatin 10 mg tablet; Commonly  known as: Lipitor   cholecalciferol 50 MCG (2000 UT) tablet; Commonly known as: Vitamin D-3   Lantus U-100 Insulin 100 unit/mL injection; Generic drug: insulin   glargine   levothyroxine 112 mcg tablet; Commonly known as: Synthroid, Levoxyl;   TAKE ONE TABLET BY MOUTH DAILY   lisinopril 2.5 mg tablet   meclizine 12.5 mg tablet; Commonly known as: Antivert   One-A-Day Women's Complete(vK) 18 mg-400 mcg- 25 mcg tablet; Generic   drug: mv-calcium-min-iron fm-FA-vitK   PARoxetine 20 mg tablet; Commonly known as: Paxil; take 1 and 1/2   tablets by mouth daily.   Tradjenta 5 mg tablet; Generic drug: linaGLIPtin     STOP taking these medications     carvedilol 3.125 mg tablet; Commonly known as: Coreg   chlorhexidine 0.12 % solution; Commonly known as: Peridex   metroNIDAZOLE 250 mg tablet; Commonly known as: FlagyL   neomycin 500 mg tablet; Commonly known as: Mycifradin       Outpatient Follow-Up  Future Appointments   Date Time Provider Department Center   6/21/2024  9:30 AM Andres Andrews MD [id number] [place]   8/29/2024 11:30 AM Jade Varela MD [id number] [place]       Chirag Cloud MD

## 2023-10-19 NOTE — PROGRESS NOTES
Physical Therapy    Physical Therapy Treatment    Patient Name: Lupis Jackson  MRN: [id number]  Today's Date: 10/19/2023  Time Calculation  Start Time: 0926  Stop Time: 0942  Time Calculation (min): 16 min       Assessment/Plan     PT Plan  Inpatient/Swing Bed or Outpatient: Inpatient  PT Plan  Treatment/Interventions: Bed mobility, Transfer training, Gait training, Balance training, Neuromuscular re-education, Strengthening, Endurance training, Range of motion, Therapeutic exercise, Therapeutic activity, Home exercise program, Stair training  PT Plan: Skilled PT  PT Frequency: Daily  PT Discharge Recommendations: Low intensity level of continued care  PT Recommended Transfer Status: Assist x1      General Visit Information:   PT  Visit  PT Received On: 10/19/23  Response to Previous Treatment: Patient with no complaints from previous session.       Subjective   Precautions:  Precautions  Hearing/Visual Limitations: [place]  Medical Precautions: Fall precautions  Post-Surgical Precautions: Abdominal surgery precautions  Vital Signs:       Objective   Pain:  Pain Assessment  Pain Assessment: 0-10  Pain Score: 0 - No pain  Cognition:     Postural Control:     Extremity/Trunk Assessments:    Activity Tolerance:     Treatments:                 Ambulation/Gait Training  Ambulation/Gait Training Performed: Yes  Ambulation/Gait Training 1  Surface 1: Level tile  Device 1: Rolling walker  Gait Support Devices: Gait belt  Assistance 1: Close supervision  Quality of Gait 1: Narrow base of support (Patient demos kyphotic posture, and decreased foot clearance. Otherwise grossly steady with wheeled wlaker)  Comments/Distance (ft) 1: 250  Transfers  Transfer: Yes  Transfer 1  Transfer From 1: Commode-standard to  Transfer Device 1: Walker  Transfer Level of Assistance 1: Distant supervision  Transfers 2  Transfer From 2: Sit to  Transfer to 2: Stand  Transfer Device 2: Walker  Transfer Level of Assistance 2: Close supervision    Outcome  Measures:  [facility] Basic Mobility  Turning from your back to your side while in a flat bed without using bedrails: A little  Moving from lying on your back to sitting on the side of a flat bed without using bedrails: A little  Moving to and from bed to chair (including a wheelchair): A little  Standing up from a chair using your arms (e.g. wheelchair or bedside chair): None  To walk in hospital room: None  Climbing 3-5 steps with railing: A lot  Basic Mobility - Total Score: 19    Education Documentation  Precautions, taught by Sun Grajeda PTA at 10/19/2023 10:15 AM.  Learner: Patient  Readiness: Acceptance  Method: Explanation, Demonstration  Response: Demonstrated Understanding, Verbalizes Understanding    Body Mechanics, taught by Sun Grajeda PTA at 10/19/2023 10:15 AM.  Learner: Patient  Readiness: Acceptance  Method: Explanation, Demonstration  Response: Demonstrated Understanding, Verbalizes Understanding    Mobility Training, taught by Sun Grajeda PTA at 10/19/2023 10:15 AM.  Learner: Patient  Readiness: Acceptance  Method: Explanation, Demonstration  Response: Demonstrated Understanding, Verbalizes Understanding    Body Mechanics, taught by Sun Grajeda PTA at 10/19/2023 10:15 AM.  Learner: Patient  Readiness: Acceptance  Method: Explanation, Demonstration  Response: Demonstrated Understanding, Verbalizes Understanding    Precautions, taught by Sun Grajeda PTA at 10/19/2023 10:15 AM.  Learner: Patient  Readiness: Acceptance  Method: Explanation, Demonstration  Response: Demonstrated Understanding, Verbalizes Understanding    Precautions, taught by Sun Grajeda PTA at 10/19/2023 10:15 AM.  Learner: Patient  Readiness: Eager  Method: Explanation  Response: Verbalizes Understanding, Demonstrated Understanding    Body Mechanics, taught by Sun Grajeda PTA at 10/19/2023 10:15 AM.  Learner: Patient  Readiness: Eager  Method: Explanation  Response: Verbalizes Understanding,  Demonstrated Understanding    Mobility Training, taught by Sun Grajeda PTA at 10/19/2023 10:15 AM.  Learner: Patient  Readiness: Eager  Method: Explanation  Response: Verbalizes Understanding, Demonstrated Understanding    Body Mechanics, taught by Sun Grajeda PTA at 10/19/2023 10:15 AM.  Learner: Patient  Readiness: Eager  Method: Explanation  Response: Verbalizes Understanding, Demonstrated Understanding    Precautions, taught by Sun Grajeda PTA at 10/19/2023 10:15 AM.  Learner: Patient  Readiness: Eager  Method: Explanation  Response: Verbalizes Understanding, Demonstrated Understanding    Education Comments  No comments found.        OP EDUCATION:       Encounter Problems       Encounter Problems (Active)       PT Problem       Pt will progress to completing 3 x 20 supine/seated exercises in order to increase strength and improve gait mechanics.   (Progressing)       Start:  10/18/23    Expected End:  11/01/23            Pt will complete sit <> stand and bed <> chair transfers with mod I.  (Progressing)       Start:  10/18/23    Expected End:  11/01/23            Pt will ambulate 300 feet mod I using FWW with no significant gait deviations.   (Progressing)       Start:  10/18/23    Expected End:  11/01/23            Pt will ascend/descend at least 8 stairs using rail in order to navigate home environment.   (Progressing)       Start:  10/18/23    Expected End:  11/01/23

## 2023-10-19 NOTE — NURSING NOTE
Discharge instructions given to patient and family who verbalized understanding.  Education given on low fiber diet  Teach back regarding incision care  Saline lock X2  dc'd intact and pressure drsg applied  Patient discharged via wheelchair to home with family in [place]

## 2023-10-23 ENCOUNTER — HOME CARE VISIT (OUTPATIENT)
Dept: HOME HEALTH SERVICES | Facility: HOME HEALTH | Age: 88
End: 2023-10-23
Payer: MEDICARE

## 2023-10-23 VITALS
TEMPERATURE: 97.7 F | DIASTOLIC BLOOD PRESSURE: 60 MMHG | HEART RATE: 54 BPM | HEIGHT: 66 IN | SYSTOLIC BLOOD PRESSURE: 110 MMHG | BODY MASS INDEX: 19.93 KG/M2 | WEIGHT: 124 LBS

## 2023-10-23 PROCEDURE — 1090000001 HH PPS REVENUE CREDIT

## 2023-10-23 PROCEDURE — 0023 HH SOC

## 2023-10-23 PROCEDURE — G0151 HHCP-SERV OF PT,EA 15 MIN: HCPCS | Mod: HHH

## 2023-10-23 PROCEDURE — 1090000002 HH PPS REVENUE DEBIT

## 2023-10-23 PROCEDURE — 169592 NO-PAY CLAIM PROCEDURE

## 2023-10-23 SDOH — ECONOMIC STABILITY: HOUSING INSECURITY: HOME SAFETY: BATH AND BEDROOM ON SECOND FLOOR  SPLIT LEVEL HOME

## 2023-10-23 SDOH — HEALTH STABILITY: PHYSICAL HEALTH: EXERCISE COMMENTS: DEFER

## 2023-10-23 ASSESSMENT — BALANCE ASSESSMENTS
STANDING ON ONE LEG: 1
TURN 360 DEGREES: 2 - ABLE TO TURN 360 DEGREES SAFELY BUT SLOWLY
STANDING TO SITTING: 3 - CONTROLS DESCENT BY USING HANDS
STANDING TO SITTING: 3
TRANSFERS: 4 - ABLE TO TRANSFER SAFELY WITH MINOR USE OF HANDS
STANDING UNSUPPORTED ONE FOOT IN FRONT: 1 - NEEDS HELP TO STEP BUT CAN HOLD 15 SECONDS
LONG VERSION TOTAL SCORE (MAX 56): 40
SITTING TO STANDING: 3 - ABLE TO STAND INDEPENDENTLY USING HANDS
TRANSFERS: 4
STANDING UNSUPPORTED WITH EYES CLOSED: 3
STANDING ON ONE LEG: 1 - TRIES TO LIFT LEG UNABLE TO HOLD 3 SECONDS BUT REMAINS STANDING INDEPENDENTLY
STANDING UNSUPPORTED: 4
STANDING UNSUPPORTED WITH FEET TOGETHER: 3
REACHING FORWARD WITH OUTSTRETCHED ARM WHILE STANDING: 3 - CAN REACH FORWARD 12 CM (5 INCHES)
STANDING UNSUPPORTED WITH EYES CLOSED: 3 - ABLE TO STAND 10 SECONDS WITH SUPERVISION
STANDING UNSUPPORTED ONE FOOT IN FRONT: 1
STANDING UNSUPPORTED WITH FEET TOGETHER: 3 - ABLE TO PLACE FEET TOGETHER INDEPENDENTLY AND STAND 1 MINUTE WITH SUPERVISION
SITTING TO STANDING: 3
PICK UP OBJECT FROM THE FLOOR FROM A STANDING POSITION: 3 - ABLE TO PICK UP SLIPPER BUT NEEDS SUPERVISION
TURN 360 DEGREES: 2

## 2023-10-23 ASSESSMENT — ENCOUNTER SYMPTOMS
OCCASIONAL FEELINGS OF UNSTEADINESS: 0
DENIES PAIN: 1

## 2023-10-23 ASSESSMENT — ACTIVITIES OF DAILY LIVING (ADL)
AMBULATION ASSISTANCE: 1
AMBULATION_DISTANCE/DURATION_TOLERATED: 150
ENTERING_EXITING_HOME: STAND BY ASSIST
AMBULATION ASSISTANCE ON FLAT SURFACES: 1
AMBULATION ASSISTANCE: INDEPENDENT
CURRENT_FUNCTION: INDEPENDENT
PHYSICAL TRANSFERS ASSESSED: 1

## 2023-10-24 ENCOUNTER — TELEPHONE (OUTPATIENT)
Dept: ENDOCRINOLOGY | Facility: CLINIC | Age: 88
End: 2023-10-24
Payer: MEDICARE

## 2023-10-24 PROCEDURE — 1090000002 HH PPS REVENUE DEBIT

## 2023-10-24 PROCEDURE — 1090000001 HH PPS REVENUE CREDIT

## 2023-10-24 NOTE — TELEPHONE ENCOUNTER
Lupis Jackson called and wanted you to know she had an operation 10-16-23 and  her sugars are running alittle higher.  She is on Lantus 6 units in the  Evening.  She takes her sugar every morning and instead of being 120-130   It has been 150-170.  Should she make any changes?  Please send to Pranav or Tenisha to call her back.  The number she is at is [phone]

## 2023-10-25 ENCOUNTER — HOME CARE VISIT (OUTPATIENT)
Dept: HOME HEALTH SERVICES | Facility: HOME HEALTH | Age: 88
End: 2023-10-25
Payer: MEDICARE

## 2023-10-25 ENCOUNTER — TUMOR BOARD CONFERENCE (OUTPATIENT)
Dept: HEMATOLOGY/ONCOLOGY | Facility: HOSPITAL | Age: 88
End: 2023-10-25
Payer: MEDICARE

## 2023-10-25 PROCEDURE — 1090000002 HH PPS REVENUE DEBIT

## 2023-10-25 PROCEDURE — 1090000001 HH PPS REVENUE CREDIT

## 2023-10-25 PROCEDURE — G0152 HHCP-SERV OF OT,EA 15 MIN: HCPCS | Mod: HHH

## 2023-10-25 SDOH — ECONOMIC STABILITY: HOUSING INSECURITY
HOME SAFETY: PT LIVES WITH NEPHEW, TRI LEVEL HOME. BED/BATH UPPER LEVEL WITH WALK IN SHOWER. LOWER LEVEL HAS BATHROOM ALSO

## 2023-10-25 ASSESSMENT — ACTIVITIES OF DAILY LIVING (ADL)
PREPARING MEALS: INDEPENDENT
DRESSING_LB_CURRENT_FUNCTION: INDEPENDENT

## 2023-10-25 ASSESSMENT — ENCOUNTER SYMPTOMS
PERSON REPORTING PAIN: PATIENT
DENIES PAIN: 1

## 2023-10-26 DIAGNOSIS — K63.89 MASS OF COLON: ICD-10-CM

## 2023-10-26 PROCEDURE — 1090000002 HH PPS REVENUE DEBIT

## 2023-10-26 PROCEDURE — 1090000001 HH PPS REVENUE CREDIT

## 2023-10-27 ENCOUNTER — HOME CARE VISIT (OUTPATIENT)
Dept: HOME HEALTH SERVICES | Facility: HOME HEALTH | Age: 88
End: 2023-10-27
Payer: MEDICARE

## 2023-10-27 ENCOUNTER — OFFICE VISIT (OUTPATIENT)
Dept: CARDIOLOGY | Facility: CLINIC | Age: 88
End: 2023-10-27
Payer: MEDICARE

## 2023-10-27 VITALS
BODY MASS INDEX: 18.48 KG/M2 | DIASTOLIC BLOOD PRESSURE: 52 MMHG | SYSTOLIC BLOOD PRESSURE: 90 MMHG | HEART RATE: 84 BPM | WEIGHT: 115 LBS | HEIGHT: 66 IN

## 2023-10-27 DIAGNOSIS — D50.0 IRON DEFICIENCY ANEMIA DUE TO CHRONIC BLOOD LOSS: ICD-10-CM

## 2023-10-27 DIAGNOSIS — E08.22 DIABETES MELLITUS DUE TO UNDERLYING CONDITION WITH STAGE 1 CHRONIC KIDNEY DISEASE, UNSPECIFIED WHETHER LONG TERM INSULIN USE (MULTI): ICD-10-CM

## 2023-10-27 DIAGNOSIS — N18.1 DIABETES MELLITUS DUE TO UNDERLYING CONDITION WITH STAGE 1 CHRONIC KIDNEY DISEASE, UNSPECIFIED WHETHER LONG TERM INSULIN USE (MULTI): ICD-10-CM

## 2023-10-27 DIAGNOSIS — I25.10 CORONARY ARTERY DISEASE INVOLVING NATIVE CORONARY ARTERY OF NATIVE HEART WITHOUT ANGINA PECTORIS: Primary | ICD-10-CM

## 2023-10-27 DIAGNOSIS — E78.49 OTHER HYPERLIPIDEMIA: ICD-10-CM

## 2023-10-27 DIAGNOSIS — Z95.5 HISTORY OF CORONARY ARTERY STENT PLACEMENT: ICD-10-CM

## 2023-10-27 PROBLEM — I10 ESSENTIAL HYPERTENSION: Status: RESOLVED | Noted: 2023-10-09 | Resolved: 2023-10-27

## 2023-10-27 PROCEDURE — 1159F MED LIST DOCD IN RCRD: CPT | Performed by: INTERNAL MEDICINE

## 2023-10-27 PROCEDURE — 1111F DSCHRG MED/CURRENT MED MERGE: CPT | Performed by: INTERNAL MEDICINE

## 2023-10-27 PROCEDURE — 1126F AMNT PAIN NOTED NONE PRSNT: CPT | Performed by: INTERNAL MEDICINE

## 2023-10-27 PROCEDURE — 1160F RVW MEDS BY RX/DR IN RCRD: CPT | Performed by: INTERNAL MEDICINE

## 2023-10-27 PROCEDURE — 1090000002 HH PPS REVENUE DEBIT

## 2023-10-27 PROCEDURE — 99214 OFFICE O/P EST MOD 30 MIN: CPT | Performed by: INTERNAL MEDICINE

## 2023-10-27 PROCEDURE — 1036F TOBACCO NON-USER: CPT | Performed by: INTERNAL MEDICINE

## 2023-10-27 PROCEDURE — 1090000001 HH PPS REVENUE CREDIT

## 2023-10-27 NOTE — PATIENT INSTRUCTIONS

## 2023-10-27 NOTE — PROGRESS NOTES
"Chief Complaint:   Please see below.     History Of Present Illness:    Lupis Jackson is a 89 y.o. female presenting with CAD, hypertension.    This diabetic woman has a history of CAD dating back to January 2015, at which time she sustained an MI and went on to have QUINTIN x 3 of the proximal and mid LAD. She had a 50% stenosis in the RCA and a 70% stenosis in the distal LAD that were not stented. Her EF at that time was 45%.     She underwent right hemicolectomy for colon cancer on October 16, 2023.  Her blood pressures have been low, and she has had lightheaded episodes if she stands up quickly.  She checks her glucose every morning, and her glucoses have been averaging 170 in the mornings.  She does not have a PCP, and lives with her nephew.      Since the last visit here, the patient denies chest pain, shortness of breath, palpitations, orthopnea, PND, syncope.      Last Recorded Vitals:  Vitals:    10/27/23 1041   BP: 90/52   BP Location: Left arm   Patient Position: Sitting   BP Cuff Size: Adult   Pulse: 84   Weight: 52.2 kg (115 lb)   Height: 1.676 m (5' 6\")       Past Medical History:  She has a past medical history of Breast cancer (CMS/[facility]), Cataract, Colorectal cancer (CMS/[facility]), Coronary artery disease, Dizziness, Essential (primary) hypertension (04/07/2022), HL (hearing loss), Hyperlipidemia, unspecified (07/26/2021), Hypothyroidism, Myocardial infarction (CMS/[facility]), Presence of coronary angioplasty implant and graft (07/27/2020), Type 2 diabetes mellitus without complications (CMS/[facility]) (12/15/2022), and Vitamin D deficiency, unspecified (04/22/2019).    Past Surgical History:  She has a past surgical history that includes Other surgical history (12/10/2018); Colonoscopy; and Cataract extraction.      Social History:  She reports that she quit smoking about 47 years ago. Her smoking use included cigarettes. She has never used smokeless tobacco. She reports that she does not drink alcohol and does not use " drugs.    Family History:  Family History   Problem Relation Name Age of Onset    No Known Problems Mother      No Known Problems Father          Allergies:  Patient has no known allergies.    Outpatient Medications:  Current Outpatient Medications   Medication Instructions    aspirin 81 mg EC tablet 1 tablet, oral, Daily    atorvastatin (LIPITOR) 10 mg, oral, Every evening    cholecalciferol (Vitamin D-3) 50 MCG (2000 UT) tablet 1 tablet, oral, Daily    insulin glargine (LANTUS U-100 INSULIN) 8 Units, subcutaneous, Nightly    levothyroxine (Synthroid, Levoxyl) 112 mcg tablet TAKE ONE TABLET BY MOUTH DAILY    meclizine (ANTIVERT) 12.5 mg, oral, Every 12 hours PRN    mv-calcium-min-iron fm-FA-vitK (One-A-Day Women's Complete,vK,) 18 mg-400 mcg- 25 mcg tablet 1 tablet, oral, Daily    PARoxetine (PAXIL) 30 mg, oral, Daily, Take 1 and 1/2 (one and one-half) tablets by mouth daily.       Physical Exam:  GENERAL:  pleasant 89 year-old  HEENT: No xanthelasma  NECK: Supple, no palpable adenopathy or thyromegaly  CHEST: Clear to auscultation, respiratory effort unlabored  CARDIAC: RRR, normal S1 and S2, no audible murmur, rub, gallop, carotids are brisk, PMI is not displaced  ABD: Active bowel sounds, nontender, no organomegaly, no evidence of ascites  EXT: No clubbing, cyanosis, edema, or tenderness  NEURO: Awake, alert, appropriate, speech is fluent       Last Labs:  CBC -  Lab Results   Component Value Date    WBC 6.0 10/19/2023    HGB 9.2 (L) 10/19/2023    HCT 29.1 (L) 10/19/2023    MCV 95 10/19/2023     10/19/2023       CMP -  Lab Results   Component Value Date    CALCIUM 8.9 10/19/2023    PHOS 2.3 (L) 10/19/2023    PROT 5.2 (L) 10/19/2023    ALBUMIN 3.1 (L) 10/19/2023    AST 15 10/19/2023    ALT 10 10/19/2023    ALKPHOS 58 10/19/2023    BILITOT 0.3 10/19/2023       LIPID PANEL -   Lab Results   Component Value Date    CHOL 136 02/02/2023    TRIG 163 (H) 04/07/2021    HDL 47.8 02/02/2023    CHHDL 2.8 02/02/2023     LDLF 52 04/07/2021    VLDL 33 04/07/2021    NHDL 104 06/03/2019       RENAL FUNCTION PANEL -   Lab Results   Component Value Date    GLUCOSE 161 (H) 10/19/2023     (L) 10/19/2023    K 3.5 10/19/2023     10/19/2023    CO2 28 10/19/2023    ANIONGAP 11 10/19/2023    BUN 7 10/19/2023    CREATININE 0.55 10/19/2023    CALCIUM 8.9 10/19/2023    PHOS 2.3 (L) 10/19/2023    ALBUMIN 3.1 (L) 10/19/2023        Lab Results   Component Value Date    HGBA1C 6.9 04/07/2021         Lab review: I have Chemistry CMP:   Lab Results   Component Value Date    ALBUMIN 3.1 (L) 10/19/2023    CALCIUM 8.9 10/19/2023    CO2 28 10/19/2023    CREATININE 0.55 10/19/2023    GLUCOSE 161 (H) 10/19/2023    BILITOT 0.3 10/19/2023    PROT 5.2 (L) 10/19/2023    ALT 10 10/19/2023    AST 15 10/19/2023    ALKPHOS 58 10/19/2023   , Chemistry BMP   Lab Results   Component Value Date    GLUCOSE 161 (H) 10/19/2023    CALCIUM 8.9 10/19/2023    CO2 28 10/19/2023    CREATININE 0.55 10/19/2023   , and CBC:  Lab Results   Component Value Date    WBC 6.0 10/19/2023    RBC 3.06 (L) 10/19/2023    HGB 9.2 (L) 10/19/2023    HCT 29.1 (L) 10/19/2023    MCV 95 10/19/2023    MCH 30.1 10/19/2023    MCHC 31.6 (L) 10/19/2023    RDW 12.8 10/19/2023    MPV 10.4 10/19/2023    NRBC 0.0 10/19/2023       Assessment/Plan   Problem List Items Addressed This Visit          Cardiac and Vasculature    CAD (coronary artery disease) - Primary    Relevant Orders    Follow Up In Cardiology    History of coronary artery stent placement    Hyperlipidemia       Endocrine/Metabolic    Diabetes mellitus (CMS/HCC)    Relevant Orders    Referral to Primary Care     Other Visit Diagnoses       Iron deficiency anemia due to chronic blood loss        Relevant Orders    Referral to Primary Care            Andres Andrews MD

## 2023-10-28 PROCEDURE — 1090000002 HH PPS REVENUE DEBIT

## 2023-10-28 PROCEDURE — 1090000001 HH PPS REVENUE CREDIT

## 2023-10-29 PROCEDURE — 1090000002 HH PPS REVENUE DEBIT

## 2023-10-29 PROCEDURE — 1090000001 HH PPS REVENUE CREDIT

## 2023-10-30 ENCOUNTER — HOME CARE VISIT (OUTPATIENT)
Dept: HOME HEALTH SERVICES | Facility: HOME HEALTH | Age: 88
End: 2023-10-30
Payer: MEDICARE

## 2023-10-30 VITALS — TEMPERATURE: 97.3 F | SYSTOLIC BLOOD PRESSURE: 106 MMHG | HEART RATE: 72 BPM | DIASTOLIC BLOOD PRESSURE: 60 MMHG

## 2023-10-30 LAB
LAB AP ASR DISCLAIMER: NORMAL
LABORATORY COMMENT REPORT: NORMAL
PATH REPORT.FINAL DX SPEC: NORMAL
PATH REPORT.GROSS SPEC: NORMAL
PATH REPORT.RELEVANT HX SPEC: NORMAL
PATH REPORT.TOTAL CANCER: NORMAL
PATHOLOGY SYNOPTIC REPORT: NORMAL

## 2023-10-30 PROCEDURE — 1090000001 HH PPS REVENUE CREDIT

## 2023-10-30 PROCEDURE — G0151 HHCP-SERV OF PT,EA 15 MIN: HCPCS | Mod: HHH

## 2023-10-30 PROCEDURE — 1090000002 HH PPS REVENUE DEBIT

## 2023-10-30 SDOH — ECONOMIC STABILITY: HOUSING INSECURITY: HOME SAFETY: SPLIT LEVEL

## 2023-10-30 SDOH — HEALTH STABILITY: PHYSICAL HEALTH: EXERCISE COMMENTS: DEFERRED

## 2023-10-30 ASSESSMENT — ACTIVITIES OF DAILY LIVING (ADL)
AMBULATION ASSISTANCE: 1
PHYSICAL TRANSFERS ASSESSED: 1

## 2023-10-30 ASSESSMENT — ENCOUNTER SYMPTOMS
LOWEST PAIN SEVERITY IN PAST 24 HOURS: 0/10
VOMITING: 1
PAIN: 1
PAIN SEVERITY GOAL: 0/10
NAUSEA: 1
HIGHEST PAIN SEVERITY IN PAST 24 HOURS: 6/10
PERSON REPORTING PAIN: PATIENT
PAIN LOCATION - PAIN QUALITY: SHARP
PAIN LOCATION: ABDOMEN
PAIN LOCATION - PAIN SEVERITY: 6/10

## 2023-10-30 NOTE — HOME HEALTH
Pt reported nausea and vomittng this morning after drinking ensure.  Also noted increased redness around wound.  Picture sent to Becky LOCK and pt will be seeing her on Wednesday at 1130 now.  Care coordinated with patients CG for transportation, information

## 2023-10-31 ENCOUNTER — OFFICE VISIT (OUTPATIENT)
Dept: PRIMARY CARE | Facility: CLINIC | Age: 88
End: 2023-10-31
Payer: MEDICARE

## 2023-10-31 VITALS
HEIGHT: 66 IN | BODY MASS INDEX: 18.54 KG/M2 | TEMPERATURE: 98.6 F | WEIGHT: 115.4 LBS | DIASTOLIC BLOOD PRESSURE: 69 MMHG | SYSTOLIC BLOOD PRESSURE: 121 MMHG | HEART RATE: 69 BPM

## 2023-10-31 DIAGNOSIS — R42 DIZZINESS: Primary | ICD-10-CM

## 2023-10-31 DIAGNOSIS — D64.9 ANEMIA, UNSPECIFIED TYPE: ICD-10-CM

## 2023-10-31 DIAGNOSIS — H91.90 HEARING LOSS, UNSPECIFIED HEARING LOSS TYPE, UNSPECIFIED LATERALITY: ICD-10-CM

## 2023-10-31 DIAGNOSIS — E78.1 ESSENTIAL HYPERTRIGLYCERIDEMIA: ICD-10-CM

## 2023-10-31 DIAGNOSIS — R35.0 INCREASED URINARY FREQUENCY: ICD-10-CM

## 2023-10-31 DIAGNOSIS — E13.69 OTHER SPECIFIED DIABETES MELLITUS WITH OTHER SPECIFIED COMPLICATION, WITH LONG-TERM CURRENT USE OF INSULIN (MULTI): Primary | ICD-10-CM

## 2023-10-31 DIAGNOSIS — C18.2 MALIGNANT NEOPLASM OF ASCENDING COLON (MULTI): ICD-10-CM

## 2023-10-31 DIAGNOSIS — Z79.4 OTHER SPECIFIED DIABETES MELLITUS WITH OTHER SPECIFIED COMPLICATION, WITH LONG-TERM CURRENT USE OF INSULIN (MULTI): Primary | ICD-10-CM

## 2023-10-31 DIAGNOSIS — E46 PROTEIN-CALORIE MALNUTRITION, UNSPECIFIED SEVERITY (MULTI): ICD-10-CM

## 2023-10-31 DIAGNOSIS — I25.10 CORONARY ARTERY DISEASE INVOLVING NATIVE CORONARY ARTERY OF NATIVE HEART WITHOUT ANGINA PECTORIS: ICD-10-CM

## 2023-10-31 DIAGNOSIS — E03.9 ADULT HYPOTHYROIDISM: ICD-10-CM

## 2023-10-31 DIAGNOSIS — E78.2 MIXED HYPERLIPIDEMIA: ICD-10-CM

## 2023-10-31 PROCEDURE — 99214 OFFICE O/P EST MOD 30 MIN: CPT | Performed by: INTERNAL MEDICINE

## 2023-10-31 PROCEDURE — 1090000001 HH PPS REVENUE CREDIT

## 2023-10-31 PROCEDURE — 1090000002 HH PPS REVENUE DEBIT

## 2023-10-31 PROCEDURE — 1111F DSCHRG MED/CURRENT MED MERGE: CPT | Performed by: INTERNAL MEDICINE

## 2023-10-31 PROCEDURE — 1159F MED LIST DOCD IN RCRD: CPT | Performed by: INTERNAL MEDICINE

## 2023-10-31 PROCEDURE — 1160F RVW MEDS BY RX/DR IN RCRD: CPT | Performed by: INTERNAL MEDICINE

## 2023-10-31 PROCEDURE — 1126F AMNT PAIN NOTED NONE PRSNT: CPT | Performed by: INTERNAL MEDICINE

## 2023-10-31 PROCEDURE — 1036F TOBACCO NON-USER: CPT | Performed by: INTERNAL MEDICINE

## 2023-10-31 RX ORDER — FERROUS GLUCONATE 256(28)MG
28 TABLET ORAL DAILY
Qty: 30 TABLET | Refills: 2 | Status: SHIPPED | OUTPATIENT
Start: 2023-10-31 | End: 2024-01-29

## 2023-10-31 NOTE — PROGRESS NOTES
Assessment/Plan   This is a 89 years old female who is independent with her nephew came for a follow-up visit.  She has had recent hospital stay.  Her condition was reviewed in tumor board on 10/25/2023 but the decision has not been made as per my review.  Patient is waiting to see the surgeon on follow-up.  At this time patient needs to follow-up with oncologist or surgeon for further evaluation and management options.    Patient's left lower quadrant discomfort seems to be postoperative and without any acute complication.  She has good bowel sounds and soft abdomen.  No evidence of infection.  Patient understands if there is fever diarrhea or significant abdominal pain she should call the surgeon or go to the ER.  She will keep the follow-up appointment with the surgeon.    Patient has history of coronary artery disease but she is stable.  She will continue to follow-up with her cardiologist.    Patient has anemia which is probably postoperative.  I advised the patient to start taking oral iron and her blood test will be followed up.    Patient has mild protein calorie malnutrition and the dietary changes were reviewed and discussed.    Fall prevention was discussed in detail and patient agrees to use the cane.    Patient will be followed up in 4 to 6 weeks time with a blood test as ordered.      Problem List Items Addressed This Visit       Malignant neoplasm of ascending colon (CMS/HCC)    Adult hypothyroidism    CAD (coronary artery disease) - Primary    Diabetes mellitus (CMS/HCC)    Essential hypertriglyceridemia    Relevant Orders    CBC and Auto Differential    Comprehensive Metabolic Panel    Hearing loss    Hyperlipidemia    Relevant Orders    CBC and Auto Differential    Comprehensive Metabolic Panel    Increased urinary frequency    Protein-calorie malnutrition, unspecified severity (CMS/HCC)     Other Visit Diagnoses       Anemia, unspecified type        Relevant Medications    ferrous gluconate 256 mg  (28 mg iron) tablet            Subjective     Patient ID: Lupis Jackson is a 89 y.o. female who presents for Establish Care.    History of present illness  This is a 89 years old female who is a  and also had 1 daughter who passed away about 2 years ago.  Patient came from out of town to take care of her daughter and then her son-in-law also passed away.  Patient lives with her nephew.  Patient does not want her nephew to be in the room more for me to discuss the care with the nephew at this time.    Patient states that she has been doing well but she had some abdominal problems including was investigated in the ER for gastroenteritis and then she had a colonoscopy done and found to have colon mass and she was admitted to the hospital for laparoscopic colon surgery.  She recovered and she is now staying home and having soft diet.    She has intermittent left lower abdominal discomfort but not bad.  She says it is only about 1-2 out of 10 rarely goes to 4-5 out of 10.  She has no fever no chills.  She denies any diarrhea.  She will be seeing surgeon in 2 days for a follow-up and staples removal.    I have reviewed the hospital records, discharge medications, all the pertinent labs, imaging studies, procedures associated with the hospital stay and have summarized the patient's care to incorporate into today's patient's clinical management.    Patient is known to have diabetes and on insulin.  She was seeing an endocrinologist but she says she is probably not going to see the endocrinologist.  She is very comfortable with insulin she has had no symptoms of hypoglycemia.    She is being careful not to fall down.    She is getting with physical therapy at home.  ROS  Patient denies any other significant complaints on review of systems.      Family History   Problem Relation Name Age of Onset    No Known Problems Mother      No Known Problems Father        Social History     Socioeconomic History    Marital status:       Spouse name: Not on file    Number of children: Not on file    Years of education: Not on file    Highest education level: Not on file   Occupational History    Not on file   Tobacco Use    Smoking status: Former     Types: Cigarettes     Quit date:      Years since quittin.8    Smokeless tobacco: Never   Vaping Use    Vaping Use: Never used   Substance and Sexual Activity    Alcohol use: Never    Drug use: Never    Sexual activity: Defer   Other Topics Concern    Not on file   Social History Narrative    Not on file     Social Determinants of Health     Financial Resource Strain: Low Risk  (10/16/2023)    Overall Financial Resource Strain (CARDIA)     Difficulty of Paying Living Expenses: Not hard at all   Food Insecurity: Not on file   Transportation Needs: No Transportation Needs (10/23/2023)    OASIS : Transportation     Lack of Transportation (Medical): No     Lack of Transportation (Non-Medical): No     Patient Unable or Declines to Respond: No   Physical Activity: Not on file   Stress: Not on file   Social Connections: Feeling Socially Integrated (10/23/2023)    OASIS : Social Isolation     Frequency of experiencing loneliness or isolation: Never   Intimate Partner Violence: Not on file   Housing Stability: Low Risk  (10/16/2023)    Housing Stability Vital Sign     Unable to Pay for Housing in the Last Year: No     Number of Places Lived in the Last Year: 1     Unstable Housing in the Last Year: No      Patient has no known allergies.   Current Outpatient Medications   Medication Sig Dispense Refill    aspirin 81 mg EC tablet Take 1 tablet (81 mg) by mouth once daily.      atorvastatin (Lipitor) 10 mg tablet Take 1 tablet (10 mg) by mouth once daily in the evening.      cholecalciferol (Vitamin D-3) 50 MCG (2000 UT) tablet Take 1 tablet (2,000 Units) by mouth once daily.      insulin glargine (Lantus U-100 Insulin) 100 unit/mL injection Inject 8 Units under the skin once daily at  bedtime.      levothyroxine (Synthroid, Levoxyl) 112 mcg tablet TAKE ONE TABLET BY MOUTH DAILY 90 tablet 3    meclizine (Antivert) 12.5 mg tablet Take 1 tablet (12.5 mg) by mouth every 12 hours if needed.      mv-calcium-min-iron fm-FA-vitK (One-A-Day Women's Complete,vK,) 18 mg-400 mcg- 25 mcg tablet Take 1 tablet by mouth once daily.      PARoxetine (Paxil) 20 mg tablet take 1 and 1/2 tablets by mouth daily. 135 tablet 0    ferrous gluconate 256 mg (28 mg iron) tablet Take 1 tablet (28 mg) by mouth once daily. 30 tablet 2     No current facility-administered medications for this visit.        Objective     Vitals:    10/31/23 1512   BP: 121/69   Pulse: 69   Temp: 37 °C (98.6 °F)        Physical Exam  Thin built pleasant elderly female, well-nourished  with no apparent distress. Alert oriented  Skin:  Normal turgor.  No rash.  Head:  Normocephalic, atraumatic.  Eyes:  Pupils are equal, round,.  No pallor of conjunctivae.  Mouth has moist oral mucosa.  Pharynx appears normal.  No erythema.  Neck:  Supple.  No JVD.  No carotid bruit.  No thyromegaly. No cervical lymphadenopathy.  No clubbing  Chest:  Vesicular breathing Bilaterally moderate air entry and bilaterally clear to auscultation.  No wheezing.  No crackles.  Heart:  Regular rate with intermittent ectopics and rhythm.  S1, S2 positive.  No murmur.  Abdomen:  Soft, slight discomfort in the left lower quadrant but no redness.  No signs of peritonitis, has staples in the laparoscopic scar tissue.  Underlying skin has well-healed.  Bowel sounds are positive.  No organomegaly.  Extremities:  Bilaterally no pedal pitting edema.  Bilaterally 2+ dorsalis pedis pulses.  No calf tenderness. Homans sign is negative.  Neuro Exam: No focal signs. Gait is normal.      Problem List Items Addressed This Visit       Malignant neoplasm of ascending colon (CMS/HCC)    Adult hypothyroidism    CAD (coronary artery disease) - Primary    Diabetes mellitus (CMS/HCC)    Essential  hypertriglyceridemia    Relevant Orders    CBC and Auto Differential    Comprehensive Metabolic Panel    Hearing loss    Hyperlipidemia    Relevant Orders    CBC and Auto Differential    Comprehensive Metabolic Panel    Increased urinary frequency    Protein-calorie malnutrition, unspecified severity (CMS/HCC)     Other Visit Diagnoses       Anemia, unspecified type        Relevant Medications    ferrous gluconate 256 mg (28 mg iron) tablet             Orders Placed This Encounter   Procedures    CBC and Auto Differential     Standing Status:   Future     Standing Expiration Date:   10/31/2024     Order Specific Question:   This patient has an active home care or hospice episode. Should this order be routed to home care or hospice?     Answer:   No     Order Specific Question:   [facility]     Answer:   Immediate    Comprehensive Metabolic Panel     Standing Status:   Future     Standing Expiration Date:   10/31/2024     Order Specific Question:   This patient has an active home care or hospice episode. Should this order be routed to home care or hospice?     Answer:   No     Order Specific Question:   Release result to [facility]     Answer:   Immediate        Lab Results   Component Value Date    WBC 6.0 10/19/2023    HGB 9.2 (L) 10/19/2023    HCT 29.1 (L) 10/19/2023     10/19/2023    CHOL 136 02/02/2023    TRIG 163 (H) 04/07/2021    HDL 47.8 02/02/2023    ALT 10 10/19/2023    AST 15 10/19/2023     (L) 10/19/2023    K 3.5 10/19/2023     10/19/2023    CREATININE 0.55 10/19/2023    BUN 7 10/19/2023    CO2 28 10/19/2023    TSH 2.44 02/02/2023    INR 0.9 10/16/2023    HGBA1C 6.9 04/07/2021     Lab Results   Component Value Date    CHOL 136 02/02/2023    CHHDL 2.8 02/02/2023       No results found.

## 2023-11-01 ENCOUNTER — APPOINTMENT (OUTPATIENT)
Dept: SURGERY | Facility: CLINIC | Age: 88
End: 2023-11-01
Payer: MEDICARE

## 2023-11-01 ENCOUNTER — TUMOR BOARD CONFERENCE (OUTPATIENT)
Dept: HEMATOLOGY/ONCOLOGY | Facility: HOSPITAL | Age: 88
End: 2023-11-01
Payer: MEDICARE

## 2023-11-01 PROCEDURE — 1090000002 HH PPS REVENUE DEBIT

## 2023-11-01 PROCEDURE — 1090000001 HH PPS REVENUE CREDIT

## 2023-11-01 NOTE — TUMOR BOARD NOTE
#Ascending colon adenocarcinoma, invasive poorly differentiated, MLH-1 and PMS-2 deficient . S/p right hemicolectomy.  Pathology reviewed-  Right colon, terminal ileum, and appendix, right hemicolectomy:  - Poorly-differentiated adenocarcinoma of the colon, see synoptic report. pT2pN0.  - Lymphovascular (small vessel) invasion present.  - Resection margins are negative for neoplasia.  - Twenty-four lymph nodes negative for carcinoma (0/24).  - Segment of terminal ileum with no significant pathologic findings.  - Appendix with sessile serrated lesion.    Plan: Surveillance.

## 2023-11-02 PROCEDURE — 1090000001 HH PPS REVENUE CREDIT

## 2023-11-02 PROCEDURE — 1090000002 HH PPS REVENUE DEBIT

## 2023-11-03 ENCOUNTER — APPOINTMENT (OUTPATIENT)
Dept: LAB | Facility: LAB | Age: 88
End: 2023-11-03
Payer: MEDICARE

## 2023-11-03 ENCOUNTER — OFFICE VISIT (OUTPATIENT)
Dept: SURGERY | Facility: CLINIC | Age: 88
End: 2023-11-03
Payer: MEDICARE

## 2023-11-03 ENCOUNTER — APPOINTMENT (OUTPATIENT)
Dept: SURGERY | Facility: CLINIC | Age: 88
End: 2023-11-03
Payer: MEDICARE

## 2023-11-03 VITALS
SYSTOLIC BLOOD PRESSURE: 96 MMHG | WEIGHT: 115 LBS | TEMPERATURE: 97.2 F | DIASTOLIC BLOOD PRESSURE: 59 MMHG | BODY MASS INDEX: 18.56 KG/M2 | HEART RATE: 105 BPM

## 2023-11-03 DIAGNOSIS — C18.2 MALIGNANT NEOPLASM OF ASCENDING COLON (MULTI): Primary | ICD-10-CM

## 2023-11-03 PROCEDURE — 1036F TOBACCO NON-USER: CPT | Performed by: NURSE PRACTITIONER

## 2023-11-03 PROCEDURE — 1090000002 HH PPS REVENUE DEBIT

## 2023-11-03 PROCEDURE — 1111F DSCHRG MED/CURRENT MED MERGE: CPT | Performed by: NURSE PRACTITIONER

## 2023-11-03 PROCEDURE — 1160F RVW MEDS BY RX/DR IN RCRD: CPT | Performed by: NURSE PRACTITIONER

## 2023-11-03 PROCEDURE — 1159F MED LIST DOCD IN RCRD: CPT | Performed by: NURSE PRACTITIONER

## 2023-11-03 PROCEDURE — 1126F AMNT PAIN NOTED NONE PRSNT: CPT | Performed by: NURSE PRACTITIONER

## 2023-11-03 PROCEDURE — 99024 POSTOP FOLLOW-UP VISIT: CPT | Performed by: NURSE PRACTITIONER

## 2023-11-03 PROCEDURE — 1090000001 HH PPS REVENUE CREDIT

## 2023-11-03 ASSESSMENT — ENCOUNTER SYMPTOMS
LIGHT-HEADEDNESS: 0
NAUSEA: 0
CONFUSION: 0
DIAPHORESIS: 0
APPETITE CHANGE: 0
COUGH: 0
DIZZINESS: 0
WHEEZING: 0
POLYDIPSIA: 0
AGITATION: 0
CONSTIPATION: 0
NUMBNESS: 0
OCCASIONAL FEELINGS OF UNSTEADINESS: 1
BLOOD IN STOOL: 0
HEMATURIA: 0
UNEXPECTED WEIGHT CHANGE: 0
DYSURIA: 0
FEVER: 0
ADENOPATHY: 0
VOMITING: 0
DEPRESSION: 0
WEAKNESS: 0
DIFFICULTY URINATING: 0
FREQUENCY: 0
SHORTNESS OF BREATH: 0
ACTIVITY CHANGE: 0
NERVOUS/ANXIOUS: 0
ANAL BLEEDING: 0
SLEEP DISTURBANCE: 0
ABDOMINAL PAIN: 0
SEIZURES: 0
TREMORS: 0
CHEST TIGHTNESS: 0
ABDOMINAL DISTENTION: 0
PALPITATIONS: 0
RECTAL PAIN: 0
BRUISES/BLEEDS EASILY: 0
CHILLS: 0
HEADACHES: 0
DIARRHEA: 0
POLYPHAGIA: 0
COLOR CHANGE: 0
APNEA: 0

## 2023-11-03 NOTE — PROGRESS NOTES
Chief Compliant:  POV      History Of Present Illness  Lupis Jackson is a 89 y.o. female here for follow up after surgery.     S/p right colectomy for ascending colon cancer on 10/16/23.    LUQ pain radiating to back. Using aspercreme    Denies any F/C, N/V, CP/SOB, dysuria.   Appetite: good  Energy: good  Weight: down a few lbs   BM: 1-2 daily. fomred      Review of Systems   Constitutional:  Negative for activity change, appetite change, chills, diaphoresis, fever and unexpected weight change.   Respiratory:  Negative for apnea, cough, chest tightness, shortness of breath and wheezing.    Cardiovascular:  Negative for chest pain, palpitations and leg swelling.   Gastrointestinal:  Negative for abdominal distention, abdominal pain, anal bleeding, blood in stool, constipation, diarrhea, nausea, rectal pain and vomiting.   Endocrine: Negative for cold intolerance, heat intolerance, polydipsia, polyphagia and polyuria.   Genitourinary:  Negative for difficulty urinating, dysuria, frequency and hematuria.   Musculoskeletal:         Left upper quadrant pain radiating to the back   Skin:  Negative for color change.   Neurological:  Negative for dizziness, tremors, seizures, syncope, weakness, light-headedness, numbness and headaches.   Hematological:  Negative for adenopathy. Does not bruise/bleed easily.   Psychiatric/Behavioral:  Negative for agitation, confusion, self-injury, sleep disturbance and suicidal ideas. The patient is not nervous/anxious.         Physical Exam  Constitutional:       Appearance: Normal appearance.   HENT:      Head: Normocephalic.   Neck:      Vascular: No carotid bruit.   Cardiovascular:      Rate and Rhythm: Normal rate and regular rhythm.      Pulses: Normal pulses.      Heart sounds: Normal heart sounds.   Pulmonary:      Effort: Pulmonary effort is normal.      Breath sounds: Normal breath sounds.   Abdominal:      Comments: Soft, NT, ND. Incision is clean and dry. Staples removed in the usual  fashion. Steri strips applied.   Musculoskeletal:      Cervical back: No tenderness.   Lymphadenopathy:      Cervical: No cervical adenopathy.   Neurological:      General: No focal deficit present.      Mental Status: She is alert and oriented to person, place, and time.   Psychiatric:         Mood and Affect: Mood normal.         Behavior: Behavior normal.         Last Recorded Vitals  Blood pressure 96/59, pulse 105, temperature 36.2 °C (97.2 °F), weight 52.2 kg (115 lb).    Assessment:  Ascending colon cancer  S/p right colectomy T2N0 +LVI  Doing well post op    Plan:  -Diet: Continue soft diet for another 2 weeks  -Activity: Normal activities can be resumed. No lifting greater than 10 lbs for a full 6 weeks following surgery   -Pathology was reviewed with the patient. The follow up protocol for colon cancer was outlined for the patient. These recommendations are based on the [facility] Practice Parameters.   -Office visit and CEA every 3-6 months for 2 years and then every 6 months until 5 years  -CT C/A/P every year for 5 years  -Colonoscopy 1 year after diagnosis and subsequent ones determined upon findings.  -Follow up with me in 2 weeks or sooner if any problems  All questions and concerns were addressed with patient.      Arely Maria, GIAN-CNP

## 2023-11-04 PROCEDURE — 1090000001 HH PPS REVENUE CREDIT

## 2023-11-04 PROCEDURE — 1090000002 HH PPS REVENUE DEBIT

## 2023-11-05 PROCEDURE — 1090000001 HH PPS REVENUE CREDIT

## 2023-11-05 PROCEDURE — 1090000002 HH PPS REVENUE DEBIT

## 2023-11-05 RX ORDER — MECLIZINE HCL 12.5 MG 12.5 MG/1
12.5 TABLET ORAL EVERY 12 HOURS PRN
Qty: 40 TABLET | Refills: 0 | Status: SHIPPED | OUTPATIENT
Start: 2023-11-05

## 2023-11-06 ENCOUNTER — HOME CARE VISIT (OUTPATIENT)
Dept: HOME HEALTH SERVICES | Facility: HOME HEALTH | Age: 88
End: 2023-11-06
Payer: MEDICARE

## 2023-11-06 VITALS — HEART RATE: 72 BPM | DIASTOLIC BLOOD PRESSURE: 72 MMHG | TEMPERATURE: 96.7 F | SYSTOLIC BLOOD PRESSURE: 115 MMHG

## 2023-11-06 PROCEDURE — 1090000001 HH PPS REVENUE CREDIT

## 2023-11-06 PROCEDURE — G0151 HHCP-SERV OF PT,EA 15 MIN: HCPCS | Mod: HHH

## 2023-11-06 PROCEDURE — 1090000002 HH PPS REVENUE DEBIT

## 2023-11-06 SDOH — HEALTH STABILITY: PHYSICAL HEALTH: EXERCISE COMMENTS: INSTR IN POSTURAL EXERCISES  SEATED AG LE EXERCISES

## 2023-11-06 ASSESSMENT — ENCOUNTER SYMPTOMS
PAIN LOCATION: ABDOMEN
PAIN LOCATION - PAIN SEVERITY: 7/10
LOWEST PAIN SEVERITY IN PAST 24 HOURS: 7/10
SUBJECTIVE PAIN PROGRESSION: WAXING AND WANING
HIGHEST PAIN SEVERITY IN PAST 24 HOURS: 9/10
PAIN SEVERITY GOAL: 2/10

## 2023-11-06 ASSESSMENT — ACTIVITIES OF DAILY LIVING (ADL)
CURRENT_FUNCTION: INDEPENDENT
AMBULATION ASSISTANCE: 1
AMBULATION ASSISTANCE ON FLAT SURFACES: 1
AMBULATION ASSISTANCE: INDEPENDENT
PHYSICAL TRANSFERS ASSESSED: 1
AMBULATION_DISTANCE/DURATION_TOLERATED: 50 FT X 2

## 2023-11-07 PROCEDURE — 1090000002 HH PPS REVENUE DEBIT

## 2023-11-07 PROCEDURE — 1090000001 HH PPS REVENUE CREDIT

## 2023-11-07 ASSESSMENT — ACTIVITIES OF DAILY LIVING (ADL): OASIS_M1830: 05

## 2023-11-08 PROCEDURE — 1090000001 HH PPS REVENUE CREDIT

## 2023-11-08 PROCEDURE — 1090000002 HH PPS REVENUE DEBIT

## 2023-11-09 PROCEDURE — G0180 MD CERTIFICATION HHA PATIENT: HCPCS | Performed by: STUDENT IN AN ORGANIZED HEALTH CARE EDUCATION/TRAINING PROGRAM

## 2023-11-09 PROCEDURE — 1090000002 HH PPS REVENUE DEBIT

## 2023-11-09 PROCEDURE — 1090000001 HH PPS REVENUE CREDIT

## 2023-11-10 PROCEDURE — 1090000001 HH PPS REVENUE CREDIT

## 2023-11-10 PROCEDURE — 1090000002 HH PPS REVENUE DEBIT

## 2023-11-11 PROCEDURE — 1090000002 HH PPS REVENUE DEBIT

## 2023-11-11 PROCEDURE — 1090000001 HH PPS REVENUE CREDIT

## 2023-11-12 PROCEDURE — 1090000001 HH PPS REVENUE CREDIT

## 2023-11-12 PROCEDURE — 1090000002 HH PPS REVENUE DEBIT

## 2023-11-13 ENCOUNTER — HOME CARE VISIT (OUTPATIENT)
Dept: HOME HEALTH SERVICES | Facility: HOME HEALTH | Age: 88
End: 2023-11-13
Payer: MEDICARE

## 2023-11-13 VITALS — HEART RATE: 60 BPM | SYSTOLIC BLOOD PRESSURE: 114 MMHG | DIASTOLIC BLOOD PRESSURE: 70 MMHG | TEMPERATURE: 97.3 F

## 2023-11-13 PROCEDURE — 1090000002 HH PPS REVENUE DEBIT

## 2023-11-13 PROCEDURE — G0151 HHCP-SERV OF PT,EA 15 MIN: HCPCS | Mod: HHH

## 2023-11-13 PROCEDURE — 1090000001 HH PPS REVENUE CREDIT

## 2023-11-13 SDOH — HEALTH STABILITY: PHYSICAL HEALTH
EXERCISE COMMENTS: PT FOCUSES ON WALKING AND FUNCTIONAL ACTIVITIES, DENIES NEED FOR HEP AS SHE IS ALONE MOST OF THE DAYAND TAKES CARE OF HERSELF.  SHE LIVES IN A SPLIT LEVEL HOME AND IS ABLE RO NEGOTIATE STEPS AT WILL WITHOUT DIFFICULTY

## 2023-11-13 SDOH — ECONOMIC STABILITY: HOUSING INSECURITY: HOME SAFETY: SPLIT LEVEL

## 2023-11-13 ASSESSMENT — ACTIVITIES OF DAILY LIVING (ADL)
OASIS_M1830: 01
AMBULATION_DISTANCE/DURATION_TOLERATED: 150 X 2
AMBULATION ASSISTANCE: 1
AMBULATION ASSISTANCE: INDEPENDENT
PHYSICAL TRANSFERS ASSESSED: 1
HOME_HEALTH_OASIS: 00
CURRENT_FUNCTION: INDEPENDENT

## 2023-11-13 ASSESSMENT — BALANCE ASSESSMENTS
TURN 360 DEGREES: 4
SITTING TO STANDING: 3 - ABLE TO STAND INDEPENDENTLY USING HANDS
STANDING UNSUPPORTED ONE FOOT IN FRONT: 3 - ABLE TO PLACE FOOT AHEAD INDEPENDENTLY AND HOLD 30 SECONDS
STANDING TO SITTING: 3 - CONTROLS DESCENT BY USING HANDS
STANDING UNSUPPORTED WITH FEET TOGETHER: 3
STANDING UNSUPPORTED ONE FOOT IN FRONT: 3
STANDING ON ONE LEG: 1 - TRIES TO LIFT LEG UNABLE TO HOLD 3 SECONDS BUT REMAINS STANDING INDEPENDENTLY
TRANSFERS: 4 - ABLE TO TRANSFER SAFELY WITH MINOR USE OF HANDS
LONG VERSION TOTAL SCORE (MAX 56): 49
STANDING ON ONE LEG: 1
PICK UP OBJECT FROM THE FLOOR FROM A STANDING POSITION: 4 - ABLE TO PICK UP SLIPPER SAFELY AND EASILY
STANDING UNSUPPORTED: 4
STANDING TO SITTING: 3
STANDING UNSUPPORTED WITH EYES CLOSED: 4
STANDING UNSUPPORTED WITH FEET TOGETHER: 3 - ABLE TO PLACE FEET TOGETHER INDEPENDENTLY AND STAND 1 MINUTE WITH SUPERVISION
TRANSFERS: 4
SITTING TO STANDING: 3
STANDING UNSUPPORTED WITH EYES CLOSED: 4 - ABLE TO STAND 10 SECONDS SAFELY
REACHING FORWARD WITH OUTSTRETCHED ARM WHILE STANDING: 4 - CAN REACH FORWARD CONFIDENTLY 25 CM (10 INCHES)
TURN 360 DEGREES: 4 - ABLE TO TURN 360 DEGREES SAFELY IN 4 SECONDS OR LESS

## 2023-11-13 ASSESSMENT — ENCOUNTER SYMPTOMS
SUBJECTIVE PAIN PROGRESSION: WAXING AND WANING
PAIN LOCATION: ABDOMEN
LOWEST PAIN SEVERITY IN PAST 24 HOURS: 0/10
PAIN: 1
PAIN LOCATION - PAIN SEVERITY: 3/10
PAIN SEVERITY GOAL: 0/10
HIGHEST PAIN SEVERITY IN PAST 24 HOURS: 10/10
OCCASIONAL FEELINGS OF UNSTEADINESS: 0

## 2023-11-17 ENCOUNTER — OFFICE VISIT (OUTPATIENT)
Dept: SURGERY | Facility: CLINIC | Age: 88
End: 2023-11-17
Payer: MEDICARE

## 2023-11-17 VITALS
HEART RATE: 64 BPM | TEMPERATURE: 98.1 F | WEIGHT: 114 LBS | BODY MASS INDEX: 18.32 KG/M2 | SYSTOLIC BLOOD PRESSURE: 111 MMHG | DIASTOLIC BLOOD PRESSURE: 59 MMHG | HEIGHT: 66 IN

## 2023-11-17 DIAGNOSIS — C18.2 MALIGNANT NEOPLASM OF ASCENDING COLON (MULTI): Primary | ICD-10-CM

## 2023-11-17 PROCEDURE — 1126F AMNT PAIN NOTED NONE PRSNT: CPT | Performed by: NURSE PRACTITIONER

## 2023-11-17 PROCEDURE — 1160F RVW MEDS BY RX/DR IN RCRD: CPT | Performed by: NURSE PRACTITIONER

## 2023-11-17 PROCEDURE — 1036F TOBACCO NON-USER: CPT | Performed by: NURSE PRACTITIONER

## 2023-11-17 PROCEDURE — 1159F MED LIST DOCD IN RCRD: CPT | Performed by: NURSE PRACTITIONER

## 2023-11-17 PROCEDURE — 99024 POSTOP FOLLOW-UP VISIT: CPT | Performed by: NURSE PRACTITIONER

## 2023-11-17 PROCEDURE — 1111F DSCHRG MED/CURRENT MED MERGE: CPT | Performed by: NURSE PRACTITIONER

## 2023-11-17 ASSESSMENT — ENCOUNTER SYMPTOMS
RECTAL PAIN: 0
HEMATURIA: 0
SHORTNESS OF BREATH: 0
BRUISES/BLEEDS EASILY: 0
APPETITE CHANGE: 0
FREQUENCY: 0
TREMORS: 0
CONFUSION: 0
DIZZINESS: 0
PALPITATIONS: 0
ABDOMINAL DISTENTION: 0
DIAPHORESIS: 0
AGITATION: 0
WEAKNESS: 0
SEIZURES: 0
ACTIVITY CHANGE: 0
NERVOUS/ANXIOUS: 0
HEADACHES: 0
NUMBNESS: 0
DIFFICULTY URINATING: 0
APNEA: 0
BLOOD IN STOOL: 0
POLYPHAGIA: 0
UNEXPECTED WEIGHT CHANGE: 0
SLEEP DISTURBANCE: 0
COLOR CHANGE: 0
ADENOPATHY: 0
ANAL BLEEDING: 0
DYSURIA: 0
FEVER: 0
POLYDIPSIA: 0
WHEEZING: 0
COUGH: 0
ABDOMINAL PAIN: 0
CHEST TIGHTNESS: 0
DIARRHEA: 0
VOMITING: 0
LIGHT-HEADEDNESS: 0
CONSTIPATION: 0
CHILLS: 0
NAUSEA: 0

## 2023-11-17 NOTE — PATIENT INSTRUCTIONS
-Diet: OK to slowly advance diet.   -Activity: Normal activities can be resumed. No lifting greater than 10 lbs for a full 6 weeks following surgery   -Follow-up 3 months  -Pathology was reviewed with the patient.

## 2023-11-17 NOTE — PROGRESS NOTES
Chief Compliant:  POV      History Of Present Illness  Lupis Jackson is a 89 y.o. female here for follow up after surgery.     S/p right colectomy for ascending colon cancer on 10/16/23.  Last seen 11/3/23.    LUQ pain radiating to back continues. Using aspercreme    Denies any F/C, N/V, CP/SOB, dysuria.   Appetite: good  Energy: good  Weight: stable  BM: 1-2 daily soft, formed    Review of Systems   Constitutional:  Negative for activity change, appetite change, chills, diaphoresis, fever and unexpected weight change.   Respiratory:  Negative for apnea, cough, chest tightness, shortness of breath and wheezing.    Cardiovascular:  Negative for chest pain, palpitations and leg swelling.   Gastrointestinal:  Negative for abdominal distention, abdominal pain, anal bleeding, blood in stool, constipation, diarrhea, nausea, rectal pain and vomiting.   Endocrine: Negative for cold intolerance, heat intolerance, polydipsia, polyphagia and polyuria.   Genitourinary:  Negative for difficulty urinating, dysuria, frequency and hematuria.   Musculoskeletal:         Continues to have left upper quadrant pain radiating to the back with activity, Since colonoscopy.    Skin:  Negative for color change.   Neurological:  Negative for dizziness, tremors, seizures, syncope, weakness, light-headedness, numbness and headaches.   Hematological:  Negative for adenopathy. Does not bruise/bleed easily.   Psychiatric/Behavioral:  Negative for agitation, confusion, self-injury, sleep disturbance and suicidal ideas. The patient is not nervous/anxious.         Physical Exam  Constitutional:       Appearance: Normal appearance.   HENT:      Head: Normocephalic.   Neck:      Vascular: No carotid bruit.   Cardiovascular:      Rate and Rhythm: Normal rate and regular rhythm.      Pulses: Normal pulses.      Heart sounds: Normal heart sounds.   Pulmonary:      Effort: Pulmonary effort is normal.      Breath sounds: Normal breath sounds.   Abdominal:       "Comments: Soft, NT, ND. Incision is clean and dry.    Musculoskeletal:      Cervical back: No tenderness.   Lymphadenopathy:      Cervical: No cervical adenopathy.   Neurological:      General: No focal deficit present.      Mental Status: She is alert and oriented to person, place, and time.   Psychiatric:         Mood and Affect: Mood normal.         Behavior: Behavior normal.         Last Recorded Vitals  /59   Pulse 64   Temp 36.7 °C (98.1 °F)   Ht 1.676 m (5' 6\")   Wt 51.7 kg (114 lb)   BMI 18.40 kg/m²     Assessment:  Ascending colon cancer  S/p right colectomy T2N0 +LVI  Doing well post op    Plan:  -Diet: as tolerated  -Activity: Normal activities can be resumed. No lifting greater than 10 lbs for a full 6 weeks following surgery   -Pathology was reviewed with the patient. The follow up protocol for colon cancer was outlined for the patient. These recommendations are based on the [facility]CRS Practice Parameters.   -Office visit and CEA every 3-6 months for 2 years and then every 6 months until 5 years  -Consider CT C/A/P every year for 5 years  -Consider colonoscopy 1 year after diagnosis and subsequent ones determined upon findings.  -Follow up with me in 3 months or sooner if any problems  All questions and concerns were addressed with patient.      GIAN Liao-CNP    "

## 2023-11-25 LAB
ATRIAL RATE: 84 BPM
P AXIS: 77 DEGREES
P OFFSET: 206 MS
P ONSET: 164 MS
PR INTERVAL: 112 MS
Q ONSET: 220 MS
QRS COUNT: 13 BEATS
QRS DURATION: 80 MS
QT INTERVAL: 366 MS
QTC CALCULATION(BAZETT): 432 MS
QTC FREDERICIA: 409 MS
R AXIS: 45 DEGREES
T AXIS: 86 DEGREES
T OFFSET: 403 MS
VENTRICULAR RATE: 84 BPM

## 2023-12-18 ENCOUNTER — OFFICE VISIT (OUTPATIENT)
Dept: PRIMARY CARE | Facility: CLINIC | Age: 88
End: 2023-12-18
Payer: MEDICARE

## 2023-12-18 ENCOUNTER — HOSPITAL ENCOUNTER (OUTPATIENT)
Dept: RADIOLOGY | Facility: HOSPITAL | Age: 88
Discharge: HOME | End: 2023-12-18
Payer: MEDICARE

## 2023-12-18 VITALS
WEIGHT: 117 LBS | HEART RATE: 88 BPM | BODY MASS INDEX: 18.8 KG/M2 | DIASTOLIC BLOOD PRESSURE: 61 MMHG | HEIGHT: 66 IN | SYSTOLIC BLOOD PRESSURE: 111 MMHG

## 2023-12-18 DIAGNOSIS — G89.29 CHRONIC LOW BACK PAIN, UNSPECIFIED BACK PAIN LATERALITY, UNSPECIFIED WHETHER SCIATICA PRESENT: ICD-10-CM

## 2023-12-18 DIAGNOSIS — M54.50 CHRONIC LOW BACK PAIN, UNSPECIFIED BACK PAIN LATERALITY, UNSPECIFIED WHETHER SCIATICA PRESENT: ICD-10-CM

## 2023-12-18 DIAGNOSIS — M41.9 KYPHOSCOLIOSIS DEFORMITY OF SPINE: ICD-10-CM

## 2023-12-18 DIAGNOSIS — M54.6 THORACIC BACK PAIN, UNSPECIFIED BACK PAIN LATERALITY, UNSPECIFIED CHRONICITY: Primary | ICD-10-CM

## 2023-12-18 DIAGNOSIS — M12.9 ARTHROPATHY: ICD-10-CM

## 2023-12-18 DIAGNOSIS — R10.12 LEFT UPPER QUADRANT ABDOMINAL PAIN: ICD-10-CM

## 2023-12-18 DIAGNOSIS — C18.2 MALIGNANT NEOPLASM OF ASCENDING COLON (MULTI): ICD-10-CM

## 2023-12-18 DIAGNOSIS — M54.6 THORACIC BACK PAIN, UNSPECIFIED BACK PAIN LATERALITY, UNSPECIFIED CHRONICITY: ICD-10-CM

## 2023-12-18 PROBLEM — K63.89 MASS OF COLON: Status: RESOLVED | Noted: 2023-10-09 | Resolved: 2023-12-18

## 2023-12-18 PROBLEM — R92.8 ABNORMAL MAMMOGRAM: Status: RESOLVED | Noted: 2023-10-09 | Resolved: 2023-12-18

## 2023-12-18 PROCEDURE — 1126F AMNT PAIN NOTED NONE PRSNT: CPT | Performed by: INTERNAL MEDICINE

## 2023-12-18 PROCEDURE — 1036F TOBACCO NON-USER: CPT | Performed by: INTERNAL MEDICINE

## 2023-12-18 PROCEDURE — 1160F RVW MEDS BY RX/DR IN RCRD: CPT | Performed by: INTERNAL MEDICINE

## 2023-12-18 PROCEDURE — 72070 X-RAY EXAM THORAC SPINE 2VWS: CPT | Mod: FY

## 2023-12-18 PROCEDURE — 99214 OFFICE O/P EST MOD 30 MIN: CPT | Performed by: INTERNAL MEDICINE

## 2023-12-18 PROCEDURE — 1159F MED LIST DOCD IN RCRD: CPT | Performed by: INTERNAL MEDICINE

## 2023-12-18 RX ORDER — PEN NEEDLE, DIABETIC 32GX 5/32"
NEEDLE, DISPOSABLE MISCELLANEOUS
COMMUNITY
Start: 2023-10-31 | End: 2024-02-09

## 2023-12-18 RX ORDER — PANTOPRAZOLE SODIUM 40 MG/1
40 TABLET, DELAYED RELEASE ORAL DAILY
Qty: 30 TABLET | Refills: 1 | Status: SHIPPED | OUTPATIENT
Start: 2023-12-18 | End: 2024-03-21

## 2023-12-18 ASSESSMENT — PATIENT HEALTH QUESTIONNAIRE - PHQ9
SUM OF ALL RESPONSES TO PHQ9 QUESTIONS 1 AND 2: 0
2. FEELING DOWN, DEPRESSED OR HOPELESS: NOT AT ALL
1. LITTLE INTEREST OR PLEASURE IN DOING THINGS: NOT AT ALL

## 2023-12-18 NOTE — PROGRESS NOTES
Assessment/Plan   89 years old female with recent history of colon surgery has had the tumor removed in seems to be on remission.  The protocol were reviewed and patient says she will be following up with the surgeons to follow-up the protocols.    She is hemodynamically stable except her left lower chest versus upper quadrant discomfort.  I will start her on a PPI to make sure that her symptoms are not gastritis.  She says that when she eats her pain improves.  But on my clinical exam this could be musculoskeletal in patient's rib cage is rubbing on the pelvic bones causing this discomfort this was discussed in we will do a x-ray thoracic spine.  There is no clinical evidence of radiculopathy but I would like to exclude compression fractures.    Prevention of fractures were reviewed and discussed at this time I will hold off any alendronate but patient clinically has osteoporosis and may need to be further reviewed and treated after the current testings.    Patient will follow-up with the cardiologist for her regular cardiac care.    Patient overall is stable and she will follow-up with me in 4 weeks time.      Problem List Items Addressed This Visit       Malignant neoplasm of ascending colon (CMS/HCC)    Arthropathy    Chronic low back pain     Other Visit Diagnoses       Thoracic back pain, unspecified back pain laterality, unspecified chronicity    -  Primary    Relevant Orders    XR thoracic spine 2 views    Kyphoscoliosis deformity of spine        Left upper quadrant abdominal pain        Relevant Medications    pantoprazole (ProtoNix) 40 mg EC tablet    Other Relevant Orders    XR thoracic spine 2 views            Subjective     Patient ID: Lupis Jackson is a 89 y.o. female who presents for Follow-up.    History of present illness  Patient came for a follow-up visit.  She lives independently and she says she does shopping and cooking on her own.    She is brought in by her nephew who was sitting outside.  Patient  does not want anybody to be in the room during the office visit but she says that she is capable of making all the decisions.  She has some hard of hearing.    Since my established visit patient has had 2 follow-up visit with the general surgery which are reviewed in detail.    Patient's tumor board review was also noted.  All the margins are free of the tumor.    Patient has recovered but she is still complaining of some discomfort in the left upper quadrant or mid abdominal area.  She says when she eats her pain feels little better.  She feels like this happened after she had colonoscopy and since then it has been continuing.  She has had CT scans done after that and also she had surgery done which she has recovered reasonably well.    She denies any chest pain or short of breath.  She has some chronic back pain.  The back pain is more in the upper back or in the chest area.  She has had no recent trauma.  There is no history of fall.    She has no urine symptoms.  No chest pain or palpitations.  No nausea vomiting.  She is gradually gaining some weight by eating better.  ROS  Rest of the review of systems no acute complaints.  Family History   Problem Relation Name Age of Onset    No Known Problems Mother      No Known Problems Father        Social History     Socioeconomic History    Marital status:      Spouse name: Not on file    Number of children: Not on file    Years of education: Not on file    Highest education level: Not on file   Occupational History    Not on file   Tobacco Use    Smoking status: Former     Types: Cigarettes     Quit date:      Years since quittin.9    Smokeless tobacco: Never   Vaping Use    Vaping Use: Never used   Substance and Sexual Activity    Alcohol use: Never    Drug use: Never    Sexual activity: Defer   Other Topics Concern    Not on file   Social History Narrative    Not on file     Social Determinants of Health     Financial Resource Strain: Low Risk   "(10/16/2023)    Overall Financial Resource Strain (CARDIA)     Difficulty of Paying Living Expenses: Not hard at all   Food Insecurity: Not on file   Transportation Needs: No Transportation Needs (11/13/2023)    OASIS : Transportation     Lack of Transportation (Medical): No     Lack of Transportation (Non-Medical): No     Patient Unable or Declines to Respond: No   Physical Activity: Not on file   Stress: Not on file   Social Connections: Feeling Socially Integrated (11/13/2023)    OASIS : Social Isolation     Frequency of experiencing loneliness or isolation: Rarely   Intimate Partner Violence: Not on file   Housing Stability: Low Risk  (10/16/2023)    Housing Stability Vital Sign     Unable to Pay for Housing in the Last Year: No     Number of Places Lived in the Last Year: 1     Unstable Housing in the Last Year: No      Patient has no known allergies.   Current Outpatient Medications   Medication Sig Dispense Refill    aspirin 81 mg EC tablet Take 1 tablet (81 mg) by mouth once daily.      atorvastatin (Lipitor) 10 mg tablet Take 1 tablet (10 mg) by mouth once daily in the evening.      cholecalciferol (Vitamin D-3) 50 MCG (2000 UT) tablet Take 1 tablet (2,000 Units) by mouth once daily.      ferrous gluconate 256 mg (28 mg iron) tablet Take 1 tablet (28 mg) by mouth once daily. 30 tablet 2    insulin glargine (Lantus U-100 Insulin) 100 unit/mL injection Inject 8 Units under the skin once daily at bedtime.      levothyroxine (Synthroid, Levoxyl) 112 mcg tablet TAKE ONE TABLET BY MOUTH DAILY 90 tablet 3    meclizine (Antivert) 12.5 mg tablet TAKE ONE TABLET BY MOUTH EVERY 12 HOURS AS NEEDED 40 tablet 0    mv-calcium-min-iron fm-FA-vitK (One-A-Day Women's Complete,vK,) 18 mg-400 mcg- 25 mcg tablet Take 1 tablet by mouth once daily.      PARoxetine (Paxil) 20 mg tablet take 1 and 1/2 tablets by mouth daily. 135 tablet 0    Sure Comfort Pen Needle 32 gauge x 5/32\" needle USE ONE EVERY DAY      " pantoprazole (ProtoNix) 40 mg EC tablet Take 1 tablet (40 mg) by mouth once daily. Do not crush, chew, or split. 30 tablet 1     No current facility-administered medications for this visit.      Objective     Vitals:    12/18/23 1324   BP: 111/61   Pulse: 88        Physical Exam    Thin built pleasant elderly female, well-nourished  with no apparent distress. Alert oriented  Skin:  Normal turgor.  No rash.  Head:  Normocephalic, atraumatic.  Eyes:  Pupils are equal, round,.  Has hard of hearing.  No pallor of conjunctivae.  Mouth has moist oral mucosa.   Neck:  Supple.  No JVD.  No carotid bruit.  No thyromegaly. No cervical lymphadenopathy.  No clubbing, significant peripheral osteoarthritis noted  Has kyphoscoliosis of the thoracic spine noted  Left rib cage and the sacral bone distances minimum even when patient is sitting upright.  Chest:  Vesicular breathing Bilaterally moderate air entry and bilaterally clear to auscultation.  No wheezing.  No crackles.  Heart:  Regular rate with intermittent ectopics and rhythm.  S1, S2 positive.  No murmur.  Abdomen:  Soft, slight discomfort in the left lower quadrant but no redness.  No signs of peritonitis, has staples in the laparoscopic scar tissue.  Underlying skin has well-healed.  Bowel sounds are positive.  No organomegaly.  Extremities:  Bilaterally no pedal pitting edema.  Bilaterally 2+ dorsalis pedis pulses.  No calf tenderness. Homans sign is negative.  Neuro Exam: No focal signs. Gait is normal.      Problem List Items Addressed This Visit       Malignant neoplasm of ascending colon (CMS/HCC)    Arthropathy    Chronic low back pain     Other Visit Diagnoses       Thoracic back pain, unspecified back pain laterality, unspecified chronicity    -  Primary    Relevant Orders    XR thoracic spine 2 views    Kyphoscoliosis deformity of spine        Left upper quadrant abdominal pain        Relevant Medications    pantoprazole (ProtoNix) 40 mg EC tablet    Other  Relevant Orders    XR thoracic spine 2 views             Orders Placed This Encounter   Procedures    XR thoracic spine 2 views     Standing Status:   Future     Number of Occurrences:   1     Standing Expiration Date:   12/18/2024     Order Specific Question:   Reason for exam:     Answer:   upper back pain     Order Specific Question:   Radiologist to Determine Optimal Study     Answer:   Yes     Order Specific Question:   Release result to [facility]     Answer:   Immediate [1]     Order Specific Question:   Is this exam part of a Research Study? If Yes, link this order to the research study     Answer:   No        Lab Results   Component Value Date    WBC 6.0 10/19/2023    HGB 9.2 (L) 10/19/2023    HCT 29.1 (L) 10/19/2023     10/19/2023    CHOL 136 02/02/2023    TRIG 163 (H) 04/07/2021    HDL 47.8 02/02/2023    ALT 10 10/19/2023    AST 15 10/19/2023     (L) 10/19/2023    K 3.5 10/19/2023     10/19/2023    CREATININE 0.55 10/19/2023    BUN 7 10/19/2023    CO2 28 10/19/2023    TSH 2.44 02/02/2023    INR 0.9 10/16/2023    HGBA1C 6.9 04/07/2021     Lab Results   Component Value Date    CHOL 136 02/02/2023    CHHDL 2.8 02/02/2023       No results found.

## 2023-12-20 ENCOUNTER — TELEPHONE (OUTPATIENT)
Dept: PRIMARY CARE | Facility: CLINIC | Age: 88
End: 2023-12-20
Payer: MEDICARE

## 2023-12-20 NOTE — RESULT ENCOUNTER NOTE
X-ray of the spine shows no acute fracture but there chronic arthritis and kyphoscoliosis as we discussed during the office visit.  Please advise patient to continue the same medications and follow-up to review in detail and discuss the management options.

## 2023-12-20 NOTE — TELEPHONE ENCOUNTER
----- Message from Colton Menjivar MD sent at 12/20/2023  8:41 AM EST -----  X-ray of the spine shows no acute fracture but there chronic arthritis and kyphoscoliosis as we discussed during the office visit.  Please advise patient to continue the same medications and follow-up to review in detail and discuss the management options.

## 2023-12-20 NOTE — TELEPHONE ENCOUNTER
Telephone call to patient, spoke to her power of , results given, advised to keep taking the same medications and follow up at the next scheduled appointment in January.

## 2023-12-21 ENCOUNTER — PROCEDURE VISIT (OUTPATIENT)
Dept: PODIATRY | Facility: CLINIC | Age: 88
End: 2023-12-21
Payer: MEDICARE

## 2023-12-21 DIAGNOSIS — M79.674 PAIN IN TOES OF BOTH FEET: ICD-10-CM

## 2023-12-21 DIAGNOSIS — B35.1 ONYCHOMYCOSIS: Primary | ICD-10-CM

## 2023-12-21 DIAGNOSIS — M79.675 PAIN IN TOES OF BOTH FEET: ICD-10-CM

## 2023-12-21 PROCEDURE — 11721 DEBRIDE NAIL 6 OR MORE: CPT | Performed by: PODIATRIST

## 2023-12-21 NOTE — PROGRESS NOTES
History Of Present Illness  Luips Jackson is a 89 y.o. female presenting for diabetic nail care. Patient complains with painful elongated nails.     Past Medical History  She has a past medical history of Abnormal mammogram (10/09/2023), Breast cancer (CMS/[facility]), Cataract, Colorectal cancer (CMS/[facility]), Coronary artery disease, Dizziness, Essential (primary) hypertension (04/07/2022), HL (hearing loss), Hyperlipidemia, unspecified (07/26/2021), Hypothyroidism, Myocardial infarction (CMS/[facility]), Presence of coronary angioplasty implant and graft (07/27/2020), Type 2 diabetes mellitus without complications (CMS/[facility]) (12/15/2022), and Vitamin D deficiency, unspecified (04/22/2019).    Surgical History  She has a past surgical history that includes Other surgical history (12/10/2018); Colonoscopy; and Cataract extraction.     Social History  She reports that she quit smoking about 48 years ago. Her smoking use included cigarettes. She has never used smokeless tobacco. She reports that she does not drink alcohol and does not use drugs.    Family History  Family History   Problem Relation Name Age of Onset    No Known Problems Mother      No Known Problems Father          Allergies  Patient has no known allergies.    Medications  Current Outpatient Medications   Medication Sig Dispense Refill    aspirin 81 mg EC tablet Take 1 tablet (81 mg) by mouth once daily.      atorvastatin (Lipitor) 10 mg tablet Take 1 tablet (10 mg) by mouth once daily in the evening.      cholecalciferol (Vitamin D-3) 50 MCG (2000 UT) tablet Take 1 tablet (2,000 Units) by mouth once daily.      ferrous gluconate 256 mg (28 mg iron) tablet Take 1 tablet (28 mg) by mouth once daily. 30 tablet 2    insulin glargine (Lantus U-100 Insulin) 100 unit/mL injection Inject 8 Units under the skin once daily at bedtime.      levothyroxine (Synthroid, Levoxyl) 112 mcg tablet TAKE ONE TABLET BY MOUTH DAILY 90 tablet 3    meclizine (Antivert) 12.5 mg tablet TAKE ONE TABLET  "BY MOUTH EVERY 12 HOURS AS NEEDED 40 tablet 0    mv-calcium-min-iron fm-FA-vitK (One-A-Day Women's Complete,vK,) 18 mg-400 mcg- 25 mcg tablet Take 1 tablet by mouth once daily.      pantoprazole (ProtoNix) 40 mg EC tablet Take 1 tablet (40 mg) by mouth once daily. Do not crush, chew, or split. 30 tablet 1    PARoxetine (Paxil) 20 mg tablet take 1 and 1/2 tablets by mouth daily. 135 tablet 0    Sure Comfort Pen Needle 32 gauge x 5/32\" needle USE ONE EVERY DAY       No current facility-administered medications for this visit.       Review of Systems    REVIEW OF SYSTEMS  GENERAL:  Negative for malaise, significant weight loss, fever  CARDIOVASCULAR: leg swelling   MUSCULOSKELETAL:  Negative for joint pain or swelling, back pain, and muscle pain.  SKIN:  Negative for lesions, rash, and itching  PSYCH:  Negative for sleep disturbance, mood disorder and recent psychosocial stressors  NEURO: Negative, denies any burning, tingling or numbness     Objective:   Vasc: DP and PT pulses are palpable bilateral.  CFT is less than 3 seconds bilateral.  Skin temperature is warm to cool proximal to distal bilateral.      Neuro:  Light touch is intact to the foot bilateral.      Derm: Nails 1-5 bilateral are thickened, elongated and crumbly with subungual debris. Skin is supple with normal texture and turgor noted.  Webspaces are clean, dry and intact bilateral.  There are no hyperkeratoses, ulcerations, verruca or other lesions noted.      Ortho: Muscle strength is 5/5 for all pedal groups tested.  Ankle joint, subtalar joint, 1st MPJ and lesser MPJ ROM is full and without pain or crepitus.  The foot type is rectus bilateral off weight bearing.  There are no structural deformities noted.    Assessment/Plan     Diagnoses and all orders for this visit:  Onychomycosis  Pain in toes of both feet      Toenails are debrided in length and thickness to avoid infection and for pain relief  Nellie Gutierrez-Valorie, YUN  [id number] [address] " [place] [phone]

## 2024-01-09 ENCOUNTER — LAB (OUTPATIENT)
Dept: LAB | Facility: LAB | Age: 89
End: 2024-01-09
Payer: MEDICARE

## 2024-01-09 DIAGNOSIS — E78.2 MIXED HYPERLIPIDEMIA: ICD-10-CM

## 2024-01-09 DIAGNOSIS — E78.1 ESSENTIAL HYPERTRIGLYCERIDEMIA: ICD-10-CM

## 2024-01-09 DIAGNOSIS — F32.A DEPRESSION, UNSPECIFIED DEPRESSION TYPE: ICD-10-CM

## 2024-01-09 LAB
ALBUMIN SERPL BCP-MCNC: 3.9 G/DL (ref 3.4–5)
ALP SERPL-CCNC: 84 U/L (ref 33–136)
ALT SERPL W P-5'-P-CCNC: 16 U/L (ref 7–45)
ANION GAP SERPL CALC-SCNC: 14 MMOL/L (ref 10–20)
AST SERPL W P-5'-P-CCNC: 22 U/L (ref 9–39)
BASOPHILS # BLD AUTO: 0.05 X10*3/UL (ref 0–0.1)
BASOPHILS NFR BLD AUTO: 0.6 %
BILIRUB SERPL-MCNC: 0.3 MG/DL (ref 0–1.2)
BUN SERPL-MCNC: 16 MG/DL (ref 6–23)
CALCIUM SERPL-MCNC: 9.7 MG/DL (ref 8.6–10.3)
CHLORIDE SERPL-SCNC: 102 MMOL/L (ref 98–107)
CO2 SERPL-SCNC: 27 MMOL/L (ref 21–32)
CREAT SERPL-MCNC: 0.92 MG/DL (ref 0.5–1.05)
EGFRCR SERPLBLD CKD-EPI 2021: 57 ML/MIN/1.73M*2
EOSINOPHIL # BLD AUTO: 0.26 X10*3/UL (ref 0–0.4)
EOSINOPHIL NFR BLD AUTO: 3.2 %
ERYTHROCYTE [DISTWIDTH] IN BLOOD BY AUTOMATED COUNT: 12.8 % (ref 11.5–14.5)
GLUCOSE SERPL-MCNC: 199 MG/DL (ref 74–99)
HCT VFR BLD AUTO: 39.2 % (ref 36–46)
HGB BLD-MCNC: 11.7 G/DL (ref 12–16)
IMM GRANULOCYTES # BLD AUTO: 0.02 X10*3/UL (ref 0–0.5)
IMM GRANULOCYTES NFR BLD AUTO: 0.2 % (ref 0–0.9)
LYMPHOCYTES # BLD AUTO: 2.38 X10*3/UL (ref 0.8–3)
LYMPHOCYTES NFR BLD AUTO: 29.6 %
MCH RBC QN AUTO: 29.8 PG (ref 26–34)
MCHC RBC AUTO-ENTMCNC: 29.8 G/DL (ref 32–36)
MCV RBC AUTO: 100 FL (ref 80–100)
MONOCYTES # BLD AUTO: 0.5 X10*3/UL (ref 0.05–0.8)
MONOCYTES NFR BLD AUTO: 6.2 %
NEUTROPHILS # BLD AUTO: 4.82 X10*3/UL (ref 1.6–5.5)
NEUTROPHILS NFR BLD AUTO: 60.2 %
NRBC BLD-RTO: 0 /100 WBCS (ref 0–0)
PLATELET # BLD AUTO: 387 X10*3/UL (ref 150–450)
POTASSIUM SERPL-SCNC: 4.6 MMOL/L (ref 3.5–5.3)
PROT SERPL-MCNC: 6.7 G/DL (ref 6.4–8.2)
RBC # BLD AUTO: 3.92 X10*6/UL (ref 4–5.2)
SODIUM SERPL-SCNC: 138 MMOL/L (ref 136–145)
WBC # BLD AUTO: 8 X10*3/UL (ref 4.4–11.3)

## 2024-01-09 PROCEDURE — 80053 COMPREHEN METABOLIC PANEL: CPT

## 2024-01-09 PROCEDURE — 36415 COLL VENOUS BLD VENIPUNCTURE: CPT

## 2024-01-09 PROCEDURE — 85025 COMPLETE CBC W/AUTO DIFF WBC: CPT

## 2024-01-09 RX ORDER — PAROXETINE HYDROCHLORIDE 20 MG/1
TABLET, FILM COATED ORAL
Qty: 135 TABLET | Refills: 0 | OUTPATIENT
Start: 2024-01-09

## 2024-01-10 ENCOUNTER — TELEPHONE (OUTPATIENT)
Dept: PRIMARY CARE | Facility: CLINIC | Age: 89
End: 2024-01-10
Payer: MEDICARE

## 2024-01-10 NOTE — TELEPHONE ENCOUNTER
Telephone call to patient, spoke with patient, informed her of recent lab work, Labs acceptable.  Advised patient to continue the same medications and keep the follow-up appointment to review the results in detail.

## 2024-01-10 NOTE — TELEPHONE ENCOUNTER
----- Message from Colton Menjivar MD sent at 1/9/2024  4:44 PM EST -----  Labs acceptable.  Please advise patient to continue the same medications and keep the follow-up appointment to review the results in detail.

## 2024-01-16 ENCOUNTER — OFFICE VISIT (OUTPATIENT)
Dept: PRIMARY CARE | Facility: CLINIC | Age: 89
End: 2024-01-16
Payer: MEDICARE

## 2024-01-16 VITALS
HEART RATE: 77 BPM | HEIGHT: 66 IN | SYSTOLIC BLOOD PRESSURE: 126 MMHG | DIASTOLIC BLOOD PRESSURE: 66 MMHG | BODY MASS INDEX: 19.19 KG/M2 | WEIGHT: 119.4 LBS

## 2024-01-16 DIAGNOSIS — E46 PROTEIN-CALORIE MALNUTRITION, UNSPECIFIED SEVERITY (MULTI): ICD-10-CM

## 2024-01-16 DIAGNOSIS — N18.31 STAGE 3A CHRONIC KIDNEY DISEASE (MULTI): Primary | ICD-10-CM

## 2024-01-16 DIAGNOSIS — Z79.4 OTHER SPECIFIED DIABETES MELLITUS WITH OTHER SPECIFIED COMPLICATION, WITH LONG-TERM CURRENT USE OF INSULIN (MULTI): ICD-10-CM

## 2024-01-16 DIAGNOSIS — E13.69 OTHER SPECIFIED DIABETES MELLITUS WITH OTHER SPECIFIED COMPLICATION, WITH LONG-TERM CURRENT USE OF INSULIN (MULTI): ICD-10-CM

## 2024-01-16 DIAGNOSIS — C18.2 MALIGNANT NEOPLASM OF ASCENDING COLON (MULTI): ICD-10-CM

## 2024-01-16 DIAGNOSIS — R32 INCONTINENCE IN FEMALE: ICD-10-CM

## 2024-01-16 DIAGNOSIS — H81.10 BENIGN PAROXYSMAL POSITIONAL VERTIGO, UNSPECIFIED LATERALITY: ICD-10-CM

## 2024-01-16 DIAGNOSIS — G89.29 OTHER CHRONIC PAIN: ICD-10-CM

## 2024-01-16 PROCEDURE — 1160F RVW MEDS BY RX/DR IN RCRD: CPT | Performed by: INTERNAL MEDICINE

## 2024-01-16 PROCEDURE — 1126F AMNT PAIN NOTED NONE PRSNT: CPT | Performed by: INTERNAL MEDICINE

## 2024-01-16 PROCEDURE — 99214 OFFICE O/P EST MOD 30 MIN: CPT | Performed by: INTERNAL MEDICINE

## 2024-01-16 PROCEDURE — 1036F TOBACCO NON-USER: CPT | Performed by: INTERNAL MEDICINE

## 2024-01-16 PROCEDURE — 1159F MED LIST DOCD IN RCRD: CPT | Performed by: INTERNAL MEDICINE

## 2024-01-16 RX ORDER — INFANT FORM.IRON LAC-F/DHA/ARA 3.1 G/1
237 POWDER (GRAM) ORAL
Qty: 4740 ML | Refills: 3 | Status: SHIPPED | OUTPATIENT
Start: 2024-01-16

## 2024-01-16 RX ORDER — TRAMADOL HYDROCHLORIDE 50 MG/1
25 TABLET ORAL 2 TIMES DAILY PRN
Qty: 10 TABLET | Refills: 0 | Status: SHIPPED | OUTPATIENT
Start: 2024-01-16 | End: 2024-01-26

## 2024-01-16 NOTE — PROGRESS NOTES
Assessment/Plan   This is a 89 years old female who has had history of colon cancer had surgery done came for a follow-up visit.  She is complaining of pain which does not seems to be from any cancer but it is musculoskeletal on my review but she needs to follow-up with the oncology department to make sure there is no cancer recurrence which she understands.    She is better with the pantoprazole which she started for abdominal pain but because of the musculoskeletal pain I will start her on a very small dose of tramadol.  She understands to cut the tablet of 50 mg and take it twice a day as needed basis.  Side effects were reviewed and discussed.    Patient's overall nutritional status seems to be poor.  I had a detailed discussion and advised her to start dietary supplement after each meal.  Will also have nutritional consult.    Lab tests were reviewed and discussed and patient is stable at this time and her blood sugars are controlled.    Patient's anemia is improved.    Patient will be followed up in about 4 to 6 weeks time to have a reevaluation.    Fall prevention was discussed and patient is agreeable to use a cane.      Problem List Items Addressed This Visit       Malignant neoplasm of ascending colon (CMS/HCC)    BPPV (benign paroxysmal positional vertigo)    Other specified diabetes mellitus with other specified complication, with long-term current use of insulin (CMS/HCC)    Incontinence in female    Protein-calorie malnutrition, unspecified severity (CMS/HCC)    Relevant Medications    nut.tx.gluc intol,lf,soy-fiber (Glucerna 1.5 Maynor) 0.08-1.5 gram-kcal/mL liquid    Other Relevant Orders    Referral to Nutrition Services    Stage 3a chronic kidney disease (CMS/HCC) - Primary     Other Visit Diagnoses       Other chronic pain        Relevant Medications    traMADol (Ultram) 50 mg tablet            Subjective     Patient ID: Lupis Jackson is a 89 y.o. female who presents for Follow-up, Results, Groin Pain  (right), Hip Pain (left), and states she hurts all over.    History of present illness  Patient came for a follow-up visit and she says that she is recovering but has some concern.  The pantoprazole is helping her abdominal discomfort but she is still have the discomfort in her left hip area.  She says every time she is sitting up or moving it hurts and the pain is 8 out of 10.  She also has had some discomfort in the right hip.  She says she will talk to the oncology department regarding the pain.  She had no history of fall.    She eats 3 meals and she says her appetite is decent.  She does her cooking as usual.  She does not snack in between.    She has cane and walker at home but she does not use them.  No history of falls.    Her back pain is stable.    She says she tolerates the medication well.  Her blood sugars are well-controlled.    Her labs from 2024 reviewed and discussed.  ROS  No other acute complaints.  Family History   Problem Relation Name Age of Onset    No Known Problems Mother      No Known Problems Father        Social History     Socioeconomic History    Marital status:      Spouse name: Not on file    Number of children: Not on file    Years of education: Not on file    Highest education level: Not on file   Occupational History    Not on file   Tobacco Use    Smoking status: Former     Types: Cigarettes     Quit date:      Years since quittin.0     Passive exposure: Past    Smokeless tobacco: Never   Vaping Use    Vaping Use: Never used   Substance and Sexual Activity    Alcohol use: Never    Drug use: Never    Sexual activity: Defer   Other Topics Concern    Not on file   Social History Narrative    Not on file     Social Determinants of Health     Financial Resource Strain: Low Risk  (10/16/2023)    Overall Financial Resource Strain ([facility])     Difficulty of Paying Living Expenses: Not hard at all   Food Insecurity: Not on file   Transportation Needs: No Transportation  "Needs (11/13/2023)    OASIS : Transportation     Lack of Transportation (Medical): No     Lack of Transportation (Non-Medical): No     Patient Unable or Declines to Respond: No   Physical Activity: Not on file   Stress: Not on file   Social Connections: Feeling Socially Integrated (11/13/2023)    OASIS : Social Isolation     Frequency of experiencing loneliness or isolation: Rarely   Intimate Partner Violence: Not on file   Housing Stability: Low Risk  (10/16/2023)    Housing Stability Vital Sign     Unable to Pay for Housing in the Last Year: No     Number of Places Lived in the Last Year: 1     Unstable Housing in the Last Year: No      Patient has no known allergies.   Current Outpatient Medications   Medication Sig Dispense Refill    aspirin 81 mg EC tablet Take 1 tablet (81 mg) by mouth once daily.      atorvastatin (Lipitor) 10 mg tablet Take 1 tablet (10 mg) by mouth once daily in the evening.      cholecalciferol (Vitamin D-3) 50 MCG (2000 UT) tablet Take 1 tablet (2,000 Units) by mouth once daily.      ferrous gluconate 256 mg (28 mg iron) tablet Take 1 tablet (28 mg) by mouth once daily. 30 tablet 2    insulin glargine (Lantus U-100 Insulin) 100 unit/mL injection Inject 8 Units under the skin once daily at bedtime.      levothyroxine (Synthroid, Levoxyl) 112 mcg tablet TAKE ONE TABLET BY MOUTH DAILY 90 tablet 3    meclizine (Antivert) 12.5 mg tablet TAKE ONE TABLET BY MOUTH EVERY 12 HOURS AS NEEDED 40 tablet 0    mv-calcium-min-iron fm-FA-vitK (One-A-Day Women's Complete,vK,) 18 mg-400 mcg- 25 mcg tablet Take 1 tablet by mouth once daily.      pantoprazole (ProtoNix) 40 mg EC tablet Take 1 tablet (40 mg) by mouth once daily. Do not crush, chew, or split. 30 tablet 1    PARoxetine (Paxil) 20 mg tablet take 1 and 1/2 tablets by mouth daily. 135 tablet 0    Sure Comfort Pen Needle 32 gauge x 5/32\" needle USE ONE EVERY DAY      nut.tx.gluc intol,lf,soy-fiber (Glucerna 1.5 Maynor) 0.08-1.5 gram-kcal/mL " liquid Take 237 mL by mouth 2 times daily after breakfast and lunch. 4740 mL 3    traMADol (Ultram) 50 mg tablet Take 0.5 tablets (25 mg) by mouth 2 times a day as needed for severe pain (7 - 10) for up to 10 days. 10 tablet 0     No current facility-administered medications for this visit.      Objective     Vitals:    01/16/24 1340   BP: 126/66   Pulse: 77        Physical Exam    Thin built pleasant elderly female,  with no apparent distress. Alert oriented  Skin:  Normal turgor.  No rash.  Head:  Normocephalic, atraumatic.  Eyes:  Pupils are equal, round,.  Has hard of hearing.  No pallor of conjunctivae.  Mouth has moist oral mucosa.   Neck:  Supple.  No JVD.  No carotid bruit.  No thyromegaly. No cervical lymphadenopathy.  No clubbing, significant peripheral osteoarthritis noted  Has kyphoscoliosis of the thoracic spine noted  Left rib cage and the sacral bone distances is minimum even when patient is sitting upright.  Chest:  Vesicular breathing Bilaterally moderate air entry and bilaterally clear to auscultation.  No wheezing.  No crackles.  Heart:  Regular rate with intermittent ectopics and rhythm.  S1, S2 positive.  No murmur.  Abdomen:  Soft, slight discomfort in the left lower quadrant but no redness.  Scar tissue and underlying skin has well-healed.  Bowel sounds are positive.  No organomegaly.  Patient's rib cage is touching the ischial bone upper crest on palpation where she has the discomfort.  Extremities:  Bilaterally no pedal pitting edema.  Bilaterally 2+ dorsalis pedis pulses.  No calf tenderness. Homans sign is negative.  Neuro Exam: No focal signs.  Able to walk independently.      Problem List Items Addressed This Visit       Malignant neoplasm of ascending colon (CMS/HCC)    BPPV (benign paroxysmal positional vertigo)    Other specified diabetes mellitus with other specified complication, with long-term current use of insulin (CMS/HCC)    Incontinence in female    Protein-calorie  malnutrition, unspecified severity (CMS/HCC)    Relevant Medications    nut.tx.gluc intol,lf,soy-fiber (Glucerna 1.5 Maynor) 0.08-1.5 gram-kcal/mL liquid    Other Relevant Orders    Referral to Nutrition Services    Stage 3a chronic kidney disease (CMS/[facility]) - Primary     Other Visit Diagnoses       Other chronic pain        Relevant Medications    traMADol (Ultram) 50 mg tablet             Orders Placed This Encounter   Procedures    Referral to Nutrition Services     Standing Status:   Future     Standing Expiration Date:   7/16/2024     Referral Priority:   Routine     Referral Type:   Consultation     Referral Reason:   Specialty Services Required     Requested Specialty:   Nutrition     Number of Visits Requested:   1        Lab Results   Component Value Date    WBC 8.0 01/09/2024    HGB 11.7 (L) 01/09/2024    HCT 39.2 01/09/2024     01/09/2024    CHOL 136 02/02/2023    TRIG 163 (H) 04/07/2021    HDL 47.8 02/02/2023    ALT 16 01/09/2024    AST 22 01/09/2024     01/09/2024    K 4.6 01/09/2024     01/09/2024    CREATININE 0.92 01/09/2024    BUN 16 01/09/2024    CO2 27 01/09/2024    TSH 2.44 02/02/2023    INR 0.9 10/16/2023    HGBA1C 6.9 04/07/2021     Lab Results   Component Value Date    CHOL 136 02/02/2023    CHHDL 2.8 02/02/2023       XR thoracic spine 2 views    Result Date: 12/19/2023  Interpreted By:  Caroline Redding, STUDY: Thoracic spine, 3 views.   INDICATION: Signs/Symptoms:upper back pain.   COMPARISON: None.   ACCESSION NUMBER(S): [id number]   ORDERING CLINICIAN: SANDRA EVANS   FINDINGS: There is moderate dextroscoliosis centered in the lower thoracic region. There is mild levoscoliosis centered in the upper thoracic region. There is moderate levoscoliosis centered in the mid lumbar region. Moderate degenerative changes of the lower thoracic spine and upper lumbar spine noted with disc height loss and osteophytes. Atherosclerosis of the abdominal aorta noted. Vertebral body heights are  well maintained. Posterior elements appear to be intact.       Moderate S shaped thoracolumbar scoliosis as described above. Moderate degenerative changes of the lower thoracic spine and upper lumbar spine with disc height loss and osteophytes.   MACRO: None.   Signed by: Caroline Redding 12/19/2023 6:23 PM Dictation workstation:   [id number]

## 2024-02-09 DIAGNOSIS — F32.A DEPRESSION, UNSPECIFIED DEPRESSION TYPE: ICD-10-CM

## 2024-02-09 DIAGNOSIS — Z79.4 OTHER SPECIFIED DIABETES MELLITUS WITH OTHER SPECIFIED COMPLICATION, WITH LONG-TERM CURRENT USE OF INSULIN (MULTI): Primary | ICD-10-CM

## 2024-02-09 DIAGNOSIS — E13.69 OTHER SPECIFIED DIABETES MELLITUS WITH OTHER SPECIFIED COMPLICATION, WITH LONG-TERM CURRENT USE OF INSULIN (MULTI): Primary | ICD-10-CM

## 2024-02-09 RX ORDER — PEN NEEDLE, DIABETIC 32GX 5/32"
NEEDLE, DISPOSABLE MISCELLANEOUS
Qty: 100 EACH | Refills: 2 | Status: SHIPPED | OUTPATIENT
Start: 2024-02-09

## 2024-02-11 RX ORDER — PAROXETINE HYDROCHLORIDE 20 MG/1
TABLET, FILM COATED ORAL
Qty: 135 TABLET | Refills: 0 | OUTPATIENT
Start: 2024-02-11

## 2024-02-23 ENCOUNTER — APPOINTMENT (OUTPATIENT)
Dept: SURGERY | Facility: CLINIC | Age: 89
End: 2024-02-23
Payer: MEDICARE

## 2024-02-27 ENCOUNTER — APPOINTMENT (OUTPATIENT)
Dept: PRIMARY CARE | Facility: CLINIC | Age: 89
End: 2024-02-27
Payer: MEDICARE

## 2024-02-28 ENCOUNTER — LAB (OUTPATIENT)
Dept: LAB | Facility: LAB | Age: 89
End: 2024-02-28
Payer: MEDICARE

## 2024-02-28 ENCOUNTER — OFFICE VISIT (OUTPATIENT)
Dept: PRIMARY CARE | Facility: CLINIC | Age: 89
End: 2024-02-28
Payer: MEDICARE

## 2024-02-28 VITALS
TEMPERATURE: 97.9 F | BODY MASS INDEX: 19.09 KG/M2 | WEIGHT: 118.8 LBS | SYSTOLIC BLOOD PRESSURE: 144 MMHG | HEART RATE: 43 BPM | HEIGHT: 66 IN | DIASTOLIC BLOOD PRESSURE: 59 MMHG

## 2024-02-28 DIAGNOSIS — E13.69 OTHER SPECIFIED DIABETES MELLITUS WITH OTHER SPECIFIED COMPLICATION, WITH LONG-TERM CURRENT USE OF INSULIN (MULTI): ICD-10-CM

## 2024-02-28 DIAGNOSIS — Z79.4 OTHER SPECIFIED DIABETES MELLITUS WITH OTHER SPECIFIED COMPLICATION, WITH LONG-TERM CURRENT USE OF INSULIN (MULTI): ICD-10-CM

## 2024-02-28 DIAGNOSIS — I25.10 CORONARY ARTERY DISEASE INVOLVING NATIVE CORONARY ARTERY OF NATIVE HEART WITHOUT ANGINA PECTORIS: ICD-10-CM

## 2024-02-28 DIAGNOSIS — R19.7 DIARRHEA, UNSPECIFIED TYPE: Primary | ICD-10-CM

## 2024-02-28 DIAGNOSIS — R19.7 DIARRHEA, UNSPECIFIED TYPE: ICD-10-CM

## 2024-02-28 DIAGNOSIS — C18.2 MALIGNANT NEOPLASM OF ASCENDING COLON (MULTI): ICD-10-CM

## 2024-02-28 DIAGNOSIS — E46 PROTEIN-CALORIE MALNUTRITION, UNSPECIFIED SEVERITY (MULTI): ICD-10-CM

## 2024-02-28 DIAGNOSIS — E03.9 ADULT HYPOTHYROIDISM: ICD-10-CM

## 2024-02-28 DIAGNOSIS — Z95.5 HISTORY OF CORONARY ARTERY STENT PLACEMENT: ICD-10-CM

## 2024-02-28 LAB
ANION GAP SERPL CALC-SCNC: 12 MMOL/L (ref 10–20)
BASOPHILS # BLD AUTO: 0.04 X10*3/UL (ref 0–0.1)
BASOPHILS NFR BLD AUTO: 0.6 %
BUN SERPL-MCNC: 21 MG/DL (ref 6–23)
CALCIUM SERPL-MCNC: 10.1 MG/DL (ref 8.6–10.3)
CHLORIDE SERPL-SCNC: 103 MMOL/L (ref 98–107)
CO2 SERPL-SCNC: 29 MMOL/L (ref 21–32)
CREAT SERPL-MCNC: 0.75 MG/DL (ref 0.5–1.05)
EGFRCR SERPLBLD CKD-EPI 2021: 73 ML/MIN/1.73M*2
EOSINOPHIL # BLD AUTO: 0.12 X10*3/UL (ref 0–0.4)
EOSINOPHIL NFR BLD AUTO: 1.7 %
ERYTHROCYTE [DISTWIDTH] IN BLOOD BY AUTOMATED COUNT: 12.6 % (ref 11.5–14.5)
GLUCOSE SERPL-MCNC: 227 MG/DL (ref 74–99)
HCT VFR BLD AUTO: 39.2 % (ref 36–46)
HGB BLD-MCNC: 12.1 G/DL (ref 12–16)
IMM GRANULOCYTES # BLD AUTO: 0.01 X10*3/UL (ref 0–0.5)
IMM GRANULOCYTES NFR BLD AUTO: 0.1 % (ref 0–0.9)
LYMPHOCYTES # BLD AUTO: 2.31 X10*3/UL (ref 0.8–3)
LYMPHOCYTES NFR BLD AUTO: 32.2 %
MCH RBC QN AUTO: 30 PG (ref 26–34)
MCHC RBC AUTO-ENTMCNC: 30.9 G/DL (ref 32–36)
MCV RBC AUTO: 97 FL (ref 80–100)
MONOCYTES # BLD AUTO: 0.46 X10*3/UL (ref 0.05–0.8)
MONOCYTES NFR BLD AUTO: 6.4 %
NEUTROPHILS # BLD AUTO: 4.23 X10*3/UL (ref 1.6–5.5)
NEUTROPHILS NFR BLD AUTO: 59 %
NRBC BLD-RTO: 0 /100 WBCS (ref 0–0)
PLATELET # BLD AUTO: 333 X10*3/UL (ref 150–450)
POTASSIUM SERPL-SCNC: 4.3 MMOL/L (ref 3.5–5.3)
RBC # BLD AUTO: 4.04 X10*6/UL (ref 4–5.2)
SODIUM SERPL-SCNC: 140 MMOL/L (ref 136–145)
T4 FREE SERPL-MCNC: 0.92 NG/DL (ref 0.61–1.12)
TSH SERPL-ACNC: 2.11 MIU/L (ref 0.44–3.98)
WBC # BLD AUTO: 7.2 X10*3/UL (ref 4.4–11.3)

## 2024-02-28 PROCEDURE — 1159F MED LIST DOCD IN RCRD: CPT | Performed by: INTERNAL MEDICINE

## 2024-02-28 PROCEDURE — 1160F RVW MEDS BY RX/DR IN RCRD: CPT | Performed by: INTERNAL MEDICINE

## 2024-02-28 PROCEDURE — 1157F ADVNC CARE PLAN IN RCRD: CPT | Performed by: INTERNAL MEDICINE

## 2024-02-28 PROCEDURE — 1036F TOBACCO NON-USER: CPT | Performed by: INTERNAL MEDICINE

## 2024-02-28 PROCEDURE — 99214 OFFICE O/P EST MOD 30 MIN: CPT | Performed by: INTERNAL MEDICINE

## 2024-02-28 PROCEDURE — 80048 BASIC METABOLIC PNL TOTAL CA: CPT

## 2024-02-28 PROCEDURE — 84439 ASSAY OF FREE THYROXINE: CPT

## 2024-02-28 PROCEDURE — 36415 COLL VENOUS BLD VENIPUNCTURE: CPT

## 2024-02-28 PROCEDURE — 85025 COMPLETE CBC W/AUTO DIFF WBC: CPT

## 2024-02-28 PROCEDURE — 1126F AMNT PAIN NOTED NONE PRSNT: CPT | Performed by: INTERNAL MEDICINE

## 2024-02-28 PROCEDURE — 84443 ASSAY THYROID STIM HORMONE: CPT

## 2024-02-28 RX ORDER — CIPROFLOXACIN 250 MG/1
250 TABLET, FILM COATED ORAL 2 TIMES DAILY
Qty: 14 TABLET | Refills: 0 | Status: SHIPPED | OUTPATIENT
Start: 2024-02-28 | End: 2024-03-06

## 2024-02-28 RX ORDER — METRONIDAZOLE 250 MG/1
250 TABLET ORAL 3 TIMES DAILY
Qty: 21 TABLET | Refills: 0 | Status: SHIPPED | OUTPATIENT
Start: 2024-02-28 | End: 2024-03-06

## 2024-02-28 ASSESSMENT — PATIENT HEALTH QUESTIONNAIRE - PHQ9
SUM OF ALL RESPONSES TO PHQ9 QUESTIONS 1 AND 2: 0
1. LITTLE INTEREST OR PLEASURE IN DOING THINGS: NOT AT ALL
2. FEELING DOWN, DEPRESSED OR HOPELESS: NOT AT ALL

## 2024-02-28 NOTE — PROGRESS NOTES
Assessment/Plan   Problem List Items Addressed This Visit       Malignant neoplasm of ascending colon (CMS/HCC)    Adult hypothyroidism    Relevant Orders    Thyroid Stimulating Hormone    T4, free    CAD (coronary artery disease)    Other specified diabetes mellitus with other specified complication, with long-term current use of insulin (CMS/HCC)    History of coronary artery stent placement    Protein-calorie malnutrition, unspecified severity (CMS/HCC)    Diarrhea - Primary    Relevant Medications    metroNIDAZOLE (Flagyl) 250 mg tablet    ciprofloxacin (Cipro) 250 mg tablet    Other Relevant Orders    CBC and Auto Differential    Basic metabolic panel    Thyroid Stimulating Hormone    T4, free    C. difficile, PCR    Stool Pathogen Panel, PCR     This is a 89-year-old female who presented with ongoing diarrhea since the surgery of hemicolectomy that was done  She has no blood in it it was watery and getting worse she had nonspecific abdominal discomfort there is no fever  It could well be associated with over replacement with thyroid medicine that she is taking  Could be infectious in origin and therefore testing as ordered  In the meantime Cipro and Flagyl is being given  Follow-up as needed 7 to 10 days time unless otherwise indicated  Coronary artery disease is stable asymptomatic  Continues to follow with the oncology service in relation to adenocarcinoma of the colon s/p resection    Subjective   Patient ID: Lupis Jackson is a 89 y.o. female who presents for Follow-up.    Past Surgical History:   Procedure Laterality Date    CATARACT EXTRACTION      COLON SURGERY      COLONOSCOPY      OTHER SURGICAL HISTORY  12/10/2018    Right mastectomy      Family History   Problem Relation Name Age of Onset    No Known Problems Mother      No Known Problems Father        Social History     Socioeconomic History    Marital status:      Spouse name: Not on file    Number of children: Not on file    Years of education:  Not on file    Highest education level: Not on file   Occupational History    Not on file   Tobacco Use    Smoking status: Former     Types: Cigarettes     Quit date:      Years since quittin.1     Passive exposure: Past    Smokeless tobacco: Never   Vaping Use    Vaping Use: Never used   Substance and Sexual Activity    Alcohol use: Never    Drug use: Never    Sexual activity: Defer   Other Topics Concern    Not on file   Social History Narrative    Not on file     Social Determinants of Health     Financial Resource Strain: Low Risk  (10/16/2023)    Overall Financial Resource Strain ([facility])     Difficulty of Paying Living Expenses: Not hard at all   Food Insecurity: Not on file   Transportation Needs: No Transportation Needs (2023)    OASIS : Transportation     Lack of Transportation (Medical): No     Lack of Transportation (Non-Medical): No     Patient Unable or Declines to Respond: No   Physical Activity: Not on file   Stress: Not on file   Social Connections: Feeling Socially Integrated (2023)    OASIS : Social Isolation     Frequency of experiencing loneliness or isolation: Rarely   Intimate Partner Violence: Not on file   Housing Stability: Low Risk  (10/16/2023)    Housing Stability Vital Sign     Unable to Pay for Housing in the Last Year: No     Number of Places Lived in the Last Year: 1     Unstable Housing in the Last Year: No      Patient has no known allergies.   Current Outpatient Medications   Medication Sig Dispense Refill    aspirin 81 mg EC tablet Take 1 tablet (81 mg) by mouth once daily.      atorvastatin (Lipitor) 10 mg tablet Take 1 tablet (10 mg) by mouth once daily in the evening.      cholecalciferol (Vitamin D-3) 50 MCG (2000 UT) tablet Take 1 tablet (2,000 Units) by mouth once daily.      insulin glargine (Lantus U-100 Insulin) 100 unit/mL injection Inject 6 Units under the skin once daily at bedtime.      levothyroxine (Synthroid, Levoxyl) 112 mcg tablet  "TAKE ONE TABLET BY MOUTH DAILY 90 tablet 3    meclizine (Antivert) 12.5 mg tablet TAKE ONE TABLET BY MOUTH EVERY 12 HOURS AS NEEDED 40 tablet 0    mv-calcium-min-iron fm-FA-vitK (One-A-Day Women's Complete,vK,) 18 mg-400 mcg- 25 mcg tablet Take 1 tablet by mouth once daily.      nut.tx.gluc intol,lf,soy-fiber (Glucerna 1.5 Maynor) 0.08-1.5 gram-kcal/mL liquid Take 237 mL by mouth 2 times daily after breakfast and lunch. 4740 mL 3    PARoxetine (Paxil) 20 mg tablet take 1 and 1/2 tablets by mouth daily. 135 tablet 0    pen needle, diabetic (Sure Comfort Pen Needle) 32 gauge x 5/32\" needle USE ONE EVERY  each 2    ciprofloxacin (Cipro) 250 mg tablet Take 1 tablet (250 mg) by mouth 2 times a day for 7 days. 14 tablet 0    metroNIDAZOLE (Flagyl) 250 mg tablet Take 1 tablet (250 mg) by mouth 3 times a day for 7 days. 21 tablet 0    pantoprazole (ProtoNix) 40 mg EC tablet Take 1 tablet (40 mg) by mouth once daily. Do not crush, chew, or split. 30 tablet 1     No current facility-administered medications for this visit.      Vitals:    02/28/24 1336   BP: 144/59   BP Location: Left arm   Patient Position: Sitting   Pulse: (!) 43   Temp: 36.6 °C (97.9 °F)   Weight: 53.9 kg (118 lb 12.8 oz)   Height: 1.676 m (5' 6\")      Problem List Items Addressed This Visit       Malignant neoplasm of ascending colon (CMS/HCC)    Adult hypothyroidism    Relevant Orders    Thyroid Stimulating Hormone    T4, free    CAD (coronary artery disease)    Other specified diabetes mellitus with other specified complication, with long-term current use of insulin (CMS/HCC)    History of coronary artery stent placement    Protein-calorie malnutrition, unspecified severity (CMS/HCC)    Diarrhea - Primary    Relevant Medications    metroNIDAZOLE (Flagyl) 250 mg tablet    ciprofloxacin (Cipro) 250 mg tablet    Other Relevant Orders    CBC and Auto Differential    Basic metabolic panel    Thyroid Stimulating Hormone    T4, free    C. difficile, PCR    " "Stool Pathogen Panel, PCR      Orders Placed This Encounter   Procedures    C. difficile, PCR     Order Specific Question:   Release result to MyChart     Answer:   Immediate [1]    Stool Pathogen Panel, PCR     Order Specific Question:   Release result to MyChart     Answer:   Immediate [1]    CBC and Auto Differential     Standing Status:   Future     Standing Expiration Date:   2/28/2025     Order Specific Question:   Release result to MyChart     Answer:   Immediate    Basic metabolic panel     Standing Status:   Future     Standing Expiration Date:   2/28/2025     Order Specific Question:   Release result to MyChart     Answer:   Immediate [1]    Thyroid Stimulating Hormone     Standing Status:   Future     Standing Expiration Date:   2/28/2025     Order Specific Question:   Release result to MyChart     Answer:   Immediate    T4, free     Standing Status:   Future     Standing Expiration Date:   2/28/2025     Order Specific Question:   Release result to MyChart     Answer:   Immediate [1]        HPI  This 89-year-old female very pleasant apparently had hemicolectomy for colon cancer and has been having diarrhea since then she says but it was not bad now it is getting watery and worse she had no abdominal pain she has no nausea or vomiting  She takes medication for thyroid  She says her diabetes is under control  She does have some left-sided discomfort which has been told due to the ribs or musculoskeletal pain by Dr. Otto CHAN otherwise negative  Past medical history reviewed  Social and family history reviewed  Allergies and medications reviewed  Recent labs reviewed  Vital signs reviewed    PHYSICAL EXAM  Abdomen soft nontender  Neuro awake and alert    No results found for: \"PR1\", \"BMPR1A\", \"CMPLAS\", \"SU4EGEZA\", \"KPSAT\"   Lab Results   Component Value Date    CHOL 136 02/02/2023    CHHDL 2.8 02/02/2023                "

## 2024-02-29 ENCOUNTER — TELEPHONE (OUTPATIENT)
Dept: PRIMARY CARE | Facility: CLINIC | Age: 89
End: 2024-02-29

## 2024-02-29 ENCOUNTER — APPOINTMENT (OUTPATIENT)
Dept: PODIATRY | Facility: CLINIC | Age: 89
End: 2024-02-29
Payer: MEDICARE

## 2024-02-29 ENCOUNTER — LAB (OUTPATIENT)
Dept: LAB | Facility: LAB | Age: 89
End: 2024-02-29
Payer: MEDICARE

## 2024-02-29 PROCEDURE — 87506 IADNA-DNA/RNA PROBE TQ 6-11: CPT

## 2024-02-29 PROCEDURE — 87493 C DIFF AMPLIFIED PROBE: CPT

## 2024-02-29 NOTE — TELEPHONE ENCOUNTER
T/c to pt, spoke with pt, informed pt of lab results, glucose 227, pt was made aware, she verbalized her understanding,she has no questions at this time.

## 2024-03-01 DIAGNOSIS — R19.7 DIARRHEA, UNSPECIFIED TYPE: Primary | ICD-10-CM

## 2024-03-01 LAB
C COLI+JEJ+UPSA DNA STL QL NAA+PROBE: NOT DETECTED
C DIF TOX TCDA+TCDB STL QL NAA+PROBE: NOT DETECTED
EC STX1 GENE STL QL NAA+PROBE: NOT DETECTED
EC STX2 GENE STL QL NAA+PROBE: NOT DETECTED
NOROVIRUS GI + GII RNA STL NAA+PROBE: NOT DETECTED
RV RNA STL NAA+PROBE: NOT DETECTED
SALMONELLA DNA STL QL NAA+PROBE: NOT DETECTED
SHIGELLA DNA SPEC QL NAA+PROBE: NOT DETECTED
V CHOLERAE DNA STL QL NAA+PROBE: NOT DETECTED
Y ENTEROCOL DNA STL QL NAA+PROBE: NOT DETECTED

## 2024-03-01 NOTE — RESULT ENCOUNTER NOTE
Spoke with the patient and explained that stool for C. difficile and culture is negative and if the diarrhea do not resolve see the gastroenterologist referral in the chart

## 2024-03-20 DIAGNOSIS — F32.A DEPRESSION, UNSPECIFIED DEPRESSION TYPE: ICD-10-CM

## 2024-03-20 DIAGNOSIS — R10.12 LEFT UPPER QUADRANT ABDOMINAL PAIN: ICD-10-CM

## 2024-03-21 ENCOUNTER — PROCEDURE VISIT (OUTPATIENT)
Dept: PODIATRY | Facility: CLINIC | Age: 89
End: 2024-03-21
Payer: MEDICARE

## 2024-03-21 DIAGNOSIS — B35.1 FUNGAL NAIL INFECTION: Primary | ICD-10-CM

## 2024-03-21 DIAGNOSIS — Z79.4 OTHER SPECIFIED DIABETES MELLITUS WITH OTHER SPECIFIED COMPLICATION, WITH LONG-TERM CURRENT USE OF INSULIN (MULTI): ICD-10-CM

## 2024-03-21 DIAGNOSIS — M79.675 PAIN IN TOES OF BOTH FEET: ICD-10-CM

## 2024-03-21 DIAGNOSIS — M79.674 PAIN IN TOES OF BOTH FEET: ICD-10-CM

## 2024-03-21 DIAGNOSIS — E13.69 OTHER SPECIFIED DIABETES MELLITUS WITH OTHER SPECIFIED COMPLICATION, WITH LONG-TERM CURRENT USE OF INSULIN (MULTI): ICD-10-CM

## 2024-03-21 PROCEDURE — 11721 DEBRIDE NAIL 6 OR MORE: CPT | Performed by: PODIATRIST

## 2024-03-21 RX ORDER — PANTOPRAZOLE SODIUM 40 MG/1
40 TABLET, DELAYED RELEASE ORAL DAILY
Qty: 90 TABLET | Refills: 1 | Status: SHIPPED | OUTPATIENT
Start: 2024-03-21

## 2024-03-21 RX ORDER — PAROXETINE HYDROCHLORIDE 20 MG/1
TABLET, FILM COATED ORAL
Qty: 135 TABLET | Refills: 1 | Status: SHIPPED | OUTPATIENT
Start: 2024-03-21

## 2024-03-21 NOTE — PROGRESS NOTES
History Of Present Illness  Lupis Jackson is a 89 y.o. female presenting for diabetic nail care. Patient complains with painful elongated nails.     Past Medical History  She has a past medical history of Abnormal mammogram (10/09/2023), Breast cancer (CMS/[facility]), Cataract, Colorectal cancer (CMS/[facility]), Coronary artery disease, Dizziness, Essential (primary) hypertension (04/07/2022), HL (hearing loss), Hyperlipidemia, unspecified (07/26/2021), Hypothyroidism, Myocardial infarction (CMS/[facility]), Presence of coronary angioplasty implant and graft (07/27/2020), Type 2 diabetes mellitus without complications (CMS/[facility]) (12/15/2022), and Vitamin D deficiency, unspecified (04/22/2019).    Surgical History  She has a past surgical history that includes Other surgical history (12/10/2018); Colonoscopy; Cataract extraction; and Colon surgery.     Social History  She reports that she quit smoking about 48 years ago. Her smoking use included cigarettes. She has been exposed to tobacco smoke. She has never used smokeless tobacco. She reports that she does not drink alcohol and does not use drugs.    Family History  Family History   Problem Relation Name Age of Onset    No Known Problems Mother      No Known Problems Father          Allergies  Patient has no known allergies.    Medications  Current Outpatient Medications   Medication Sig Dispense Refill    aspirin 81 mg EC tablet Take 1 tablet (81 mg) by mouth once daily.      atorvastatin (Lipitor) 10 mg tablet Take 1 tablet (10 mg) by mouth once daily in the evening.      cholecalciferol (Vitamin D-3) 50 MCG (2000 UT) tablet Take 1 tablet (2,000 Units) by mouth once daily.      insulin glargine (Lantus U-100 Insulin) 100 unit/mL injection Inject 6 Units under the skin once daily at bedtime.      levothyroxine (Synthroid, Levoxyl) 112 mcg tablet TAKE ONE TABLET BY MOUTH DAILY 90 tablet 3    meclizine (Antivert) 12.5 mg tablet TAKE ONE TABLET BY MOUTH EVERY 12 HOURS AS NEEDED 40 tablet 0     "mv-calcium-min-iron fm-FA-vitK (One-A-Day Women's Complete,vK,) 18 mg-400 mcg- 25 mcg tablet Take 1 tablet by mouth once daily.      nut.tx.gluc intol,lf,soy-fiber (Glucerna 1.5 Maynor) 0.08-1.5 gram-kcal/mL liquid Take 237 mL by mouth 2 times daily after breakfast and lunch. 4740 mL 3    PARoxetine (Paxil) 20 mg tablet take 1 and 1/2 tablets by mouth daily. 135 tablet 0    pen needle, diabetic (Sure Comfort Pen Needle) 32 gauge x 5/32\" needle USE ONE EVERY  each 2    pantoprazole (ProtoNix) 40 mg EC tablet Take 1 tablet (40 mg) by mouth once daily. Do not crush, chew, or split. 30 tablet 1     No current facility-administered medications for this visit.       Review of Systems    REVIEW OF SYSTEMS  GENERAL:  Negative for malaise, significant weight loss, fever  CARDIOVASCULAR: leg swelling   MUSCULOSKELETAL:  Negative for joint pain or swelling, back pain, and muscle pain.  SKIN:  Negative for lesions, rash, and itching  PSYCH:  Negative for sleep disturbance, mood disorder and recent psychosocial stressors  NEURO: Negative, denies any burning, tingling or numbness     Objective:   Vasc: DP and PT pulses are palpable bilateral.  CFT is less than 3 seconds bilateral.  Skin temperature is warm to cool proximal to distal bilateral.      Neuro:  Light touch is intact to the foot bilateral.      Derm: Nails 1-5 bilateral are thickened, elongated and crumbly with subungual debris. Skin is supple with normal texture and turgor noted.  Webspaces are clean, dry and intact bilateral.  There are no hyperkeratoses, ulcerations, verruca or other lesions noted.      Ortho: Muscle strength is 5/5 for all pedal groups tested.  Ankle joint, subtalar joint, 1st MPJ and lesser MPJ ROM is full and without pain or crepitus.  The foot type is rectus bilateral off weight bearing.  There are no structural deformities noted.  Aching knee and hip pain   Assessment/Plan   Knee pain - refer to sports med   Painful nail " mycosis  DM    Toenails are debrided in length and thickness to avoid infection and for pain relief  Nellie Gutierrez-Valorie, YUN  [address] [place] [address]

## 2024-04-02 ENCOUNTER — APPOINTMENT (OUTPATIENT)
Dept: PRIMARY CARE | Facility: CLINIC | Age: 89
End: 2024-04-02
Payer: MEDICARE

## 2024-04-10 ENCOUNTER — OFFICE VISIT (OUTPATIENT)
Dept: ORTHOPEDIC SURGERY | Facility: CLINIC | Age: 89
End: 2024-04-10
Payer: MEDICARE

## 2024-04-10 ENCOUNTER — HOSPITAL ENCOUNTER (OUTPATIENT)
Dept: RADIOLOGY | Facility: CLINIC | Age: 89
Discharge: HOME | End: 2024-04-10
Payer: MEDICARE

## 2024-04-10 ENCOUNTER — HOSPITAL ENCOUNTER (OUTPATIENT)
Dept: RADIOLOGY | Facility: EXTERNAL LOCATION | Age: 89
Discharge: HOME | End: 2024-04-10

## 2024-04-10 DIAGNOSIS — M25.561 RIGHT KNEE PAIN, UNSPECIFIED CHRONICITY: ICD-10-CM

## 2024-04-10 DIAGNOSIS — M17.11 PRIMARY OSTEOARTHRITIS OF RIGHT KNEE: ICD-10-CM

## 2024-04-10 DIAGNOSIS — M25.551 RIGHT HIP PAIN: ICD-10-CM

## 2024-04-10 DIAGNOSIS — M16.11 PRIMARY OSTEOARTHRITIS OF RIGHT HIP: Primary | ICD-10-CM

## 2024-04-10 PROCEDURE — 73564 X-RAY EXAM KNEE 4 OR MORE: CPT | Mod: RT

## 2024-04-10 PROCEDURE — 1157F ADVNC CARE PLAN IN RCRD: CPT | Performed by: EMERGENCY MEDICINE

## 2024-04-10 PROCEDURE — 73502 X-RAY EXAM HIP UNI 2-3 VIEWS: CPT | Mod: LEFT SIDE | Performed by: RADIOLOGY

## 2024-04-10 PROCEDURE — 1036F TOBACCO NON-USER: CPT | Performed by: EMERGENCY MEDICINE

## 2024-04-10 PROCEDURE — 73502 X-RAY EXAM HIP UNI 2-3 VIEWS: CPT | Mod: LT

## 2024-04-10 PROCEDURE — 73564 X-RAY EXAM KNEE 4 OR MORE: CPT | Mod: RIGHT SIDE | Performed by: RADIOLOGY

## 2024-04-10 PROCEDURE — 99205 OFFICE O/P NEW HI 60 MIN: CPT | Performed by: EMERGENCY MEDICINE

## 2024-04-10 PROCEDURE — 1159F MED LIST DOCD IN RCRD: CPT | Performed by: EMERGENCY MEDICINE

## 2024-04-10 PROCEDURE — 1160F RVW MEDS BY RX/DR IN RCRD: CPT | Performed by: EMERGENCY MEDICINE

## 2024-04-10 PROCEDURE — 20611 DRAIN/INJ JOINT/BURSA W/US: CPT | Performed by: EMERGENCY MEDICINE

## 2024-04-10 RX ORDER — LIDOCAINE HYDROCHLORIDE 10 MG/ML
4 INJECTION INFILTRATION; PERINEURAL
Status: COMPLETED | OUTPATIENT
Start: 2024-04-10 | End: 2024-04-10

## 2024-04-10 RX ORDER — METHYLPREDNISOLONE ACETATE 40 MG/ML
80 INJECTION, SUSPENSION INTRA-ARTICULAR; INTRALESIONAL; INTRAMUSCULAR; SOFT TISSUE
Status: COMPLETED | OUTPATIENT
Start: 2024-04-10 | End: 2024-04-10

## 2024-04-10 RX ADMIN — LIDOCAINE HYDROCHLORIDE 4 ML: 10 INJECTION INFILTRATION; PERINEURAL at 15:33

## 2024-04-10 RX ADMIN — METHYLPREDNISOLONE ACETATE 80 MG: 40 INJECTION, SUSPENSION INTRA-ARTICULAR; INTRALESIONAL; INTRAMUSCULAR; SOFT TISSUE at 15:33

## 2024-04-10 ASSESSMENT — PAIN DESCRIPTION - DESCRIPTORS
DESCRIPTORS: ACHING
DESCRIPTORS: ACHING

## 2024-04-10 ASSESSMENT — PAIN - FUNCTIONAL ASSESSMENT: PAIN_FUNCTIONAL_ASSESSMENT: 0-10

## 2024-04-10 ASSESSMENT — PAIN SCALES - GENERAL: PAINLEVEL_OUTOF10: 10 - WORST POSSIBLE PAIN

## 2024-04-10 NOTE — PROGRESS NOTES
Subjective    Patient ID: Lupis Jackson is a 89 y.o. female.    Chief Complaint: Pain of the Right Knee (NPV - Right knee pain no injury. ) and Pain of the Left Hip (No injury.)     Last Surgery: No surgery found  Last Surgery Date: No surgery found    Lupis is an extremely pleasant, very healthy, 89-year-old female coming in with some acute on chronic pain in her bilateral hips and right knee.  She states that she has had pain for years but that her pain has been much worse for the past 6 months.  She was actually referred here by her podiatrist Dr. Gutierrez for nonoperative evaluation and management.  Most of her pain here today is located in the right groin area and radiates laterally.  She also has pain radiating down toward and involving her right knee.  She has some mild discomfort in her left groin area that is worse with activity.  Overall she is doing very well and says that she takes a combination of Excedrin and Tylenol on an occasional basis that do both help with her symptoms.  She has never had a cortisone injection and at 89 years old does not want surgery.        Objective   Right Knee Exam     Muscle Strength   The patient has normal right knee strength.    Tenderness   The patient is experiencing no tenderness.     Range of Motion   The patient has normal right knee ROM.    Other   Erythema: absent  Sensation: normal  Pulse: present  Swelling: none  Effusion: no effusion present    Comments:  Mild patellar crepitus with knee range of motion testing.      Left Knee Exam   Left knee exam is normal.      Right Hip Exam     Tenderness   The patient is experiencing no tenderness.     Range of Motion   External rotation:  abnormal   Internal rotation:  abnormal     Muscle Strength   The patient has normal right hip strength.    Tests   ART: positive    Other   Erythema: absent  Sensation: normal  Pulse: present    Comments:  Positive FADIR, logroll, and ART.  No tenderness to palpation over the greater  trochanter.      Left Hip Exam     Tenderness   The patient is experiencing no tenderness.     Range of Motion   The patient has normal left hip ROM.    Muscle Strength   The patient has normal left hip strength.     Tests   ART: negative    Other   Sensation: normal  Pulse: present            Image Results:  X-rays of the right knee, left hip and pelvis reviewed mild to moderate DJD in the right knee and left hip joints with severe DJD of the right hip joint without any evidence of acute injuries or fractures.    Patient ID: Lupis Jackson is a 89 y.o. female.    L Inj/Asp: R hip joint on 4/10/2024 3:33 PM  Indications: pain  Details: 20 G needle, ultrasound-guided anterior approach  Medications: 80 mg methylPREDNISolone acetate 40 mg/mL; 4 mL lidocaine 10 mg/mL (1 %)  Outcome: tolerated well, no immediate complications  Procedure, treatment alternatives, risks and benefits explained, specific risks discussed. Consent was given by the patient. Immediately prior to procedure a time out was called to verify the correct patient, procedure, equipment, support staff and site/side marked as required. Patient was prepped and draped in the usual sterile fashion.           Assessment/Plan   Encounter Diagnoses:  Primary osteoarthritis of right hip    Right hip pain    Right knee pain, unspecified chronicity    Primary osteoarthritis of right knee    Orders Placed This Encounter    L Inj/Asp    XR knee right 4+ views    Point of Care Ultrasound     No follow-ups on file.    We discussed her treatment options and agreed for her to begin using prescription dose Tylenol at 1000 mg up to 3 times daily as needed for her pain and also agreed to perform an ultrasound-guided cortisone injection of her right hip joint here today in the office.  She tolerated the procedure well without any complications.  She is a  diabetic so she knows to monitor her sugars over the next week or so after this injection.  Activity modifications were  reviewed.  She will follow-up with me in about 2 weeks to determine her response to this plan and whether or not she needs a left hip and or right knee injection.    ** Please excuse any errors in grammar or translation related to this dictation. Voice recognition software was utilized to prepare this document. **       Rick Whitlock MD  [facility]

## 2024-04-24 ENCOUNTER — OFFICE VISIT (OUTPATIENT)
Dept: ORTHOPEDIC SURGERY | Facility: CLINIC | Age: 89
End: 2024-04-24
Payer: MEDICARE

## 2024-04-24 DIAGNOSIS — M16.11 PRIMARY OSTEOARTHRITIS OF RIGHT HIP: Primary | ICD-10-CM

## 2024-04-24 PROCEDURE — 1125F AMNT PAIN NOTED PAIN PRSNT: CPT | Performed by: EMERGENCY MEDICINE

## 2024-04-24 PROCEDURE — 99213 OFFICE O/P EST LOW 20 MIN: CPT | Performed by: EMERGENCY MEDICINE

## 2024-04-24 PROCEDURE — 1159F MED LIST DOCD IN RCRD: CPT | Performed by: EMERGENCY MEDICINE

## 2024-04-24 PROCEDURE — 1160F RVW MEDS BY RX/DR IN RCRD: CPT | Performed by: EMERGENCY MEDICINE

## 2024-04-24 PROCEDURE — 1157F ADVNC CARE PLAN IN RCRD: CPT | Performed by: EMERGENCY MEDICINE

## 2024-04-24 PROCEDURE — 1036F TOBACCO NON-USER: CPT | Performed by: EMERGENCY MEDICINE

## 2024-04-24 ASSESSMENT — PAIN SCALES - GENERAL: PAINLEVEL_OUTOF10: 4

## 2024-04-24 ASSESSMENT — PAIN - FUNCTIONAL ASSESSMENT: PAIN_FUNCTIONAL_ASSESSMENT: 0-10

## 2024-04-24 ASSESSMENT — PAIN DESCRIPTION - DESCRIPTORS: DESCRIPTORS: DULL

## 2024-04-24 NOTE — PROGRESS NOTES
Subjective    Patient ID: Lupis Jackson is a 89 y.o. female.    Chief Complaint: Follow-up of the Right Hip (CSI on 4/10/23 stats hip feels better still has pain but not as bad as it was. )     Last Surgery: No surgery found  Last Surgery Date: No surgery found    Lupis is an extremely pleasant, very healthy, 89-year-old female coming in with some acute on chronic pain in her bilateral hips and right knee.  She states that she has had pain for years but that her pain has been much worse for the past 6 months.  She was actually referred here by her podiatrist Dr. Gutierrez for nonoperative evaluation and management.  Most of her pain here today is located in the right groin area and radiates laterally.  She also has pain radiating down toward and involving her right knee.  She has some mild discomfort in her left groin area that is worse with activity.  Overall she is doing very well and says that she takes a combination of Excedrin and Tylenol on an occasional basis that do both help with her symptoms.  She has never had a cortisone injection and at 89 years old does not want surgery. We agreed for her to begin using prescription dose Tylenol at 1000 mg up to 3 times daily as needed for her pain and also agreed to perform an ultrasound-guided cortisone injection of her right hip joint here today in the office.  She tolerated the procedure well without any complications.  She is a  diabetic so she knows to monitor her sugars over the next week or so after this injection.  Activity modifications were reviewed.  She will follow-up with me in about 2 weeks to determine her response to this plan and whether or not she needs a left hip and or right knee injection.    Update on 4/24/24. Lupis is coming back in for a follow up visit. She is about 2w out from a R hip CSI and is about 50% better and is very pleased that her pain has improved. No other complaints here today. Not having pain in L hip or R knee.         Objective   Right  Knee Exam     Muscle Strength   The patient has normal right knee strength.    Tenderness   The patient is experiencing no tenderness.     Range of Motion   The patient has normal right knee ROM.    Other   Erythema: absent  Sensation: normal  Pulse: present  Swelling: none  Effusion: no effusion present    Comments:  Mild patellar crepitus with knee range of motion testing.      Left Knee Exam   Left knee exam is normal.      Right Hip Exam     Tenderness   The patient is experiencing no tenderness.     Range of Motion   External rotation:  abnormal   Internal rotation:  abnormal     Muscle Strength   The patient has normal right hip strength.    Tests   ART: positive    Other   Erythema: absent  Sensation: normal  Pulse: present    Comments:  Positive FADIR, logroll, and ART.  No tenderness to palpation over the greater trochanter.      Left Hip Exam     Tenderness   The patient is experiencing no tenderness.     Range of Motion   The patient has normal left hip ROM.    Muscle Strength   The patient has normal left hip strength.     Tests   ART: negative    Other   Sensation: normal  Pulse: present            Image Results:  No new images      Assessment/Plan   Encounter Diagnoses:  No diagnosis found.    No orders of the defined types were placed in this encounter.    No follow-ups on file.    We discussed her treatment options and agreed for her to continue using prescription dose Tylenol at 1000 mg up to 3 times daily as needed for her pain. She will follow up with me in about 2mo for likely repeat R hip CSI.     Of note, she is still very active and yesterday was helping her neighbor cut down a small tree.     ** Please excuse any errors in grammar or translation related to this dictation. Voice recognition software was utilized to prepare this document. **       Rick Whitlock MD  [facility]

## 2024-05-23 ENCOUNTER — PROCEDURE VISIT (OUTPATIENT)
Dept: PODIATRY | Facility: CLINIC | Age: 89
End: 2024-05-23
Payer: MEDICARE

## 2024-05-23 DIAGNOSIS — E13.69 OTHER SPECIFIED DIABETES MELLITUS WITH OTHER SPECIFIED COMPLICATION, WITH LONG-TERM CURRENT USE OF INSULIN (MULTI): Primary | ICD-10-CM

## 2024-05-23 DIAGNOSIS — B35.1 FUNGAL NAIL INFECTION: ICD-10-CM

## 2024-05-23 DIAGNOSIS — M79.674 PAIN IN TOES OF BOTH FEET: ICD-10-CM

## 2024-05-23 DIAGNOSIS — M79.675 PAIN IN TOES OF BOTH FEET: ICD-10-CM

## 2024-05-23 DIAGNOSIS — Z79.4 OTHER SPECIFIED DIABETES MELLITUS WITH OTHER SPECIFIED COMPLICATION, WITH LONG-TERM CURRENT USE OF INSULIN (MULTI): Primary | ICD-10-CM

## 2024-05-23 PROCEDURE — 11721 DEBRIDE NAIL 6 OR MORE: CPT | Performed by: PODIATRIST

## 2024-05-23 NOTE — PROGRESS NOTES
History Of Present Illness  Lupis Jackson is a 89 y.o. female presenting for diabetic nail care. Patient complains with painful elongated nails.  PCP Dr Menjivar, last visit 2/29/24     Past Medical History  She has a past medical history of Abnormal mammogram (10/09/2023), Breast cancer (Multi), Cataract, Colorectal cancer (Multi), Coronary artery disease, Dizziness, Essential (primary) hypertension (04/07/2022), HL (hearing loss), Hyperlipidemia, unspecified (07/26/2021), Hypothyroidism, Myocardial infarction (Multi), Presence of coronary angioplasty implant and graft (07/27/2020), Type 2 diabetes mellitus without complications (Multi) (12/15/2022), and Vitamin D deficiency, unspecified (04/22/2019).    Surgical History  She has a past surgical history that includes Other surgical history (12/10/2018); Colonoscopy; Cataract extraction; and Colon surgery.     Social History  She reports that she quit smoking about 48 years ago. Her smoking use included cigarettes. She has been exposed to tobacco smoke. She has never used smokeless tobacco. She reports that she does not drink alcohol and does not use drugs.    Family History  Family History   Problem Relation Name Age of Onset    No Known Problems Mother      No Known Problems Father          Allergies  Patient has no known allergies.    Medications  Current Outpatient Medications   Medication Sig Dispense Refill    aspirin 81 mg EC tablet Take 1 tablet (81 mg) by mouth once daily.      atorvastatin (Lipitor) 10 mg tablet Take 1 tablet (10 mg) by mouth once daily in the evening.      cholecalciferol (Vitamin D-3) 50 MCG (2000 UT) tablet Take 1 tablet (2,000 Units) by mouth once daily.      insulin glargine (Lantus U-100 Insulin) 100 unit/mL injection Inject 6 Units under the skin once daily at bedtime.      Lantus Solostar U-100 Insulin 100 unit/mL (3 mL) pen INJECT 8 UNITS SUBCUTANEOUSLY AT BEDTIME 9 mL 0    levothyroxine (Synthroid, Levoxyl) 112 mcg tablet TAKE ONE TABLET  "BY MOUTH DAILY 90 tablet 3    meclizine (Antivert) 12.5 mg tablet TAKE ONE TABLET BY MOUTH EVERY 12 HOURS AS NEEDED 40 tablet 0    mv-calcium-min-iron fm-FA-vitK (One-A-Day Women's Complete,vK,) 18 mg-400 mcg- 25 mcg tablet Take 1 tablet by mouth once daily.      nut.tx.gluc intol,lf,soy-fiber (Glucerna 1.5 Maynor) 0.08-1.5 gram-kcal/mL liquid Take 237 mL by mouth 2 times daily after breakfast and lunch. 4740 mL 3    pantoprazole (ProtoNix) 40 mg EC tablet TAKE ONE TABLET BY MOUTH ONCE DAILY. DO NOT CRUSH, CHEW OR SPLIT 90 tablet 1    PARoxetine (Paxil) 20 mg tablet TAKE 1 & 1/2 TABLETS BY MOUTH DAILY 135 tablet 1    pen needle, diabetic (Sure Comfort Pen Needle) 32 gauge x 5/32\" needle USE ONE EVERY  each 2     No current facility-administered medications for this visit.       Review of Systems    REVIEW OF SYSTEMS  GENERAL:  Negative for malaise, significant weight loss, fever  CARDIOVASCULAR: leg swelling   MUSCULOSKELETAL:  Negative for joint pain or swelling, back pain, and muscle pain.  SKIN:  Negative for lesions, rash, and itching  PSYCH:  Negative for sleep disturbance, mood disorder and recent psychosocial stressors  NEURO: Negative, denies any burning, tingling or numbness     Objective:   Vasc: DP and PT pulses are palpable bilateral.  CFT is less than 3 seconds bilateral.  Skin temperature is warm to cool proximal to distal bilateral.  No edema     Neuro:  Light touch is intact to the foot bilateral.      Derm: Nails 1-5 bilateral are thickened, elongated and crumbly with subungual debris. Skin is supple with normal texture and turgor noted.  Webspaces are clean, dry and intact bilateral.  There are no hyperkeratoses, ulcerations, verruca or other lesions noted.      Ortho: Muscle strength is 5/5 for all pedal groups tested.  Ankle joint, subtalar joint, 1st MPJ and lesser MPJ ROM is full and without pain or crepitus.  The foot type is rectus bilateral off weight bearing.  There are no structural " deformities noted.  Aching knee and hip pain   Assessment/Plan   Knee pain -PT per Sports Med  Painful nail mycosis  DM    Toenails are debrided in length and thickness to avoid infection and for pain relief  Nellie Gutierrez-YUN Eugene  [address] [place] [address]

## 2024-06-04 ENCOUNTER — OFFICE VISIT (OUTPATIENT)
Dept: PRIMARY CARE | Facility: CLINIC | Age: 89
End: 2024-06-04
Payer: MEDICARE

## 2024-06-04 ENCOUNTER — LAB (OUTPATIENT)
Dept: LAB | Facility: LAB | Age: 89
End: 2024-06-04
Payer: MEDICARE

## 2024-06-04 VITALS
HEART RATE: 80 BPM | DIASTOLIC BLOOD PRESSURE: 66 MMHG | WEIGHT: 122 LBS | HEIGHT: 61 IN | SYSTOLIC BLOOD PRESSURE: 128 MMHG | BODY MASS INDEX: 23.03 KG/M2

## 2024-06-04 DIAGNOSIS — E13.69 OTHER SPECIFIED DIABETES MELLITUS WITH OTHER SPECIFIED COMPLICATION, WITH LONG-TERM CURRENT USE OF INSULIN (MULTI): ICD-10-CM

## 2024-06-04 DIAGNOSIS — N18.31 STAGE 3A CHRONIC KIDNEY DISEASE (MULTI): ICD-10-CM

## 2024-06-04 DIAGNOSIS — E55.9 VITAMIN D DEFICIENCY: ICD-10-CM

## 2024-06-04 DIAGNOSIS — Z00.00 ROUTINE GENERAL MEDICAL EXAMINATION AT HEALTH CARE FACILITY: Primary | ICD-10-CM

## 2024-06-04 DIAGNOSIS — E78.2 MIXED HYPERLIPIDEMIA: ICD-10-CM

## 2024-06-04 DIAGNOSIS — M41.35 THORACOGENIC SCOLIOSIS OF THORACOLUMBAR REGION: ICD-10-CM

## 2024-06-04 DIAGNOSIS — Z79.4 OTHER SPECIFIED DIABETES MELLITUS WITH OTHER SPECIFIED COMPLICATION, WITH LONG-TERM CURRENT USE OF INSULIN (MULTI): ICD-10-CM

## 2024-06-04 DIAGNOSIS — C18.2 MALIGNANT NEOPLASM OF ASCENDING COLON (MULTI): ICD-10-CM

## 2024-06-04 DIAGNOSIS — Z13.29 SCREENING FOR THYROID DISORDER: ICD-10-CM

## 2024-06-04 DIAGNOSIS — Z66 DNR NO CODE (DO NOT RESUSCITATE): ICD-10-CM

## 2024-06-04 DIAGNOSIS — R19.7 DIARRHEA, UNSPECIFIED TYPE: ICD-10-CM

## 2024-06-04 LAB
25(OH)D3 SERPL-MCNC: 36 NG/ML (ref 30–100)
ALBUMIN SERPL BCP-MCNC: 4.1 G/DL (ref 3.4–5)
ALP SERPL-CCNC: 98 U/L (ref 33–136)
ALT SERPL W P-5'-P-CCNC: 11 U/L (ref 7–45)
ANION GAP SERPL CALC-SCNC: 13 MMOL/L (ref 10–20)
AST SERPL W P-5'-P-CCNC: 14 U/L (ref 9–39)
BASOPHILS # BLD AUTO: 0.03 X10*3/UL (ref 0–0.1)
BASOPHILS NFR BLD AUTO: 0.4 %
BILIRUB SERPL-MCNC: 0.3 MG/DL (ref 0–1.2)
BUN SERPL-MCNC: 22 MG/DL (ref 6–23)
CALCIUM SERPL-MCNC: 10.5 MG/DL (ref 8.6–10.3)
CEA SERPL-MCNC: 2.7 UG/L
CHLORIDE SERPL-SCNC: 102 MMOL/L (ref 98–107)
CO2 SERPL-SCNC: 29 MMOL/L (ref 21–32)
CREAT SERPL-MCNC: 0.81 MG/DL (ref 0.5–1.05)
EGFRCR SERPLBLD CKD-EPI 2021: 66 ML/MIN/1.73M*2
EOSINOPHIL # BLD AUTO: 0.15 X10*3/UL (ref 0–0.4)
EOSINOPHIL NFR BLD AUTO: 2.2 %
ERYTHROCYTE [DISTWIDTH] IN BLOOD BY AUTOMATED COUNT: 13.2 % (ref 11.5–14.5)
EST. AVERAGE GLUCOSE BLD GHB EST-MCNC: 157 MG/DL
GLUCOSE SERPL-MCNC: 182 MG/DL (ref 74–99)
HBA1C MFR BLD: 7.1 %
HCT VFR BLD AUTO: 39 % (ref 36–46)
HGB BLD-MCNC: 12.1 G/DL (ref 12–16)
IMM GRANULOCYTES # BLD AUTO: 0.01 X10*3/UL (ref 0–0.5)
IMM GRANULOCYTES NFR BLD AUTO: 0.1 % (ref 0–0.9)
LYMPHOCYTES # BLD AUTO: 1.97 X10*3/UL (ref 0.8–3)
LYMPHOCYTES NFR BLD AUTO: 29 %
MCH RBC QN AUTO: 31.1 PG (ref 26–34)
MCHC RBC AUTO-ENTMCNC: 31 G/DL (ref 32–36)
MCV RBC AUTO: 100 FL (ref 80–100)
MONOCYTES # BLD AUTO: 0.46 X10*3/UL (ref 0.05–0.8)
MONOCYTES NFR BLD AUTO: 6.8 %
NEUTROPHILS # BLD AUTO: 4.17 X10*3/UL (ref 1.6–5.5)
NEUTROPHILS NFR BLD AUTO: 61.5 %
NRBC BLD-RTO: 0 /100 WBCS (ref 0–0)
PLATELET # BLD AUTO: 403 X10*3/UL (ref 150–450)
POTASSIUM SERPL-SCNC: 4.8 MMOL/L (ref 3.5–5.3)
PROT SERPL-MCNC: 6.7 G/DL (ref 6.4–8.2)
RBC # BLD AUTO: 3.89 X10*6/UL (ref 4–5.2)
SODIUM SERPL-SCNC: 139 MMOL/L (ref 136–145)
TSH SERPL-ACNC: 3.26 MIU/L (ref 0.44–3.98)
WBC # BLD AUTO: 6.8 X10*3/UL (ref 4.4–11.3)

## 2024-06-04 PROCEDURE — G0439 PPPS, SUBSEQ VISIT: HCPCS | Performed by: INTERNAL MEDICINE

## 2024-06-04 PROCEDURE — 82378 CARCINOEMBRYONIC ANTIGEN: CPT

## 2024-06-04 PROCEDURE — 99214 OFFICE O/P EST MOD 30 MIN: CPT | Performed by: INTERNAL MEDICINE

## 2024-06-04 PROCEDURE — 1159F MED LIST DOCD IN RCRD: CPT | Performed by: INTERNAL MEDICINE

## 2024-06-04 PROCEDURE — 84443 ASSAY THYROID STIM HORMONE: CPT

## 2024-06-04 PROCEDURE — 85025 COMPLETE CBC W/AUTO DIFF WBC: CPT

## 2024-06-04 PROCEDURE — 83036 HEMOGLOBIN GLYCOSYLATED A1C: CPT

## 2024-06-04 PROCEDURE — G0009 ADMIN PNEUMOCOCCAL VACCINE: HCPCS | Performed by: INTERNAL MEDICINE

## 2024-06-04 PROCEDURE — 90677 PCV20 VACCINE IM: CPT | Performed by: INTERNAL MEDICINE

## 2024-06-04 PROCEDURE — 1157F ADVNC CARE PLAN IN RCRD: CPT | Performed by: INTERNAL MEDICINE

## 2024-06-04 PROCEDURE — 1036F TOBACCO NON-USER: CPT | Performed by: INTERNAL MEDICINE

## 2024-06-04 PROCEDURE — 1170F FXNL STATUS ASSESSED: CPT | Performed by: INTERNAL MEDICINE

## 2024-06-04 PROCEDURE — 82306 VITAMIN D 25 HYDROXY: CPT

## 2024-06-04 PROCEDURE — 1160F RVW MEDS BY RX/DR IN RCRD: CPT | Performed by: INTERNAL MEDICINE

## 2024-06-04 PROCEDURE — 80053 COMPREHEN METABOLIC PANEL: CPT

## 2024-06-04 PROCEDURE — 36415 COLL VENOUS BLD VENIPUNCTURE: CPT

## 2024-06-04 RX ORDER — CHOLECALCIFEROL (VITAMIN D3) 125 MCG
1 CAPSULE ORAL DAILY
Qty: 30 CAPSULE | Refills: 1 | Status: SHIPPED | OUTPATIENT
Start: 2024-06-04

## 2024-06-04 RX ORDER — VIT A/VIT C/VIT E/ZINC/COPPER 2148-113
1 TABLET ORAL DAILY
COMMUNITY

## 2024-06-04 RX ORDER — BLOOD SUGAR DIAGNOSTIC
1 STRIP MISCELLANEOUS EVERY 12 HOURS
COMMUNITY
Start: 2020-03-20

## 2024-06-04 RX ORDER — NITROGLYCERIN 0.4 MG/1
0.4 TABLET SUBLINGUAL EVERY 5 MIN PRN
COMMUNITY
Start: 2018-09-14

## 2024-06-04 ASSESSMENT — ACTIVITIES OF DAILY LIVING (ADL)
DOING_HOUSEWORK: INDEPENDENT
MANAGING_FINANCES: INDEPENDENT
GROCERY_SHOPPING: INDEPENDENT
BATHING: INDEPENDENT
DRESSING: INDEPENDENT
TAKING_MEDICATION: INDEPENDENT

## 2024-06-04 ASSESSMENT — LIFESTYLE VARIABLES
HOW MANY STANDARD DRINKS CONTAINING ALCOHOL DO YOU HAVE ON A TYPICAL DAY: PATIENT DOES NOT DRINK
SKIP TO QUESTIONS 9-10: 1
HOW OFTEN DO YOU HAVE A DRINK CONTAINING ALCOHOL: NEVER
HAVE YOU OR SOMEONE ELSE BEEN INJURED AS A RESULT OF YOUR DRINKING: NO
HOW OFTEN DO YOU HAVE SIX OR MORE DRINKS ON ONE OCCASION: NEVER
AUDIT TOTAL SCORE: 0
AUDIT-C TOTAL SCORE: 0
HAS A RELATIVE, FRIEND, DOCTOR, OR ANOTHER HEALTH PROFESSIONAL EXPRESSED CONCERN ABOUT YOUR DRINKING OR SUGGESTED YOU CUT DOWN: NO

## 2024-06-04 NOTE — PROGRESS NOTES
Assessment/Plan   This is an elderly female who is 89 years old lives independently has had recent colon cancer with resection came for a annual wellness visit and review her overall care.    Patient's POA who is the niece is with the patient on this visit.  Patient has a living will and she is DNR which was discussed in detail during this visit.  POA will bring the papers to our office today to be scanned in.    Patient's immunizations reviewed and at this time patient does not need any new immunizations given her age and the condition.    We reviewed about her colon malignancy and the recommendations from the surgical team was discussed.  The importance of following up with the surgical team as recommended in 3 months was discussed and they will make an appointment.  I would also at this time we will get an opinion from the oncology department.  Will get CEA levels and if it is normal I would hold off doing the CT scan except it is recommended by the consults.    Patient's other chronic problems including arthritis were reviewed and discussed.  Patient is clinically stable and her left lower quadrant pain has improved.    Patient's chronic loose stools or diarrhea was discussed and I explained to the patient it seems to be it is the mucus from the colonic secretions.  Antibiotics are not going to help and the detrimental effect of the recurrent and prolonged use of antibiotics was discussed.  Will try probiotic to see whether that will help the patient's loose stools for by reestablishing the laron.    Her other chronic problems including hyperlipidemia and renal impairment was reviewed and discussed.  Patient will have a blood test as ordered today.    Her diabetic care was reviewed and discussed and her sugars are well-controlled and hemoglobin A1c will be followed.    Patient will be followed up in 2 months for reevaluation and discussion of her chronic problems.          Problem List Items Addressed This Visit        Malignant neoplasm of ascending colon (Multi)    Relevant Orders    CEA    Referral to Hematology and Oncology    Other specified diabetes mellitus with other specified complication, with long-term current use of insulin (Multi)    Relevant Orders    Hemoglobin A1C    Hyperlipidemia    Relevant Orders    CBC and Auto Differential    Comprehensive Metabolic Panel    Scoliosis of thoracolumbar spine    Vitamin D deficiency    Relevant Orders    Vitamin D 25-Hydroxy,Total (for eval of Vitamin D levels)    Stage 3a chronic kidney disease (Multi)    Diarrhea    Relevant Medications    Lactobacillus acidophilus (Probiotic Acidophilus) 250 million cell capsule     Other Visit Diagnoses       Routine general medical examination at health care facility    -  Primary    Screening for thyroid disorder        Relevant Orders    TSH with reflex to Free T4 if abnormal            Subjective     Patient ID: Lupis Jackson is a 89 y.o. female who presents for Medicare Annual Wellness Visit Subsequent.    History of present illness  89 years old female who lives independently has had colon cancer and had surgery about a year ago came for a follow-up visit and to have annual wellness visit.    She has a caregiver and the power of  who is her niece was with the during this visit in the room.    Patient chronic pain in her left lower quadrant which was going on before and after the colon surgery has improved or almost completely resolved at this time.    Patient has had no history of falls.    Patient has some loose stools for which she was seen by my associate on February 28, 2024 and at that time patient was empirically treated with Cipro and Flagyl and she thought the symptom improved but after she stopped the antibiotics her loose stools which are mostly mucus has come back her stool for C. difficile testing was negative.    Patient's last visit with the the surgical team was on November 2023.  There was an appointment  planned on 2024 was canceled for unknown reason for the patient.  During her last visit with me in 2024 it was decided to wait till the visit with the surgeons to decide about any further action.    Patient is eating reasonably well and also taking dietary supplement Glucerna.    She has no fever no chills.  No leg swellings no nausea vomiting.    Patient has had no recent falls.  She has good family support.    Family History   Problem Relation Name Age of Onset    No Known Problems Mother      No Known Problems Father        Social History     Socioeconomic History    Marital status:      Spouse name: Not on file    Number of children: Not on file    Years of education: Not on file    Highest education level: Not on file   Occupational History    Not on file   Tobacco Use    Smoking status: Former     Current packs/day: 0.00     Types: Cigarettes     Quit date:      Years since quittin.4     Passive exposure: Past    Smokeless tobacco: Never   Vaping Use    Vaping status: Never Used   Substance and Sexual Activity    Alcohol use: Never    Drug use: Never    Sexual activity: Defer   Other Topics Concern    Not on file   Social History Narrative    Not on file     Social Determinants of Health     Financial Resource Strain: Low Risk  (10/16/2023)    Overall Financial Resource Strain (CARDIA)     Difficulty of Paying Living Expenses: Not hard at all   Food Insecurity: Not on file   Transportation Needs: No Transportation Needs (2023)    OASIS : Transportation     Lack of Transportation (Medical): No     Lack of Transportation (Non-Medical): No     Patient Unable or Declines to Respond: No   Physical Activity: Not on file   Stress: Not on file   Social Connections: Feeling Socially Integrated (2023)    OASIS : Social Isolation     Frequency of experiencing loneliness or isolation: Rarely   Intimate Partner Violence: Not on file   Housing Stability: Low Risk   "(10/16/2023)    Housing Stability Vital Sign     Unable to Pay for Housing in the Last Year: No     Number of Places Lived in the Last Year: 1     Unstable Housing in the Last Year: No      Patient has no known allergies.   Current Outpatient Medications   Medication Sig Dispense Refill    aspirin 81 mg EC tablet Take 1 tablet (81 mg) by mouth once daily.      atorvastatin (Lipitor) 10 mg tablet Take 1 tablet (10 mg) by mouth once daily in the evening.      cholecalciferol (Vitamin D-3) 50 MCG (2000 UT) tablet Take 1 tablet (2,000 Units) by mouth once daily.      insulin glargine (Lantus U-100 Insulin) 100 unit/mL injection Inject 6 Units under the skin once daily at bedtime.      Lantus Solostar U-100 Insulin 100 unit/mL (3 mL) pen INJECT 8 UNITS SUBCUTANEOUSLY AT BEDTIME 9 mL 0    levothyroxine (Synthroid, Levoxyl) 112 mcg tablet TAKE ONE TABLET BY MOUTH DAILY 90 tablet 3    meclizine (Antivert) 12.5 mg tablet TAKE ONE TABLET BY MOUTH EVERY 12 HOURS AS NEEDED 40 tablet 0    mv-calcium-min-iron fm-FA-vitK (One-A-Day Women's Complete,vK,) 18 mg-400 mcg- 25 mcg tablet Take 1 tablet by mouth once daily.      nitroglycerin (Nitrostat) 0.4 mg SL tablet Place 1 tablet (0.4 mg) under the tongue every 5 minutes if needed for chest pain.      nut.tx.gluc intol,lf,soy-fiber (Glucerna 1.5 Maynor) 0.08-1.5 gram-kcal/mL liquid Take 237 mL by mouth 2 times daily after breakfast and lunch. 4740 mL 3    OneTouch Ultra Test strip 1 strip every 12 hours.      pantoprazole (ProtoNix) 40 mg EC tablet TAKE ONE TABLET BY MOUTH ONCE DAILY. DO NOT CRUSH, CHEW OR SPLIT 90 tablet 1    PARoxetine (Paxil) 20 mg tablet TAKE 1 & 1/2 TABLETS BY MOUTH DAILY (Patient taking differently: Take 1 tablet (20 mg) by mouth once daily in the morning.) 135 tablet 1    pen needle, diabetic (Sure Comfort Pen Needle) 32 gauge x 5/32\" needle USE ONE EVERY  each 2    vitamins A,C,E-zinc-copper (PreserVision AREDS) 2,148 mcg-113 mg-45 mg-17.4mg tablet Take " 1 tablet by mouth once daily.      Lactobacillus acidophilus (Probiotic Acidophilus) 250 million cell capsule Take 1 capsule by mouth once daily. 30 capsule 1     No current facility-administered medications for this visit.        Objective     Vitals:    06/04/24 1237   BP: 128/66   Pulse: 80        Physical Exam    Thin built pleasant elderly female,  with no apparent distress. Alert oriented  Mood is appropriate, memory is good and the intact  Has difficulty in hearing but able to communicate.  Skin:  Normal turgor.  No rash.  Head:  Normocephalic, atraumatic.  Eyes:  Pupils are equal, round,.  No pallor of conjunctivae.  Mouth has moist oral mucosa.   Neck:  Supple.  No JVD.  No carotid bruit.  No thyromegaly. No cervical lymphadenopathy.  No clubbing, significant peripheral osteoarthritis noted  Has kyphoscoliosis of the thoracic spine noted  Chest:  Vesicular breathing Bilaterally moderate air entry and bilaterally clear to auscultation.  No wheezing.  No crackles.  Heart:  Regular rate with intermittent ectopics and rhythm.  S1, S2 positive.  No murmur.  Abdomen:  Soft, slight discomfort in the left lower quadrant but no redness.  Scar tissue and underlying skin has well-healed.  Bowel sounds are positive.  No organomegaly.  Extremities:  Bilaterally no pedal pitting edema.  Bilaterally 2+ dorsalis pedis pulses.  No calf tenderness. Homans sign is negative.  Neuro Exam: No focal signs.  Able to walk independently.    Problem List Items Addressed This Visit       Malignant neoplasm of ascending colon (Multi)    Relevant Orders    CEA    Referral to Hematology and Oncology    Other specified diabetes mellitus with other specified complication, with long-term current use of insulin (Multi)    Relevant Orders    Hemoglobin A1C    Hyperlipidemia    Relevant Orders    CBC and Auto Differential    Comprehensive Metabolic Panel    Scoliosis of thoracolumbar spine    Vitamin D deficiency    Relevant Orders    Vitamin  D 25-Hydroxy,Total (for eval of Vitamin D levels)    Stage 3a chronic kidney disease (Multi)    Diarrhea    Relevant Medications    Lactobacillus acidophilus (Probiotic Acidophilus) 250 million cell capsule     Other Visit Diagnoses       Routine general medical examination at health care facility    -  Primary    Screening for thyroid disorder        Relevant Orders    TSH with reflex to Free T4 if abnormal             Orders Placed This Encounter   Procedures    Pneumococcal conjugate vaccine, 20-valent (PREVNAR 20)    CBC and Auto Differential     Standing Status:   Future     Standing Expiration Date:   12/4/2024     Order Specific Question:   Release result to [facility]     Answer:   Immediate    Comprehensive Metabolic Panel     Standing Status:   Future     Standing Expiration Date:   12/4/2024     Order Specific Question:   Release result to [facility]     Answer:   Immediate    TSH with reflex to Free T4 if abnormal     Standing Status:   Future     Standing Expiration Date:   12/4/2024     Order Specific Question:   Release result to [facility]     Answer:   Immediate    Vitamin D 25-Hydroxy,Total (for eval of Vitamin D levels)     Standing Status:   Future     Standing Expiration Date:   12/4/2024     Order Specific Question:   Release result to [facility]     Answer:   Immediate    CEA     Standing Status:   Future     Standing Expiration Date:   6/4/2025     Order Specific Question:   Release result to [facility]     Answer:   Immediate [1]    Hemoglobin A1C     Standing Status:   Future     Standing Expiration Date:   12/4/2024     Order Specific Question:   Release result to [facility]     Answer:   Immediate    Referral to Hematology and Oncology     Standing Status:   Future     Standing Expiration Date:   6/4/2025     Referral Priority:   Routine     Referral Type:   [facility] Consult     Referral Reason:   Specialty Services Required     Requested Specialty:   Hematology and Oncology     Number of Visits Requested:   1         Lab Results   Component Value Date    WBC 7.2 02/28/2024    HGB 12.1 02/28/2024    HCT 39.2 02/28/2024     02/28/2024    CHOL 136 02/02/2023    TRIG 163 (H) 04/07/2021    HDL 47.8 02/02/2023    ALT 16 01/09/2024    AST 22 01/09/2024     02/28/2024    K 4.3 02/28/2024     02/28/2024    CREATININE 0.75 02/28/2024    BUN 21 02/28/2024    CO2 29 02/28/2024    TSH 2.11 02/28/2024    INR 0.9 10/16/2023    HGBA1C 6.9 04/07/2021     Lab Results   Component Value Date    CHOL 136 02/02/2023    CHHDL 2.8 02/02/2023       Assessment:  Ascending colon cancer  S/p right colectomy T2N0 +LVI  Doing well post op     Plan:  -Diet: as tolerated  -Activity: Normal activities can be resumed. No lifting greater than 10 lbs for a full 6 weeks following surgery   -Pathology was reviewed with the patient. The follow up protocol for colon cancer was outlined for the patient. These recommendations are based on the [facility] Practice Parameters.   -Office visit and CEA every 3-6 months for 2 years and then every 6 months until 5 years  -Consider CT C/A/P every year for 5 years  -Consider colonoscopy 1 year after diagnosis and subsequent ones determined upon findings.  -Follow up with me in 3 months or sooner if any problems  All questions and concerns were addressed with patient.        MITZY Liao         Electronically signed by MITZY Liao at 11/17/2023 11:30 AM

## 2024-06-05 ENCOUNTER — TELEPHONE (OUTPATIENT)
Dept: PRIMARY CARE | Facility: CLINIC | Age: 89
End: 2024-06-05
Payer: MEDICARE

## 2024-06-05 NOTE — RESULT ENCOUNTER NOTE
Patient's labs acceptable including her hemoglobin A1c which is the 3-month average blood sugar and CEA level which is the tumor marker done as suggested by the surgeons who did the surgery.  Patient should follow-up with the surgical team and also oncologist as we discussed regarding her colon cancer.  Please have Rena help the patient if is needed to make the appointments.  I will review the results in details during the follow-up visit on 7/1/2024.

## 2024-06-05 NOTE — TELEPHONE ENCOUNTER
----- Message from Colton Menjivar MD sent at 6/5/2024  9:06 AM EDT -----  Patient's labs acceptable including her hemoglobin A1c which is the 3-month average blood sugar and CEA level which is the tumor marker done as suggested by the surgeons who did the surgery.  Patient should follow-up with the surgical team and also oncologist as we discussed regarding her colon cancer.  Please have Rena help the patient if is needed to make the appointments.  I will review the results in details during the follow-up visit on 7/1/2024.

## 2024-06-14 ENCOUNTER — OFFICE VISIT (OUTPATIENT)
Dept: SURGERY | Facility: CLINIC | Age: 89
End: 2024-06-14
Payer: MEDICARE

## 2024-06-14 VITALS
BODY MASS INDEX: 23.22 KG/M2 | SYSTOLIC BLOOD PRESSURE: 137 MMHG | WEIGHT: 123 LBS | HEART RATE: 47 BPM | HEIGHT: 61 IN | DIASTOLIC BLOOD PRESSURE: 65 MMHG | TEMPERATURE: 98.1 F

## 2024-06-14 DIAGNOSIS — C18.2 MALIGNANT NEOPLASM OF ASCENDING COLON (MULTI): Primary | ICD-10-CM

## 2024-06-14 PROCEDURE — 1125F AMNT PAIN NOTED PAIN PRSNT: CPT | Performed by: NURSE PRACTITIONER

## 2024-06-14 PROCEDURE — 99213 OFFICE O/P EST LOW 20 MIN: CPT | Performed by: NURSE PRACTITIONER

## 2024-06-14 PROCEDURE — 1157F ADVNC CARE PLAN IN RCRD: CPT | Performed by: NURSE PRACTITIONER

## 2024-06-14 PROCEDURE — 1159F MED LIST DOCD IN RCRD: CPT | Performed by: NURSE PRACTITIONER

## 2024-06-14 ASSESSMENT — PAIN SCALES - GENERAL: PAINLEVEL: 5

## 2024-06-14 NOTE — PROGRESS NOTES
Chief Compliant:  FUV colon cancer      History Of Present Illness  Lupis Jackson is a 89F here for colon cancer FUV.    S/p right colectomy for ascending colon cancer on 10/16/23.  Last seen November 2023    Imaging reviewed:  7/30/23 CT AP  Impression   1. Mild wall thickening of the distal descending and sigmoid colon,  concerning for mild colitis.  2. There is slight urothelial thickening/enhancement which is  nonspecific, though may be seen with urinary tract  infection/inflammation. Please correlate with urinalysis.  3. Colonic diverticulosis without evidence of diverticulitis.  4. Mild intrahepatic and extrahepatic biliary ductal dilation  unchanged from 11/02/2022, likely secondary to prior cholecystectomy.  5. 0.8 cm enhancing lymph node adjacent to the sigmoid colon not  substantially changed from 11/02/2022, uncertain clinical  significance.   8/30/23 CT chest  mpression   1. Colon carcinoma restaging.  2. No metastatic disease to the lungs.  3. Prominent right hilar lymph node.         Denies any F/C, N/V, CP/SOB, dysuria.   Appetite: good  Energy: good  Weight: stable  BM: c/o diarrhea. 1st BM has soft formed stools follow by loose stools 3-4 times daily. Has tried Imodium but it did not help.     She fell down 6/11. Short fall. Did not really hurt herself.     CEA :6/4/24 2.7, 8/23/23 2.3    Review of Systems   Constitutional:  Negative for activity change, appetite change, chills, diaphoresis, fever and unexpected weight change.   Respiratory:  Negative for apnea, cough, chest tightness, shortness of breath and wheezing.    Cardiovascular:  Negative for chest pain, palpitations and leg swelling.   Gastrointestinal:  Negative for abdominal distention, abdominal pain, anal bleeding, blood in stool, constipation, nausea, rectal pain and vomiting. + Diarrhea  Endocrine: Negative for cold intolerance, heat intolerance, polydipsia, polyphagia and polyuria.   Genitourinary:  Negative for difficulty urinating,  "dysuria, frequency and hematuria.   Musculoskeletal:  Right leg pain       Skin:  Negative for color change.   Neurological:  Negative for dizziness, tremors, seizures, syncope, weakness, light-headedness, numbness and headaches.   Hematological:  Negative for adenopathy. Does not bruise/bleed easily.   Psychiatric/Behavioral:  Negative for agitation, confusion, self-injury, sleep disturbance and suicidal ideas. The patient is not nervous/anxious.       Physical Exam   Constitutional: Well developed, awake/alert/oriented x3, no distress, alert and cooperative   Eyes: Sclera anicteric, no conjunctival inflammation, conjugate gaze   ENMT: mucous membranes moist, no apparent injury,   Head/Neck: Neck supple, no apparent injury, trachea midline, no bruits   Respiratory/Thorax: Patent airways, CTAB, normal breath sounds with good chest expansion  Cardiovascular: Regular, rate and rhythm, no murmurs, normal S1 and S2   Gastrointestinal: Nondistended, soft, non-tender, no rebound tenderness or guarding, no masses palpable, no organomegaly, +BS, no bruits   Extremities: normal extremities, no edema  Neurological: alert and oriented x3, normal strength, Normal gait   Psychological: Appropriate mood and behavior   Skin: Warm and dry, no lesions, no rashes     /65   Pulse (!) 47   Temp 36.7 °C (98.1 °F)   Ht 1.549 m (5' 1\")   Wt 55.8 kg (123 lb)   BMI 23.24 kg/m²       Assessment:  Ascending colon cancer  S/p right colectomy T2N0 +LVI  Doing well    Plan:  -Discussed fu CT scan and Colonoscopy. At this time she is not interested  -She will FU with me in 6 months or sooner if any problems  -Suggested taking Metamucil or Citrucel daily   -May take Imodium for loose stools     Arely Maria, APRN-CNP  "

## 2024-06-21 ENCOUNTER — APPOINTMENT (OUTPATIENT)
Dept: CARDIOLOGY | Facility: CLINIC | Age: 89
End: 2024-06-21
Payer: MEDICARE

## 2024-07-01 ENCOUNTER — APPOINTMENT (OUTPATIENT)
Dept: PRIMARY CARE | Facility: CLINIC | Age: 89
End: 2024-07-01
Payer: MEDICARE

## 2024-07-01 VITALS
HEART RATE: 78 BPM | WEIGHT: 123 LBS | BODY MASS INDEX: 23.22 KG/M2 | HEIGHT: 61 IN | SYSTOLIC BLOOD PRESSURE: 115 MMHG | DIASTOLIC BLOOD PRESSURE: 70 MMHG

## 2024-07-01 DIAGNOSIS — I25.10 CORONARY ARTERY DISEASE INVOLVING NATIVE CORONARY ARTERY OF NATIVE HEART WITHOUT ANGINA PECTORIS: ICD-10-CM

## 2024-07-01 DIAGNOSIS — H91.90 HEARING LOSS, UNSPECIFIED HEARING LOSS TYPE, UNSPECIFIED LATERALITY: ICD-10-CM

## 2024-07-01 DIAGNOSIS — M12.9 ARTHROPATHY: ICD-10-CM

## 2024-07-01 DIAGNOSIS — C18.2 MALIGNANT NEOPLASM OF ASCENDING COLON (MULTI): Primary | ICD-10-CM

## 2024-07-01 DIAGNOSIS — E55.9 VITAMIN D DEFICIENCY: ICD-10-CM

## 2024-07-01 DIAGNOSIS — E03.9 ADULT HYPOTHYROIDISM: ICD-10-CM

## 2024-07-01 DIAGNOSIS — E78.2 MIXED HYPERLIPIDEMIA: ICD-10-CM

## 2024-07-01 DIAGNOSIS — Z79.4 OTHER SPECIFIED DIABETES MELLITUS WITH OTHER SPECIFIED COMPLICATION, WITH LONG-TERM CURRENT USE OF INSULIN (MULTI): ICD-10-CM

## 2024-07-01 DIAGNOSIS — E78.1 ESSENTIAL HYPERTRIGLYCERIDEMIA: ICD-10-CM

## 2024-07-01 DIAGNOSIS — E13.69 OTHER SPECIFIED DIABETES MELLITUS WITH OTHER SPECIFIED COMPLICATION, WITH LONG-TERM CURRENT USE OF INSULIN (MULTI): ICD-10-CM

## 2024-07-01 PROCEDURE — 1036F TOBACCO NON-USER: CPT | Performed by: INTERNAL MEDICINE

## 2024-07-01 PROCEDURE — 1123F ACP DISCUSS/DSCN MKR DOCD: CPT | Performed by: INTERNAL MEDICINE

## 2024-07-01 PROCEDURE — 99214 OFFICE O/P EST MOD 30 MIN: CPT | Performed by: INTERNAL MEDICINE

## 2024-07-01 PROCEDURE — 1160F RVW MEDS BY RX/DR IN RCRD: CPT | Performed by: INTERNAL MEDICINE

## 2024-07-01 PROCEDURE — 1158F ADVNC CARE PLAN TLK DOCD: CPT | Performed by: INTERNAL MEDICINE

## 2024-07-01 PROCEDURE — 1159F MED LIST DOCD IN RCRD: CPT | Performed by: INTERNAL MEDICINE

## 2024-07-01 PROCEDURE — 1157F ADVNC CARE PLAN IN RCRD: CPT | Performed by: INTERNAL MEDICINE

## 2024-07-01 PROCEDURE — 99497 ADVNCD CARE PLAN 30 MIN: CPT | Performed by: INTERNAL MEDICINE

## 2024-07-01 ASSESSMENT — LIFESTYLE VARIABLES
AUDIT-C TOTAL SCORE: 0
AUDIT TOTAL SCORE: 0
SKIP TO QUESTIONS 9-10: 1
HOW MANY STANDARD DRINKS CONTAINING ALCOHOL DO YOU HAVE ON A TYPICAL DAY: PATIENT DOES NOT DRINK
HOW OFTEN DO YOU HAVE SIX OR MORE DRINKS ON ONE OCCASION: NEVER
HOW OFTEN DO YOU HAVE A DRINK CONTAINING ALCOHOL: NEVER
HAS A RELATIVE, FRIEND, DOCTOR, OR ANOTHER HEALTH PROFESSIONAL EXPRESSED CONCERN ABOUT YOUR DRINKING OR SUGGESTED YOU CUT DOWN: NO
HAVE YOU OR SOMEONE ELSE BEEN INJURED AS A RESULT OF YOUR DRINKING: NO

## 2024-07-01 ASSESSMENT — PATIENT HEALTH QUESTIONNAIRE - PHQ9
1. LITTLE INTEREST OR PLEASURE IN DOING THINGS: NOT AT ALL
SUM OF ALL RESPONSES TO PHQ9 QUESTIONS 1 AND 2: 0
2. FEELING DOWN, DEPRESSED OR HOPELESS: NOT AT ALL

## 2024-07-01 NOTE — PATIENT INSTRUCTIONS

## 2024-07-01 NOTE — PROGRESS NOTES
Assessment/Plan   89 years old female who is independent is living on her own with some help came for a follow-up visit.  Overall she is stable but she has colon cancer with recent surgery.  Her CEA level is borderline but not significantly elevated.  I had a lengthy discussion with the patient today since she did not want the repeat CAT scan which was suggested by the surgical team.  Patient says that she is going to be DNR CC arrest.  At this time we will continue the symptomatic management.    Fall prevention was discussed in detail.  Patient agrees to use the cane all the time.    She is taking the dietary supplement Ensure every day or 2 cans and she will continue the same.    Her blood sugar is well-controlled and her hemoglobin A1c is 7.1.    She is stable with the CAD without any angina.    Patient has osteoarthritis and arthropathy with hip pain.  At this time it will be managed conservatively.    She will be followed up in 3 months with a limited blood test as ordered.  We will focus only on comfort care to keep her comfortable and functional rather than trying to achieve any acute or prolonging the life as per her wish.      Problem List Items Addressed This Visit       Malignant neoplasm of ascending colon (Multi) - Primary    Adult hypothyroidism    Arthropathy    CAD (coronary artery disease)    Other specified diabetes mellitus with other specified complication, with long-term current use of insulin (Multi)    Relevant Orders    Hemoglobin A1C    Essential hypertriglyceridemia    Relevant Orders    Comprehensive Metabolic Panel    Hearing loss    Hyperlipidemia    Relevant Orders    CBC and Auto Differential    Comprehensive Metabolic Panel    Vitamin D deficiency    Relevant Orders    Vitamin D 25-Hydroxy,Total (for eval of Vitamin D levels)       Subjective     Patient ID: Lupis Jackson is a 89 y.o. female who presents for Follow-up, Knee Pain (right), Groin Pain, and Hip Pain (right).    History of  present illness  89 years old female who lives independently but has some family members to help came for a follow-up visit.    Patient has had colon cancer and surgery done.  She had seen the surgical staff about 2 weeks ago and the records reviewed.  Patient did not want to do a follow-up CT scan.    Patient has hip pain and groin pain for which she had seen the orthopedic Dr. Whitlock.  She was given injection but she says it lasted only for a week and she does not want any further intervention at this time.    She is functional at home when she uses a cane.  She had 1 accidental fall after she went out to feed the squirrels and was trying to come back into the house and tripped on the steps.  She had no injuries.  She did not hit her head.    She is tolerating all her medications but she is able thinking whether she should be taking all these medicines.  As noted in the advance care discussions patient does not want to prolong her life.    She denies any urine symptoms and her diarrhea is stable    Patient's rest of the past history family history is reviewed and updated.    Patient sleeps well and she does not want any pain medication during the night.    Family History   Problem Relation Name Age of Onset    No Known Problems Mother      No Known Problems Father        Social History     Socioeconomic History    Marital status:      Spouse name: Not on file    Number of children: Not on file    Years of education: Not on file    Highest education level: Not on file   Occupational History    Not on file   Tobacco Use    Smoking status: Former     Current packs/day: 0.00     Types: Cigarettes     Quit date:      Years since quittin.5     Passive exposure: Past    Smokeless tobacco: Never   Vaping Use    Vaping status: Never Used   Substance and Sexual Activity    Alcohol use: Never    Drug use: Never    Sexual activity: Defer   Other Topics Concern    Not on file   Social History Narrative     Not on file     Social Determinants of Health     Financial Resource Strain: Low Risk  (10/16/2023)    Overall Financial Resource Strain (CARDIA)     Difficulty of Paying Living Expenses: Not hard at all   Food Insecurity: Not on file   Transportation Needs: No Transportation Needs (11/13/2023)    OASIS : Transportation     Lack of Transportation (Medical): No     Lack of Transportation (Non-Medical): No     Patient Unable or Declines to Respond: No   Physical Activity: Not on file   Stress: Not on file   Social Connections: Feeling Socially Integrated (11/13/2023)    OASIS : Social Isolation     Frequency of experiencing loneliness or isolation: Rarely   Intimate Partner Violence: Not on file   Housing Stability: Low Risk  (10/16/2023)    Housing Stability Vital Sign     Unable to Pay for Housing in the Last Year: No     Number of Places Lived in the Last Year: 1     Unstable Housing in the Last Year: No      Patient has no known allergies.   Current Outpatient Medications   Medication Sig Dispense Refill    aspirin 81 mg EC tablet Take 1 tablet (81 mg) by mouth once daily.      atorvastatin (Lipitor) 10 mg tablet Take 1 tablet (10 mg) by mouth once daily in the evening.      cholecalciferol (Vitamin D-3) 50 MCG (2000 UT) tablet Take 1 tablet (2,000 Units) by mouth once daily.      insulin glargine (Lantus U-100 Insulin) 100 unit/mL injection Inject 6 Units under the skin once daily at bedtime.      Lactobacillus acidophilus (Probiotic Acidophilus) 250 million cell capsule Take 1 capsule by mouth once daily. 30 capsule 1    Lantus Solostar U-100 Insulin 100 unit/mL (3 mL) pen INJECT 8 UNITS SUBCUTANEOUSLY AT BEDTIME 9 mL 0    levothyroxine (Synthroid, Levoxyl) 112 mcg tablet TAKE ONE TABLET BY MOUTH DAILY 90 tablet 3    mv-calcium-min-iron fm-FA-vitK (One-A-Day Women's Complete,vK,) 18 mg-400 mcg- 25 mcg tablet Take 1 tablet by mouth once daily.      nitroglycerin (Nitrostat) 0.4 mg SL tablet Place 1  "tablet (0.4 mg) under the tongue every 5 minutes if needed for chest pain.      nut.tx.gluc intol,lf,soy-fiber (Glucerna 1.5 Maynor) 0.08-1.5 gram-kcal/mL liquid Take 237 mL by mouth 2 times daily after breakfast and lunch. 4740 mL 3    OneTouch Ultra Test strip 1 strip every 12 hours.      pantoprazole (ProtoNix) 40 mg EC tablet TAKE ONE TABLET BY MOUTH ONCE DAILY. DO NOT CRUSH, CHEW OR SPLIT 90 tablet 1    PARoxetine (Paxil) 20 mg tablet TAKE 1 & 1/2 TABLETS BY MOUTH DAILY (Patient taking differently: Take 1 tablet (20 mg) by mouth once daily in the morning.) 135 tablet 1    pen needle, diabetic (Sure Comfort Pen Needle) 32 gauge x 5/32\" needle USE ONE EVERY  each 2    vitamins A,C,E-zinc-copper (PreserVision AREDS) 2,148 mcg-113 mg-45 mg-17.4mg tablet Take 1 tablet by mouth once daily.       No current facility-administered medications for this visit.          Objective     Vitals:    07/01/24 1306   BP: 115/70   Pulse: 78        Physical Exam  Thin built pleasant elderly female,  with no apparent distress. Alert oriented  Has difficulty in hearing but able to communicate.  Skin:  Normal turgor.  No rash.  Head:  Normocephalic, atraumatic.  Eyes:  Pupils are equal, round,.  No pallor of conjunctivae.  Mouth has moist oral mucosa.   Neck:  Supple.  No JVD.  No carotid bruit.  No thyromegaly. No cervical lymphadenopathy.  No clubbing, significant peripheral osteoarthritis noted  Has kyphoscoliosis of the thoracic spine noted  Chest:  Vesicular breathing Bilaterally moderate air entry and bilaterally clear to auscultation.  No wheezing.  No crackles.  Heart:  Regular rate with intermittent ectopics and rhythm.  S1, S2 positive.  No murmur.  Abdomen:  Soft, no discomfort in the left lower quadrant.  Scar tissue and underlying skin has well-healed.  Bowel sounds are positive.  No organomegaly.  Extremities:  Bilaterally no pedal pitting edema.  Bilaterally 2+ dorsalis pedis pulses.  No calf tenderness. Homans sign " is negative.  Neuro Exam: No focal signs.  Able to walk independently.      Problem List Items Addressed This Visit       Malignant neoplasm of ascending colon (Multi) - Primary    Adult hypothyroidism    Arthropathy    CAD (coronary artery disease)    Other specified diabetes mellitus with other specified complication, with long-term current use of insulin (Multi)    Relevant Orders    Hemoglobin A1C    Essential hypertriglyceridemia    Relevant Orders    Comprehensive Metabolic Panel    Hearing loss    Hyperlipidemia    Relevant Orders    CBC and Auto Differential    Comprehensive Metabolic Panel    Vitamin D deficiency    Relevant Orders    Vitamin D 25-Hydroxy,Total (for eval of Vitamin D levels)        Orders Placed This Encounter   Procedures    CBC and Auto Differential     Standing Status:   Future     Standing Expiration Date:   7/1/2025     Order Specific Question:   Release result to [facility]     Answer:   Immediate    Comprehensive Metabolic Panel     Standing Status:   Future     Standing Expiration Date:   7/1/2025     Order Specific Question:   Release result to [facility]     Answer:   Immediate    Hemoglobin A1C     Standing Status:   Future     Standing Expiration Date:   7/1/2025     Order Specific Question:   Release result to [facility]     Answer:   Immediate    Vitamin D 25-Hydroxy,Total (for eval of Vitamin D levels)     Standing Status:   Future     Standing Expiration Date:   7/1/2025     Order Specific Question:   Release result to [facility]     Answer:   Immediate [1]    DNR and No Intubation and No ICU     Order Specific Question:   Plan of Care     Answer:   Code Status Discussion Completed     Order Specific Question:   Decision Maker     Answer:   Patient        Lab Results   Component Value Date    WBC 6.8 06/04/2024    HGB 12.1 06/04/2024    HCT 39.0 06/04/2024     06/04/2024    CHOL 136 02/02/2023    TRIG 163 (H) 04/07/2021    HDL 47.8 02/02/2023    ALT 11 06/04/2024    AST 14  06/04/2024     06/04/2024    K 4.8 06/04/2024     06/04/2024    CREATININE 0.81 06/04/2024    BUN 22 06/04/2024    CO2 29 06/04/2024    TSH 3.26 06/04/2024    INR 0.9 10/16/2023    HGBA1C 7.1 (H) 06/04/2024     Lab Results   Component Value Date    CHOL 136 02/02/2023    CHHDL 2.8 02/02/2023       Advance Care Planning Note     Discussion Date: 07/01/24   Discussion Participants: patient    The patient wishes to discuss Advance Care Planning today and the following is a brief summary of our discussion.     Patient has capacity to make their own medical decisions: Yes  Health Care Agent/Surrogate Decision Maker documented in chart: Yes    Documents on file and valid:  Advance Directive/Living Will: Yes   Health Care Power of : Yes  Other:     Communication of Medical Status/Prognosis:   Guarded    Communication of Treatment Goals/Options:   Discussed in detail.  Patient wants to be comfortable and functional.    Treatment Decisions  Goals of Care: quality of life is prioritized; willing to accept low-burden treatments to achieve meaningful goals     Follow Up Plan  office  Team Members  nice from her mother side, nephew from her  side.  Time Statement: Total face to face time spent on advance care planning was 18 minutes with 8 minutes spent in counseling, including the explanation.    Colton Menjivar MD  7/1/2024 1:47 PM             Patient was identified as a fall risk. Risk prevention instructions provided.

## 2024-07-23 ENCOUNTER — APPOINTMENT (OUTPATIENT)
Dept: PODIATRY | Facility: CLINIC | Age: 89
End: 2024-07-23
Payer: MEDICARE

## 2024-07-23 DIAGNOSIS — M79.675 PAIN IN TOES OF BOTH FEET: Primary | ICD-10-CM

## 2024-07-23 DIAGNOSIS — B35.1 FUNGAL NAIL INFECTION: ICD-10-CM

## 2024-07-23 DIAGNOSIS — M79.674 PAIN IN TOES OF BOTH FEET: Primary | ICD-10-CM

## 2024-07-23 PROCEDURE — 11721 DEBRIDE NAIL 6 OR MORE: CPT | Performed by: PODIATRIST

## 2024-07-23 NOTE — PROGRESS NOTES
History Of Present Illness  Lupis Jackson is a 89 y.o. female presenting for diabetic nail care. Patient complains with painful elongated nails.  PCP Dr Menjivar,   last visit 7/1/24     Past Medical History  She has a past medical history of Abnormal mammogram (10/09/2023), Breast cancer (Multi), Cataract, Colorectal cancer (Multi), Coronary artery disease, Dizziness, Essential (primary) hypertension (04/07/2022), HL (hearing loss), Hyperlipidemia, unspecified (07/26/2021), Hypothyroidism, Myocardial infarction (Multi), Presence of coronary angioplasty implant and graft (07/27/2020), Type 2 diabetes mellitus without complications (Multi) (12/15/2022), and Vitamin D deficiency, unspecified (04/22/2019).    Surgical History  She has a past surgical history that includes Other surgical history (12/10/2018); Colonoscopy; Cataract extraction; and Colon surgery.     Social History  She reports that she quit smoking about 48 years ago. Her smoking use included cigarettes. She has been exposed to tobacco smoke. She has never used smokeless tobacco. She reports that she does not drink alcohol and does not use drugs.    Family History  Family History   Problem Relation Name Age of Onset    No Known Problems Mother      No Known Problems Father          Allergies  Patient has no known allergies.    Medications  Current Outpatient Medications   Medication Sig Dispense Refill    aspirin 81 mg EC tablet Take 1 tablet (81 mg) by mouth once daily.      atorvastatin (Lipitor) 10 mg tablet Take 1 tablet (10 mg) by mouth once daily in the evening.      cholecalciferol (Vitamin D-3) 50 MCG (2000 UT) tablet Take 1 tablet (2,000 Units) by mouth once daily.      insulin glargine (Lantus U-100 Insulin) 100 unit/mL injection Inject 6 Units under the skin once daily at bedtime.      Lactobacillus acidophilus (Probiotic Acidophilus) 250 million cell capsule Take 1 capsule by mouth once daily. 30 capsule 1    Lantus Solostar U-100 Insulin 100 unit/mL  "(3 mL) pen INJECT 8 UNITS SUBCUTANEOUSLY AT BEDTIME 9 mL 0    levothyroxine (Synthroid, Levoxyl) 112 mcg tablet TAKE ONE TABLET BY MOUTH DAILY 90 tablet 3    mv-calcium-min-iron fm-FA-vitK (One-A-Day Women's Complete,vK,) 18 mg-400 mcg- 25 mcg tablet Take 1 tablet by mouth once daily.      nitroglycerin (Nitrostat) 0.4 mg SL tablet Place 1 tablet (0.4 mg) under the tongue every 5 minutes if needed for chest pain.      nut.tx.gluc intol,lf,soy-fiber (Glucerna 1.5 Maynor) 0.08-1.5 gram-kcal/mL liquid Take 237 mL by mouth 2 times daily after breakfast and lunch. 4740 mL 3    OneTouch Ultra Test strip 1 strip every 12 hours.      pantoprazole (ProtoNix) 40 mg EC tablet TAKE ONE TABLET BY MOUTH ONCE DAILY. DO NOT CRUSH, CHEW OR SPLIT 90 tablet 1    PARoxetine (Paxil) 20 mg tablet TAKE 1 & 1/2 TABLETS BY MOUTH DAILY (Patient taking differently: Take 1 tablet (20 mg) by mouth once daily in the morning.) 135 tablet 1    pen needle, diabetic (Sure Comfort Pen Needle) 32 gauge x 5/32\" needle USE ONE EVERY  each 2    vitamins A,C,E-zinc-copper (PreserVision AREDS) 2,148 mcg-113 mg-45 mg-17.4mg tablet Take 1 tablet by mouth once daily.       No current facility-administered medications for this visit.       Review of Systems    REVIEW OF SYSTEMS  GENERAL:  Negative for malaise, significant weight loss, fever  CARDIOVASCULAR: leg swelling   MUSCULOSKELETAL:  Negative for joint pain or swelling, back pain, and muscle pain.  SKIN:  Negative for lesions, rash, and itching  PSYCH:  Negative for sleep disturbance, mood disorder and recent psychosocial stressors  NEURO: Negative, denies any burning, tingling or numbness     Objective:  frail   Vasc: DP and PT pulses are palpable bilateral.  CFT is less than 3 seconds bilateral.  Skin temperature is warm to cool proximal to distal bilateral.  No edema     Neuro:  Light touch is intact to the foot bilateral.      Derm: Nails 1-5 bilateral are thickened, elongated and crumbly with " subungual debris. Skin is supple with normal texture and turgor noted.  Webspaces are clean, dry and intact bilateral.  There are no hyperkeratoses, ulcerations, verruca or other lesions noted.      Ortho: Muscle strength is 5/5 for all pedal groups tested.  Ankle joint, subtalar joint, 1st MPJ and lesser MPJ ROM is full and without pain or crepitus.  The foot type is rectus bilateral off weight bearing.  There are no structural deformities noted.  Aching knee and hip pain   Assessment/Plan     Painful nail mycosis  DM    Toenails are debrided in length and thickness to avoid infection and for pain relief  Nellie Gutierrez-Valorie, DPM  [address] [place] [address]

## 2024-07-25 ENCOUNTER — APPOINTMENT (OUTPATIENT)
Dept: CARDIOLOGY | Facility: CLINIC | Age: 89
End: 2024-07-25
Payer: MEDICARE

## 2024-07-25 VITALS
BODY MASS INDEX: 23.79 KG/M2 | HEART RATE: 80 BPM | SYSTOLIC BLOOD PRESSURE: 130 MMHG | HEIGHT: 61 IN | WEIGHT: 126 LBS | DIASTOLIC BLOOD PRESSURE: 60 MMHG

## 2024-07-25 DIAGNOSIS — E78.49 OTHER HYPERLIPIDEMIA: ICD-10-CM

## 2024-07-25 DIAGNOSIS — I25.10 CORONARY ARTERY DISEASE INVOLVING NATIVE CORONARY ARTERY OF NATIVE HEART WITHOUT ANGINA PECTORIS: Primary | ICD-10-CM

## 2024-07-25 DIAGNOSIS — R26.81 UNSTEADY GAIT: ICD-10-CM

## 2024-07-25 DIAGNOSIS — Z95.5 HISTORY OF CORONARY ARTERY STENT PLACEMENT: ICD-10-CM

## 2024-07-25 PROCEDURE — 1123F ACP DISCUSS/DSCN MKR DOCD: CPT | Performed by: INTERNAL MEDICINE

## 2024-07-25 PROCEDURE — 99214 OFFICE O/P EST MOD 30 MIN: CPT | Performed by: INTERNAL MEDICINE

## 2024-07-25 PROCEDURE — 1157F ADVNC CARE PLAN IN RCRD: CPT | Performed by: INTERNAL MEDICINE

## 2024-07-25 PROCEDURE — 1159F MED LIST DOCD IN RCRD: CPT | Performed by: INTERNAL MEDICINE

## 2024-07-25 PROCEDURE — 1036F TOBACCO NON-USER: CPT | Performed by: INTERNAL MEDICINE

## 2024-07-25 NOTE — PATIENT INSTRUCTIONS

## 2024-07-25 NOTE — ASSESSMENT & PLAN NOTE
CAD: The patient has stable, functional class I CAD, and is doing well.  No additional testing is necessary at present. Important aspects of lifestyle modification were discussed in detail with the patient.  Recent labs and testing have been reviewed.    Orders:    Follow Up In Cardiology; Future

## 2024-07-25 NOTE — ASSESSMENT & PLAN NOTE
Advised her that the greatest threat to her health was the risk of falling and injury.  Advised her to take extra care when standing up from a lying or sitting position.  She uses a cane.  May benefit from balance PT, but will defer to her PCP on this.

## 2024-07-25 NOTE — PROGRESS NOTES
"Chief Complaint:   Please see below.     History Of Present Illness:    Lupis Jackson is a 89 y.o. female presenting with CAD    This diabetic, hyperlipidemic woman has a history of CAD dating back to January 2015, at which time she sustained an MI and went on to have QUINTIN x 3 of the proximal and mid LAD. She had a 50% stenosis in the RCA and a 70% stenosis in the distal LAD that were not stented. Her EF at that time was 45%.     She reports she gets short of breath more frequently recently.  The patient denies chest discomfort,  palpitations, orthopnea, PND, syncope, and near syncope.  Her niece who accompanies her today reports she does simple household chores.    PMH: colon CA, S/P right hemicolectomy 10/2023; OA; DNR-CCA, unsteady gait       Last Recorded Vitals:  Vitals:    07/25/24 1100   BP: 130/60   BP Location: Left arm   Patient Position: Sitting   Pulse: 80   Weight: 57.2 kg (126 lb)   Height: 1.549 m (5' 1\")       Past Medical History:  She has a past medical history of Abnormal mammogram (10/09/2023), Breast cancer (Multi), Cataract, Colorectal cancer (Multi), Coronary artery disease, Dizziness, Essential (primary) hypertension (04/07/2022), HL (hearing loss), Hyperlipidemia, unspecified (07/26/2021), Hypothyroidism, Myocardial infarction (Multi), Presence of coronary angioplasty implant and graft (07/27/2020), Type 2 diabetes mellitus without complications (Multi) (12/15/2022), and Vitamin D deficiency, unspecified (04/22/2019).    Past Surgical History:  She has a past surgical history that includes Other surgical history (12/10/2018); Colonoscopy; Cataract extraction; and Colon surgery.      Social History:  She reports that she quit smoking about 48 years ago. Her smoking use included cigarettes. She has been exposed to tobacco smoke. She has never used smokeless tobacco. She reports that she does not currently use alcohol. She reports that she does not use drugs.    Family History:  Family History " "  Problem Relation Name Age of Onset    No Known Problems Mother      No Known Problems Father          Allergies:  Patient has no known allergies.    Outpatient Medications:  Current Outpatient Medications   Medication Instructions    aspirin 81 mg EC tablet 1 tablet, oral, Daily    atorvastatin (LIPITOR) 10 mg, oral, Every evening    cholecalciferol (Vitamin D-3) 50 MCG (2000 UT) tablet 1 tablet, oral, Daily    insulin glargine (LANTUS U-100 INSULIN) 6 Units, subcutaneous, Nightly    Lactobacillus acidophilus (Probiotic Acidophilus) 250 million cell capsule 1 capsule, oral, Daily    levothyroxine (Synthroid, Levoxyl) 112 mcg tablet TAKE ONE TABLET BY MOUTH DAILY    mv-calcium-min-iron fm-FA-vitK (One-A-Day Women's Complete,vK,) 18 mg-400 mcg- 25 mcg tablet 1 tablet, oral, Daily    nitroglycerin (NITROSTAT) 0.4 mg, sublingual, Every 5 min PRN    nut.tx.gluc intol,lf,soy-fiber (Glucerna 1.5 Maynor) 0.08-1.5 gram-kcal/mL liquid 237 mL, oral, 2 times daily (morning and midday)    OneTouch Ultra Test strip 1 strip, Every 12 hours    pantoprazole (PROTONIX) 40 mg, oral, Daily, DO NOT CRUSH CHEW OR SPLIT    PARoxetine (Paxil) 20 mg tablet TAKE 1 & 1/2 TABLETS BY MOUTH DAILY    pen needle, diabetic (Sure Comfort Pen Needle) 32 gauge x 5/32\" needle USE ONE EVERY DAY    vitamins A,C,E-zinc-copper (PreserVision AREDS) 2,148 mcg-113 mg-45 mg-17.4mg tablet 1 tablet, oral, Daily       Physical Exam:  GENERAL:  pleasant 89 year-old  HEENT: No xanthelasma  NECK: Supple, no palpable adenopathy or thyromegaly  CHEST: Clear to auscultation, respiratory effort unlabored  CARDIAC: RRR, normal S1 and S2, no audible murmur, rub, gallop, carotids are brisk, PMI is not displaced  ABD: Active bowel sounds, nontender, no organomegaly, no evidence of ascites  EXT: No clubbing, cyanosis, edema, or tenderness  NEURO: Awake, alert, appropriate, speech is fluent       Last Labs:  CBC -  Lab Results   Component Value Date    WBC 6.8 06/04/2024    " HGB 12.1 06/04/2024    HCT 39.0 06/04/2024     06/04/2024     06/04/2024       CMP -  Lab Results   Component Value Date    CALCIUM 10.5 (H) 06/04/2024    PHOS 2.3 (L) 10/19/2023    PROT 6.7 06/04/2024    ALBUMIN 4.1 06/04/2024    AST 14 06/04/2024    ALT 11 06/04/2024    ALKPHOS 98 06/04/2024    BILITOT 0.3 06/04/2024       LIPID PANEL -   Lab Results   Component Value Date    CHOL 136 02/02/2023    TRIG 163 (H) 04/07/2021    HDL 47.8 02/02/2023    CHHDL 2.8 02/02/2023    LDLF 52 04/07/2021    VLDL 33 04/07/2021    NHDL 104 06/03/2019       RENAL FUNCTION PANEL -   Lab Results   Component Value Date    GLUCOSE 182 (H) 06/04/2024     06/04/2024    K 4.8 06/04/2024     06/04/2024    CO2 29 06/04/2024    ANIONGAP 13 06/04/2024    BUN 22 06/04/2024    CREATININE 0.81 06/04/2024    CALCIUM 10.5 (H) 06/04/2024    PHOS 2.3 (L) 10/19/2023    ALBUMIN 4.1 06/04/2024        Lab Results   Component Value Date    HGBA1C 7.1 (H) 06/04/2024         Lab review: I have Chemistry CMP:   Lab Results   Component Value Date    ALBUMIN 4.1 06/04/2024    CALCIUM 10.5 (H) 06/04/2024    CO2 29 06/04/2024    CREATININE 0.81 06/04/2024    GLUCOSE 182 (H) 06/04/2024    BILITOT 0.3 06/04/2024    PROT 6.7 06/04/2024    ALT 11 06/04/2024    AST 14 06/04/2024    ALKPHOS 98 06/04/2024   , Chemistry BMP   Lab Results   Component Value Date    GLUCOSE 182 (H) 06/04/2024    CALCIUM 10.5 (H) 06/04/2024    CO2 29 06/04/2024    CREATININE 0.81 06/04/2024   , and CBC:  Lab Results   Component Value Date    WBC 6.8 06/04/2024    RBC 3.89 (L) 06/04/2024    HGB 12.1 06/04/2024    HCT 39.0 06/04/2024     06/04/2024    MCH 31.1 06/04/2024    MCHC 31.0 (L) 06/04/2024    RDW 13.2 06/04/2024    MPV 10.4 10/19/2023    NRBC 0.0 06/04/2024       Assessment/Plan   Assessment & Plan  Coronary artery disease involving native coronary artery of native heart without angina pectoris  CAD: The patient has stable, functional class I CAD,  and is doing well.  No additional testing is necessary at present. Important aspects of lifestyle modification were discussed in detail with the patient.  Recent labs and testing have been reviewed.    Orders:    Follow Up In Cardiology; Future    Other hyperlipidemia    Orders:    Follow Up In Cardiology; Future    History of coronary artery stent placement         Unsteady gait  Advised her that the greatest threat to her health was the risk of falling and injury.  Advised her to take extra care when standing up from a lying or sitting position.  She uses a cane.  May benefit from balance PT, but will defer to her PCP on this.             Andres Andrews MD

## 2024-08-10 DIAGNOSIS — Z79.4 TYPE 2 DIABETES MELLITUS WITH OTHER SPECIFIED COMPLICATION, WITH LONG-TERM CURRENT USE OF INSULIN (MULTI): ICD-10-CM

## 2024-08-10 DIAGNOSIS — E11.69 TYPE 2 DIABETES MELLITUS WITH OTHER SPECIFIED COMPLICATION, WITH LONG-TERM CURRENT USE OF INSULIN (MULTI): ICD-10-CM

## 2024-08-10 DIAGNOSIS — R42 DIZZINESS: ICD-10-CM

## 2024-08-12 RX ORDER — INSULIN GLARGINE 100 [IU]/ML
INJECTION, SOLUTION SUBCUTANEOUS
Qty: 9 ML | Refills: 0 | Status: SHIPPED | OUTPATIENT
Start: 2024-08-12

## 2024-08-12 RX ORDER — MECLIZINE HCL 12.5 MG 12.5 MG/1
12.5 TABLET ORAL EVERY 12 HOURS PRN
Qty: 40 TABLET | Refills: 0 | Status: SHIPPED | OUTPATIENT
Start: 2024-08-12

## 2024-08-20 DIAGNOSIS — E03.9 HYPOTHYROIDISM, UNSPECIFIED TYPE: ICD-10-CM

## 2024-08-20 RX ORDER — LEVOTHYROXINE SODIUM 112 UG/1
TABLET ORAL
Qty: 90 TABLET | Refills: 3 | Status: SHIPPED | OUTPATIENT
Start: 2024-08-20

## 2024-08-21 ENCOUNTER — APPOINTMENT (OUTPATIENT)
Dept: GASTROENTEROLOGY | Facility: CLINIC | Age: 89
End: 2024-08-21
Payer: MEDICARE

## 2024-08-21 VITALS
DIASTOLIC BLOOD PRESSURE: 70 MMHG | HEIGHT: 61 IN | RESPIRATION RATE: 18 BRPM | SYSTOLIC BLOOD PRESSURE: 128 MMHG | OXYGEN SATURATION: 98 % | WEIGHT: 125.8 LBS | HEART RATE: 72 BPM | BODY MASS INDEX: 23.75 KG/M2

## 2024-08-21 DIAGNOSIS — R19.7 DIARRHEA, UNSPECIFIED TYPE: ICD-10-CM

## 2024-08-21 DIAGNOSIS — L29.9 PRURITUS: Primary | ICD-10-CM

## 2024-08-21 PROCEDURE — 1160F RVW MEDS BY RX/DR IN RCRD: CPT | Performed by: STUDENT IN AN ORGANIZED HEALTH CARE EDUCATION/TRAINING PROGRAM

## 2024-08-21 PROCEDURE — 1157F ADVNC CARE PLAN IN RCRD: CPT | Performed by: STUDENT IN AN ORGANIZED HEALTH CARE EDUCATION/TRAINING PROGRAM

## 2024-08-21 PROCEDURE — 99214 OFFICE O/P EST MOD 30 MIN: CPT | Performed by: STUDENT IN AN ORGANIZED HEALTH CARE EDUCATION/TRAINING PROGRAM

## 2024-08-21 PROCEDURE — 1159F MED LIST DOCD IN RCRD: CPT | Performed by: STUDENT IN AN ORGANIZED HEALTH CARE EDUCATION/TRAINING PROGRAM

## 2024-08-21 PROCEDURE — 1123F ACP DISCUSS/DSCN MKR DOCD: CPT | Performed by: STUDENT IN AN ORGANIZED HEALTH CARE EDUCATION/TRAINING PROGRAM

## 2024-08-21 PROCEDURE — 1036F TOBACCO NON-USER: CPT | Performed by: STUDENT IN AN ORGANIZED HEALTH CARE EDUCATION/TRAINING PROGRAM

## 2024-08-21 RX ORDER — CHOLESTYRAMINE 4 G/9G
1 POWDER, FOR SUSPENSION ORAL 2 TIMES DAILY
Qty: 60 PACKET | Refills: 2 | Status: SHIPPED | OUTPATIENT
Start: 2024-08-21

## 2024-08-21 NOTE — PROGRESS NOTES
"Subjective     History of Present Illness:   Lupis Jackson is a 89 y.o. female with hx of CAD on ASA, colorectal cancer s/p right hemicolectomy in 10/2023, T2DM on insulin, HLD who presents for eval of diarrhea.     She was previously evaluated Dr. Geronimo in May 2023 for complaints of diarrhea as well. At that time colonoscopy was completed during which ascending colon mass was identified.     Patient states since her surgery in October her diarrhea worsened.  She states she has up to 7 or 8 bowel movements in the morning after eating breakfast and then she is \"free\" for the afternoon.  She has no blood in her stool.  She has no abdominal pain.  She has no weight loss.  She trialed Imodium 2-3 times a day without relief.    she has declined surveillance colonoscopies and imaging during most recent follow-up visit in July of this year.    Past Medical History   has a past medical history of Abnormal mammogram (10/09/2023), Breast cancer (Multi), Cataract, Colorectal cancer (Multi), Coronary artery disease, Dizziness, Essential (primary) hypertension (04/07/2022), HL (hearing loss), Hyperlipidemia, unspecified (07/26/2021), Hypothyroidism, Myocardial infarction (Multi), Presence of coronary angioplasty implant and graft (07/27/2020), Type 2 diabetes mellitus without complications (Multi) (12/15/2022), and Vitamin D deficiency, unspecified (04/22/2019).     Social History   reports that she quit smoking about 48 years ago. Her smoking use included cigarettes. She has been exposed to tobacco smoke. She has never used smokeless tobacco. She reports that she does not currently use alcohol. She reports that she does not use drugs.     Family History  family history includes No Known Problems in her father and mother.     Allergies  No Known Allergies    Medications  Current Outpatient Medications   Medication Instructions    aspirin 81 mg EC tablet 1 tablet, oral, Daily    atorvastatin (LIPITOR) 10 mg, oral, Every evening    " "cholecalciferol (Vitamin D-3) 50 MCG (2000 UT) tablet 1 tablet, oral, Daily    cholestyramine (Questran) 4 gram packet 4 g, oral, 2 times daily, Take other medications 1 hour before or 4-6 hours after this medication.    insulin glargine (LANTUS U-100 INSULIN) 6 Units, subcutaneous, Nightly    Lactobacillus acidophilus (Probiotic Acidophilus) 250 million cell capsule 1 capsule, oral, Daily    Lantus Solostar U-100 Insulin 100 unit/mL (3 mL) pen INJECT 8 UNITS SUBCUTANEOUSLY AT BEDTIME    levothyroxine (Synthroid, Levoxyl) 112 mcg tablet TAKE ONE TABLET BY MOUTH DAILY    meclizine (ANTIVERT) 12.5 mg, oral, Every 12 hours PRN    mv-calcium-min-iron fm-FA-vitK (One-A-Day Women's Complete,vK,) 18 mg-400 mcg- 25 mcg tablet 1 tablet, oral, Daily    nitroglycerin (NITROSTAT) 0.4 mg, sublingual, Every 5 min PRN    nut.tx.gluc intol,lf,soy-fiber (Glucerna 1.5 Maynor) 0.08-1.5 gram-kcal/mL liquid 237 mL, oral, 2 times daily (morning and midday)    OneTouch Ultra Test strip 1 strip, Every 12 hours    pantoprazole (PROTONIX) 40 mg, oral, Daily, DO NOT CRUSH CHEW OR SPLIT    PARoxetine (Paxil) 20 mg tablet TAKE 1 & 1/2 TABLETS BY MOUTH DAILY    pen needle, diabetic (Sure Comfort Pen Needle) 32 gauge x 5/32\" needle USE ONE EVERY DAY    vitamins A,C,E-zinc-copper (PreserVision AREDS) 2,148 mcg-113 mg-45 mg-17.4mg tablet 1 tablet, oral, Daily        Objective   Visit Vitals  /70 (BP Location: Left arm, Patient Position: Sitting, BP Cuff Size: Adult)   Pulse 72   Resp 18      Physical Exam  Constitutional:       General: She is not in acute distress.     Appearance: Normal appearance.   HENT:      Head: Normocephalic and atraumatic.      Mouth/Throat:      Mouth: Mucous membranes are moist.   Eyes:      Extraocular Movements: Extraocular movements intact.   Pulmonary:      Effort: Pulmonary effort is normal.      Breath sounds: No stridor. No wheezing.   Abdominal:      General: Abdomen is flat. There is no distension. "   Musculoskeletal:         General: Normal range of motion.   Skin:     General: Skin is warm and dry.   Neurological:      General: No focal deficit present.      Mental Status: She is alert.   Psychiatric:         Mood and Affect: Mood normal.         Judgment: Judgment normal.         Assessment/Plan   Lupis Jackson is a 89 y.o. female with hx of CAD on ASA, colorectal cancer s/p right hemicolectomy in 10/2023, T2DM on insulin, HLD who presents for eval of chronic diarrhea since her colon surgery in 10/2023.     Pt favors conservative measures with her age she is not interested in proceeding with colonoscopy at this time.  Discussed that we can consider a trial of cholestyramine for presumed bile acid diarrhea as initial step.  If she is not improving with this may warrant rediscussion of colonoscopy but show of left-sided colon thickening on prior imaging. Pancreatic elastase previously normal.      Problem List Items Addressed This Visit       Diarrhea     Other Visit Diagnoses       Pruritus    -  Primary    Relevant Medications    cholestyramine (Questran) 4 gram packet                   Desiree Prakash MD         My final recommendations will be communicated back to the requesting physician by way of shared Medical record or letter to requesting physician via fax.

## 2024-08-29 ENCOUNTER — APPOINTMENT (OUTPATIENT)
Dept: ENDOCRINOLOGY | Facility: CLINIC | Age: 89
End: 2024-08-29
Payer: MEDICARE

## 2024-08-29 VITALS
DIASTOLIC BLOOD PRESSURE: 68 MMHG | BODY MASS INDEX: 19.93 KG/M2 | SYSTOLIC BLOOD PRESSURE: 129 MMHG | HEIGHT: 66 IN | WEIGHT: 124 LBS

## 2024-08-29 DIAGNOSIS — E03.9 ADULT HYPOTHYROIDISM: ICD-10-CM

## 2024-08-29 DIAGNOSIS — E10.69 TYPE 1 DIABETES MELLITUS WITH OTHER SPECIFIED COMPLICATION (MULTI): Primary | ICD-10-CM

## 2024-08-29 LAB
POC FINGERSTICK BLOOD GLUCOSE: 221 MG/DL (ref 70–100)
POC HEMOGLOBIN A1C: 6.7 % (ref 4.2–6.5)

## 2024-08-29 PROCEDURE — 83036 HEMOGLOBIN GLYCOSYLATED A1C: CPT | Performed by: HOSPITALIST

## 2024-08-29 PROCEDURE — 1159F MED LIST DOCD IN RCRD: CPT | Performed by: HOSPITALIST

## 2024-08-29 PROCEDURE — 1123F ACP DISCUSS/DSCN MKR DOCD: CPT | Performed by: HOSPITALIST

## 2024-08-29 PROCEDURE — 1157F ADVNC CARE PLAN IN RCRD: CPT | Performed by: HOSPITALIST

## 2024-08-29 PROCEDURE — 99214 OFFICE O/P EST MOD 30 MIN: CPT | Performed by: HOSPITALIST

## 2024-08-29 PROCEDURE — 82962 GLUCOSE BLOOD TEST: CPT | Performed by: HOSPITALIST

## 2024-08-29 ASSESSMENT — ENCOUNTER SYMPTOMS
LIGHT-HEADEDNESS: 0
AGITATION: 0
CHEST TIGHTNESS: 0
DYSURIA: 0
DIARRHEA: 0
NAUSEA: 0
CONSTIPATION: 0
ABDOMINAL DISTENTION: 0
VOICE CHANGE: 0
HEADACHES: 0
TREMORS: 0
SORE THROAT: 0
VOMITING: 0
SLEEP DISTURBANCE: 0
ARTHRALGIAS: 1
FREQUENCY: 0
CONSTITUTIONAL NEGATIVE: 1
ABDOMINAL PAIN: 0
NERVOUS/ANXIOUS: 0
PHOTOPHOBIA: 0
SHORTNESS OF BREATH: 0
EYE ITCHING: 0
PALPITATIONS: 0
TROUBLE SWALLOWING: 0

## 2024-08-29 NOTE — PROGRESS NOTES
Subjective   Patient ID: Lupis Jackson is a 89 y.o. female who presents for Diabetes (follow-up Type 1 Diabetes. Currently testing glucose once daily./PCP: Otto /Podiatry: Matt /Actos - she mentions she had SE /Tradjenta - too expensive//glucometer downloaded checks every morning, onetouch mini meter//6/4/2024 hga1c 7.1%).  Lab Results   Component Value Date    HGBA1C 6.7 (A) 08/29/2024      HPI    Review of Systems   Constitutional: Negative.    HENT:  Negative for sore throat, trouble swallowing and voice change.    Eyes:  Negative for photophobia, itching and visual disturbance.   Respiratory:  Negative for chest tightness and shortness of breath.    Cardiovascular:  Negative for chest pain and palpitations.   Gastrointestinal:  Negative for abdominal distention, abdominal pain, constipation, diarrhea, nausea and vomiting.   Endocrine: Negative for cold intolerance, heat intolerance and polyuria.   Genitourinary:  Negative for dysuria and frequency.   Musculoskeletal:  Positive for arthralgias.   Skin:  Negative for pallor.   Allergic/Immunologic: Negative for environmental allergies.   Neurological:  Negative for tremors, light-headedness and headaches.   Psychiatric/Behavioral:  Negative for agitation and sleep disturbance. The patient is not nervous/anxious.        Objective   Physical Exam  Constitutional:       Appearance: Normal appearance.   HENT:      Head: Normocephalic.      Nose: Nose normal.      Mouth/Throat:      Mouth: Mucous membranes are moist.   Eyes:      Extraocular Movements: Extraocular movements intact.   Cardiovascular:      Rate and Rhythm: Normal rate.   Pulmonary:      Effort: Pulmonary effort is normal. No respiratory distress.   Abdominal:      General: There is no distension.   Musculoskeletal:         General: Normal range of motion.      Cervical back: Normal range of motion and neck supple.   Skin:     General: Skin is warm and dry.   Neurological:      Mental Status: She is alert  "and oriented to person, place, and time.   Psychiatric:         Mood and Affect: Mood normal.      Visit Vitals  /68   Ht 1.676 m (5' 6\")   Wt 56.2 kg (124 lb)   BMI 20.01 kg/m²   OB Status Postmenopausal   Smoking Status Former   BSA 1.62 m²        Assessment/Plan   Diagnoses and all orders for this visit:  Type 1 diabetes mellitus with other specified complication (Multi)  -     POCT glucose manually resulted  -     POCT glycosylated hemoglobin (Hb A1C) manually resulted  Adult hypothyroidism       T2 DM , controlled   DM:   Lantus 0-0-0-6     not taking any actos or tradjenta   actos - she mentions she had SE   tradjenta - too expensive      glucometer reviewed and checks every morning. excellent BG control for her age  she denies any hypoglycemia   A1c at goal       INTERVAL history :   Dx with adenocarcinoma of colon in the past  Diarrhea resolved after starting cholestyramine   c/o LLQ pain and diarrhea      PLAN:   - doing very well from DM standpoint   -Diarrhea resolved after starting cholestyramine  She mentions she is okay with the cost of insulin and did not want samples  - clinically euthyroid, continue same dose, TSH 3.26 around 2 months ago  - on statin   - BP at goal, on ACE i       OFF NOTE calcium higher range of normal- possible mild hyperparathyroidism , which is not new, oral hydration   not a surgical candidate. will continue to follow.         RTC 4m       SH - she was in [place] moved here for her daughter who passed away and her son in law is sick now   she has no GK, she was working until the age of 89        "

## 2024-09-18 DIAGNOSIS — F32.A DEPRESSION, UNSPECIFIED DEPRESSION TYPE: ICD-10-CM

## 2024-09-18 DIAGNOSIS — R10.12 LEFT UPPER QUADRANT ABDOMINAL PAIN: ICD-10-CM

## 2024-09-18 DIAGNOSIS — E78.2 MIXED HYPERLIPIDEMIA: ICD-10-CM

## 2024-09-20 RX ORDER — PAROXETINE HYDROCHLORIDE 20 MG/1
20 TABLET, FILM COATED ORAL EVERY MORNING
Qty: 90 TABLET | Refills: 3 | Status: SHIPPED | OUTPATIENT
Start: 2024-09-20

## 2024-09-20 RX ORDER — PANTOPRAZOLE SODIUM 40 MG/1
40 TABLET, DELAYED RELEASE ORAL DAILY
Qty: 90 TABLET | Refills: 1 | Status: SHIPPED | OUTPATIENT
Start: 2024-09-20

## 2024-09-20 RX ORDER — ATORVASTATIN CALCIUM 10 MG/1
10 TABLET, FILM COATED ORAL EVERY EVENING
Qty: 90 TABLET | Refills: 3 | Status: SHIPPED | OUTPATIENT
Start: 2024-09-20

## 2024-09-24 ENCOUNTER — APPOINTMENT (OUTPATIENT)
Dept: PODIATRY | Facility: CLINIC | Age: 89
End: 2024-09-24
Payer: MEDICARE

## 2024-09-24 DIAGNOSIS — Z79.4 OTHER SPECIFIED DIABETES MELLITUS WITH OTHER SPECIFIED COMPLICATION, WITH LONG-TERM CURRENT USE OF INSULIN: Primary | ICD-10-CM

## 2024-09-24 DIAGNOSIS — M79.674 PAIN IN TOES OF BOTH FEET: ICD-10-CM

## 2024-09-24 DIAGNOSIS — E13.69 OTHER SPECIFIED DIABETES MELLITUS WITH OTHER SPECIFIED COMPLICATION, WITH LONG-TERM CURRENT USE OF INSULIN: Primary | ICD-10-CM

## 2024-09-24 DIAGNOSIS — M79.675 PAIN IN TOES OF BOTH FEET: ICD-10-CM

## 2024-09-24 DIAGNOSIS — B35.1 FUNGAL NAIL INFECTION: ICD-10-CM

## 2024-09-24 PROCEDURE — 11721 DEBRIDE NAIL 6 OR MORE: CPT | Performed by: PODIATRIST

## 2024-09-24 NOTE — PROGRESS NOTES
History Of Present Illness  Lupis Jackson is a 89 y.o. female presenting for diabetic nail care. Patient complains with painful elongated nails.    PCP Colton Menjivar MD  last visit 7/1/24     Past Medical History  She has a past medical history of Abnormal mammogram (10/09/2023), Breast cancer (Multi), Cataract, Colorectal cancer (Multi), Coronary artery disease, Dizziness, Essential (primary) hypertension (04/07/2022), HL (hearing loss), Hyperlipidemia, unspecified (07/26/2021), Hypothyroidism, Myocardial infarction (Multi), Presence of coronary angioplasty implant and graft (07/27/2020), Type 2 diabetes mellitus without complications (Multi) (12/15/2022), and Vitamin D deficiency, unspecified (04/22/2019).    Surgical History  She has a past surgical history that includes Other surgical history (12/10/2018); Colonoscopy; Cataract extraction; and Colon surgery.     Social History  She reports that she quit smoking about 48 years ago. Her smoking use included cigarettes. She has been exposed to tobacco smoke. She has never used smokeless tobacco. She reports that she does not currently use alcohol. She reports that she does not use drugs.    Family History  Family History   Problem Relation Name Age of Onset    No Known Problems Mother      No Known Problems Father          Allergies  Patient has no known allergies.    Medications  Current Outpatient Medications   Medication Sig Dispense Refill    aspirin 81 mg EC tablet Take 1 tablet (81 mg) by mouth once daily.      atorvastatin (Lipitor) 10 mg tablet TAKE ONE TABLET BY MOUTH DAILY IN THE EVENING 90 tablet 3    cholecalciferol (Vitamin D-3) 50 MCG (2000 UT) tablet Take 1 tablet (2,000 Units) by mouth once daily.      cholestyramine (Questran) 4 gram packet Take 1 packet (4 g) by mouth 2 times a day. Take other medications 1 hour before or 4-6 hours after this medication. 60 packet 2    Lactobacillus acidophilus (Probiotic Acidophilus) 250 million cell capsule Take 1 capsule  "by mouth once daily. 30 capsule 1    Lantus Solostar U-100 Insulin 100 unit/mL (3 mL) pen INJECT 8 UNITS SUBCUTANEOUSLY AT BEDTIME (Patient taking differently: Inject 6 Units under the skin once daily at bedtime.) 9 mL 0    levothyroxine (Synthroid, Levoxyl) 112 mcg tablet TAKE ONE TABLET BY MOUTH DAILY 90 tablet 3    meclizine (Antivert) 12.5 mg tablet TAKE ONE TABLET BY MOUTH EVERY 12 HOURS AS NEEDED 40 tablet 0    mv-calcium-min-iron fm-FA-vitK (One-A-Day Women's Complete,vK,) 18 mg-400 mcg- 25 mcg tablet Take 1 tablet by mouth once daily.      nitroglycerin (Nitrostat) 0.4 mg SL tablet Place 1 tablet (0.4 mg) under the tongue every 5 minutes if needed for chest pain.      nut.tx.gluc intol,lf,soy-fiber (Glucerna 1.5 Maynor) 0.08-1.5 gram-kcal/mL liquid Take 237 mL by mouth 2 times daily after breakfast and lunch. 4740 mL 3    OneTouch Ultra Test strip 1 strip every 12 hours.      pantoprazole (ProtoNix) 40 mg EC tablet TAKE ONE TABLET BY MOUTH DAILY. DO NOT CRUSH, CHEW OR SPLIT 90 tablet 1    PARoxetine (Paxil) 20 mg tablet Take 1 tablet (20 mg) by mouth once daily in the morning. 90 tablet 3    pen needle, diabetic (Sure Comfort Pen Needle) 32 gauge x 5/32\" needle USE ONE EVERY  each 2    vitamins A,C,E-zinc-copper (PreserVision AREDS) 2,148 mcg-113 mg-45 mg-17.4mg tablet Take 1 tablet by mouth once daily.       No current facility-administered medications for this visit.       Review of Systems    REVIEW OF SYSTEMS  GENERAL:  Negative for malaise, significant weight loss, fever  CARDIOVASCULAR: leg swelling   MUSCULOSKELETAL:  Negative for joint pain or swelling, back pain, and muscle pain.  SKIN:  Negative for lesions, rash, and itching  PSYCH:  Negative for sleep disturbance, mood disorder and recent psychosocial stressors  NEURO: Negative, denies any burning, tingling or numbness     Objective:  frail   Vasc: DP and PT pulses are palpable bilateral.  CFT is less than 3 seconds bilateral.  Skin " temperature is warm to cool proximal to distal bilateral.  No edema     Neuro:  Light touch is intact to the foot bilateral.      Derm: Nails 1-5 bilateral are thickened, elongated and crumbly with subungual debris. Skin is supple with normal texture and turgor noted.  Webspaces are clean, dry and intact bilateral.  There are no hyperkeratoses, ulcerations, verruca or other lesions noted.      Ortho: Muscle strength is 5/5 for all pedal groups tested.  Ankle joint, subtalar joint, 1st MPJ and lesser MPJ ROM is full and without pain or crepitus.  The foot type is rectus bilateral off weight bearing.  There are no structural deformities noted.    Assessment/Plan     Painful nail mycosis  DM    Toenails are debrided in length and thickness to avoid infection and for pain relief  Nellie Gutierrez-Valorie, DPM  [id number] [place] [address]

## 2024-09-30 ENCOUNTER — LAB (OUTPATIENT)
Dept: LAB | Facility: LAB | Age: 89
End: 2024-09-30
Payer: MEDICARE

## 2024-09-30 DIAGNOSIS — E78.2 MIXED HYPERLIPIDEMIA: ICD-10-CM

## 2024-09-30 DIAGNOSIS — E55.9 VITAMIN D DEFICIENCY: ICD-10-CM

## 2024-09-30 DIAGNOSIS — E13.69 OTHER SPECIFIED DIABETES MELLITUS WITH OTHER SPECIFIED COMPLICATION, WITH LONG-TERM CURRENT USE OF INSULIN: ICD-10-CM

## 2024-09-30 DIAGNOSIS — Z79.4 OTHER SPECIFIED DIABETES MELLITUS WITH OTHER SPECIFIED COMPLICATION, WITH LONG-TERM CURRENT USE OF INSULIN: ICD-10-CM

## 2024-09-30 DIAGNOSIS — E78.1 ESSENTIAL HYPERTRIGLYCERIDEMIA: ICD-10-CM

## 2024-09-30 LAB
25(OH)D3 SERPL-MCNC: 41 NG/ML (ref 30–100)
ALBUMIN SERPL BCP-MCNC: 4.1 G/DL (ref 3.4–5)
ALP SERPL-CCNC: 95 U/L (ref 33–136)
ALT SERPL W P-5'-P-CCNC: 9 U/L (ref 7–45)
ANION GAP SERPL CALC-SCNC: 14 MMOL/L (ref 10–20)
AST SERPL W P-5'-P-CCNC: 14 U/L (ref 9–39)
BASOPHILS # BLD AUTO: 0.05 X10*3/UL (ref 0–0.1)
BASOPHILS NFR BLD AUTO: 0.9 %
BILIRUB SERPL-MCNC: 0.5 MG/DL (ref 0–1.2)
BUN SERPL-MCNC: 22 MG/DL (ref 6–23)
CALCIUM SERPL-MCNC: 9.7 MG/DL (ref 8.6–10.3)
CHLORIDE SERPL-SCNC: 104 MMOL/L (ref 98–107)
CO2 SERPL-SCNC: 26 MMOL/L (ref 21–32)
CREAT SERPL-MCNC: 0.85 MG/DL (ref 0.5–1.05)
EGFRCR SERPLBLD CKD-EPI 2021: 62 ML/MIN/1.73M*2
EOSINOPHIL # BLD AUTO: 0.15 X10*3/UL (ref 0–0.4)
EOSINOPHIL NFR BLD AUTO: 2.8 %
ERYTHROCYTE [DISTWIDTH] IN BLOOD BY AUTOMATED COUNT: 12 % (ref 11.5–14.5)
EST. AVERAGE GLUCOSE BLD GHB EST-MCNC: 163 MG/DL
GLUCOSE SERPL-MCNC: 219 MG/DL (ref 74–99)
HBA1C MFR BLD: 7.3 %
HCT VFR BLD AUTO: 40.7 % (ref 36–46)
HGB BLD-MCNC: 12.4 G/DL (ref 12–16)
IMM GRANULOCYTES # BLD AUTO: 0.02 X10*3/UL (ref 0–0.5)
IMM GRANULOCYTES NFR BLD AUTO: 0.4 % (ref 0–0.9)
LYMPHOCYTES # BLD AUTO: 1.84 X10*3/UL (ref 0.8–3)
LYMPHOCYTES NFR BLD AUTO: 33.8 %
MCH RBC QN AUTO: 30.7 PG (ref 26–34)
MCHC RBC AUTO-ENTMCNC: 30.5 G/DL (ref 32–36)
MCV RBC AUTO: 101 FL (ref 80–100)
MONOCYTES # BLD AUTO: 0.38 X10*3/UL (ref 0.05–0.8)
MONOCYTES NFR BLD AUTO: 7 %
NEUTROPHILS # BLD AUTO: 3.01 X10*3/UL (ref 1.6–5.5)
NEUTROPHILS NFR BLD AUTO: 55.1 %
NRBC BLD-RTO: 0 /100 WBCS (ref 0–0)
PLATELET # BLD AUTO: 366 X10*3/UL (ref 150–450)
POTASSIUM SERPL-SCNC: 4.5 MMOL/L (ref 3.5–5.3)
PROT SERPL-MCNC: 6.2 G/DL (ref 6.4–8.2)
RBC # BLD AUTO: 4.04 X10*6/UL (ref 4–5.2)
SODIUM SERPL-SCNC: 139 MMOL/L (ref 136–145)
WBC # BLD AUTO: 5.5 X10*3/UL (ref 4.4–11.3)

## 2024-09-30 PROCEDURE — 80053 COMPREHEN METABOLIC PANEL: CPT

## 2024-09-30 PROCEDURE — 36415 COLL VENOUS BLD VENIPUNCTURE: CPT

## 2024-09-30 PROCEDURE — 82306 VITAMIN D 25 HYDROXY: CPT

## 2024-09-30 PROCEDURE — 83036 HEMOGLOBIN GLYCOSYLATED A1C: CPT

## 2024-09-30 PROCEDURE — 85025 COMPLETE CBC W/AUTO DIFF WBC: CPT

## 2024-10-01 ENCOUNTER — TELEPHONE (OUTPATIENT)
Dept: PRIMARY CARE | Facility: CLINIC | Age: 89
End: 2024-10-01

## 2024-10-01 ENCOUNTER — APPOINTMENT (OUTPATIENT)
Dept: PRIMARY CARE | Facility: CLINIC | Age: 89
End: 2024-10-01
Payer: MEDICARE

## 2024-10-01 VITALS
DIASTOLIC BLOOD PRESSURE: 65 MMHG | HEIGHT: 66 IN | SYSTOLIC BLOOD PRESSURE: 113 MMHG | WEIGHT: 125 LBS | BODY MASS INDEX: 20.09 KG/M2 | HEART RATE: 88 BPM

## 2024-10-01 DIAGNOSIS — E78.1 ESSENTIAL HYPERTRIGLYCERIDEMIA: ICD-10-CM

## 2024-10-01 DIAGNOSIS — E78.2 MIXED HYPERLIPIDEMIA: ICD-10-CM

## 2024-10-01 DIAGNOSIS — M81.0 AGE-RELATED OSTEOPOROSIS WITHOUT CURRENT PATHOLOGICAL FRACTURE: ICD-10-CM

## 2024-10-01 DIAGNOSIS — M25.551 PAIN OF RIGHT HIP: Primary | ICD-10-CM

## 2024-10-01 DIAGNOSIS — E55.9 VITAMIN D DEFICIENCY: ICD-10-CM

## 2024-10-01 DIAGNOSIS — H35.3213 EXUDATIVE AGE-RELATED MACULAR DEGENERATION, RIGHT EYE, WITH INACTIVE SCAR: ICD-10-CM

## 2024-10-01 DIAGNOSIS — E03.9 ADULT HYPOTHYROIDISM: ICD-10-CM

## 2024-10-01 PROCEDURE — G2211 COMPLEX E/M VISIT ADD ON: HCPCS | Performed by: INTERNAL MEDICINE

## 2024-10-01 PROCEDURE — 1036F TOBACCO NON-USER: CPT | Performed by: INTERNAL MEDICINE

## 2024-10-01 PROCEDURE — 1157F ADVNC CARE PLAN IN RCRD: CPT | Performed by: INTERNAL MEDICINE

## 2024-10-01 PROCEDURE — G0008 ADMIN INFLUENZA VIRUS VAC: HCPCS | Performed by: INTERNAL MEDICINE

## 2024-10-01 PROCEDURE — 1159F MED LIST DOCD IN RCRD: CPT | Performed by: INTERNAL MEDICINE

## 2024-10-01 PROCEDURE — 99214 OFFICE O/P EST MOD 30 MIN: CPT | Performed by: INTERNAL MEDICINE

## 2024-10-01 PROCEDURE — 90662 IIV NO PRSV INCREASED AG IM: CPT | Performed by: INTERNAL MEDICINE

## 2024-10-01 PROCEDURE — 1123F ACP DISCUSS/DSCN MKR DOCD: CPT | Performed by: INTERNAL MEDICINE

## 2024-10-01 RX ORDER — ALENDRONATE SODIUM 70 MG/1
70 TABLET ORAL
Qty: 4 TABLET | Refills: 11 | Status: SHIPPED | OUTPATIENT
Start: 2024-10-01 | End: 2025-10-01

## 2024-10-01 RX ORDER — DICLOFENAC SODIUM 10 MG/G
2 GEL TOPICAL 2 TIMES DAILY
Qty: 50 G | Refills: 1 | Status: SHIPPED | OUTPATIENT
Start: 2024-10-01

## 2024-10-01 ASSESSMENT — LIFESTYLE VARIABLES
HOW OFTEN DO YOU HAVE A DRINK CONTAINING ALCOHOL: NEVER
SKIP TO QUESTIONS 9-10: 1
AUDIT TOTAL SCORE: 0
HOW MANY STANDARD DRINKS CONTAINING ALCOHOL DO YOU HAVE ON A TYPICAL DAY: PATIENT DOES NOT DRINK
HAVE YOU OR SOMEONE ELSE BEEN INJURED AS A RESULT OF YOUR DRINKING: NO
AUDIT-C TOTAL SCORE: 0
HAS A RELATIVE, FRIEND, DOCTOR, OR ANOTHER HEALTH PROFESSIONAL EXPRESSED CONCERN ABOUT YOUR DRINKING OR SUGGESTED YOU CUT DOWN: NO
HOW OFTEN DO YOU HAVE SIX OR MORE DRINKS ON ONE OCCASION: NEVER

## 2024-10-01 NOTE — PROGRESS NOTES
Assessment/Plan     Problem List Items Addressed This Visit       Adult hypothyroidism     She will continue the levothyroxine 112 mcg and she is clinically euthyroid and her TSH is acceptable.         Essential hypertriglyceridemia     She will continue the atorvastatin and the cholesterol is acceptable.  At the age of 89 years there is no need for aggressive treatment.         Relevant Orders    Comprehensive Metabolic Panel    Hyperlipidemia     She will continue the atorvastatin and the cholesterol is acceptable.  At the age of 89 years there is no need for aggressive treatment.         Relevant Orders    CBC and Auto Differential    Vitamin D deficiency     She takes the oral vitamin D replacement and tolerates it.         Age-related osteoporosis without current pathological fracture     Patient has clinically osteoporosis due to her age, malnutrition and underweight.  The risk of accidental fall and the fractures were reviewed and discussed.  Patient will be started on biphosphonate's.  Side effects risk and benefits were reviewed and discussed.  I also discussed with the patient how to take the medicine as weekly early in the morning empty stomach and nothing for at least an hour which patient verbalizes the understanding.  Prescription was sent.  Patient also will have the bone density done.         Relevant Medications    alendronate (Fosamax) 70 mg tablet    diclofenac sodium (Voltaren) 1 % gel    Other Relevant Orders    XR DEXA bone density    Pain of right hip - Primary     This is a main complaint during this visit.  She is known to have right hip advanced arthritis as per the previous x-rays.  I advised the patient to keep the follow-up appointment with the orthopedic and if he offers to have further injection to talk to him about the last time limited relief and it is advisable to get the injection if it could be done.  At her age and other comorbid conditions she is not a candidate for hip  replacement or any other surgeries.  Till the injection is done we discussed about the treatment options.  She is high risk for any nonsteroidal anti-inflammatory medication and because of the diabetes we will avoid the oral steroids.  Will try local diclofenac gel to see whether that will at least decrease some pain.         Relevant Medications    diclofenac sodium (Voltaren) 1 % gel    Exudative age-related macular degeneration, right eye, with inactive scar       Subjective     Patient ID: Lupis Jackson is a 89 y.o. female who presents for Results.    History of present illness  89 years old female who lives independently came with a friend who is her main help and caregiver.  Patient has a nephew living with her but he works and patient says she does everything independently.    Since her last visit she had seen the endocrinologist and podiatric and the records are reviewed in detail patient will continue the insulin and her blood sugars are well-controlled she has had no episodes of hypoglycemia.    Patient had loose stools but she had seen the GI and she was started on cholestyramine which is controlling her bowels and she is happy.    She is complaining of right hip pain which also radiates to the right lower leg.  When asked she says the pain is mostly in the groin area.  She had seen the orthopedic about 3 months ago and she had the hip injection but she says it lasted for only 3 days.  She has a follow-up visit coming within 1 week and she wants to know whether to keep the appointment since it did not help her last time.    She denies any history of falls.  She is taking the supplement and she says that her appetite is fair.    Her dizziness is stable.    Her abdominal pain has improved and her surgical scar has well-healed.    She denies any depression and she is very social and happy.    Rest of the review of systems no acute complaints.    Family History   Problem Relation Name Age of Onset    No Known  Problems Mother      No Known Problems Father        Social History     Socioeconomic History    Marital status:      Spouse name: Not on file    Number of children: Not on file    Years of education: Not on file    Highest education level: Not on file   Occupational History    Not on file   Tobacco Use    Smoking status: Former     Current packs/day: 0.00     Types: Cigarettes     Quit date:      Years since quittin.7     Passive exposure: Past    Smokeless tobacco: Never   Vaping Use    Vaping status: Never Used   Substance and Sexual Activity    Alcohol use: Not Currently    Drug use: Never    Sexual activity: Defer   Other Topics Concern    Not on file   Social History Narrative    Not on file     Social Determinants of Health     Financial Resource Strain: Low Risk  (10/16/2023)    Overall Financial Resource Strain (CARDIA)     Difficulty of Paying Living Expenses: Not hard at all   Food Insecurity: Not on file   Transportation Needs: No Transportation Needs (2023)    OASIS : Transportation     Lack of Transportation (Medical): No     Lack of Transportation (Non-Medical): No     Patient Unable or Declines to Respond: No   Physical Activity: Not on file   Stress: Not on file   Social Connections: Feeling Socially Integrated (2023)    OASIS : Social Isolation     Frequency of experiencing loneliness or isolation: Rarely   Intimate Partner Violence: Not on file   Housing Stability: Low Risk  (10/16/2023)    Housing Stability Vital Sign     Unable to Pay for Housing in the Last Year: No     Number of Places Lived in the Last Year: 1     Unstable Housing in the Last Year: No      Patient has no known allergies.   Current Outpatient Medications   Medication Sig Dispense Refill    aspirin 81 mg EC tablet Take 1 tablet (81 mg) by mouth once daily.      atorvastatin (Lipitor) 10 mg tablet TAKE ONE TABLET BY MOUTH DAILY IN THE EVENING 90 tablet 3    cholecalciferol (Vitamin D-3) 50  "MCG (2000 UT) tablet Take 1 tablet (2,000 Units) by mouth once daily.      cholestyramine (Questran) 4 gram packet Take 1 packet (4 g) by mouth 2 times a day. Take other medications 1 hour before or 4-6 hours after this medication. 60 packet 2    Lactobacillus acidophilus (Probiotic Acidophilus) 250 million cell capsule Take 1 capsule by mouth once daily. 30 capsule 1    Lantus Solostar U-100 Insulin 100 unit/mL (3 mL) pen INJECT 8 UNITS SUBCUTANEOUSLY AT BEDTIME (Patient taking differently: Inject 6 Units under the skin once daily at bedtime.) 9 mL 0    levothyroxine (Synthroid, Levoxyl) 112 mcg tablet TAKE ONE TABLET BY MOUTH DAILY 90 tablet 3    meclizine (Antivert) 12.5 mg tablet TAKE ONE TABLET BY MOUTH EVERY 12 HOURS AS NEEDED 40 tablet 0    mv-calcium-min-iron fm-FA-vitK (One-A-Day Women's Complete,vK,) 18 mg-400 mcg- 25 mcg tablet Take 1 tablet by mouth once daily.      nitroglycerin (Nitrostat) 0.4 mg SL tablet Place 1 tablet (0.4 mg) under the tongue every 5 minutes if needed for chest pain.      nut.tx.gluc intol,lf,soy-fiber (Glucerna 1.5 Maynor) 0.08-1.5 gram-kcal/mL liquid Take 237 mL by mouth 2 times daily after breakfast and lunch. 4740 mL 3    OneTouch Ultra Test strip 1 strip every 12 hours.      pantoprazole (ProtoNix) 40 mg EC tablet TAKE ONE TABLET BY MOUTH DAILY. DO NOT CRUSH, CHEW OR SPLIT 90 tablet 1    PARoxetine (Paxil) 20 mg tablet Take 1 tablet (20 mg) by mouth once daily in the morning. 90 tablet 3    pen needle, diabetic (Sure Comfort Pen Needle) 32 gauge x 5/32\" needle USE ONE EVERY  each 2    vitamins A,C,E-zinc-copper (PreserVision AREDS) 2,148 mcg-113 mg-45 mg-17.4mg tablet Take 1 tablet by mouth once daily.      alendronate (Fosamax) 70 mg tablet Take 1 tablet (70 mg) by mouth every 7 days. Take in the morning with a full glass of water, on an empty stomach, and do not take anything else by mouth or lie down for the next 30 min. 4 tablet 11    diclofenac sodium (Voltaren) 1 % " gel Apply 2.25 inches (2 g) topically 2 times a day. 50 g 1     No current facility-administered medications for this visit.          Objective     Vitals:    10/01/24 1156   BP: 113/65   Pulse: 88        Physical Exam    Thin built pleasant elderly female,  with no apparent distress. Alert oriented  Has difficulty in hearing but able to communicate.  Skin:  Normal turgor.  No rash.  Head:  Normocephalic, atraumatic.  Eyes:  Pupils are equal, round,.  No pallor of conjunctivae.  Mouth has moist oral mucosa.   Neck:  Supple.  No JVD.  No carotid bruit.  No thyromegaly. No cervical lymphadenopathy.  No clubbing, significant peripheral osteoarthritis noted  Has kyphoscoliosis of the thoracic spine noted  Chest:  Vesicular breathing Bilaterally moderate air entry and bilaterally clear to auscultation.  No wheezing.  No crackles.  Heart:  Regular rate with intermittent ectopics and rhythm.  S1, S2 positive.  No murmur.  Abdomen:  Soft, no discomfort in the left lower quadrant.  Scar tissue and underlying skin has well-healed.  Bowel sounds are positive.  No organomegaly.  Extremities:  Bilaterally no pedal pitting edema.  Left hip movements are full, right hip movement is restricted due to pain.  Internal rotation of the hip is very painful and limited.  There is no warmness or redness in the groin or hip area.  Bilaterally 2+ dorsalis pedis pulses.  No calf tenderness. Homans sign is negative.  Neuro Exam: No focal signs.  Able to walk independently.    Problem List Items Addressed This Visit       Adult hypothyroidism     She will continue the levothyroxine 112 mcg and she is clinically euthyroid and her TSH is acceptable.         Essential hypertriglyceridemia     She will continue the atorvastatin and the cholesterol is acceptable.  At the age of 89 years there is no need for aggressive treatment.         Relevant Orders    Comprehensive Metabolic Panel    Hyperlipidemia     She will continue the atorvastatin and the  cholesterol is acceptable.  At the age of 89 years there is no need for aggressive treatment.         Relevant Orders    CBC and Auto Differential    Vitamin D deficiency     She takes the oral vitamin D replacement and tolerates it.         Age-related osteoporosis without current pathological fracture     Patient has clinically osteoporosis due to her age, malnutrition and underweight.  The risk of accidental fall and the fractures were reviewed and discussed.  Patient will be started on biphosphonate's.  Side effects risk and benefits were reviewed and discussed.  I also discussed with the patient how to take the medicine as weekly early in the morning empty stomach and nothing for at least an hour which patient verbalizes the understanding.  Prescription was sent.  Patient also will have the bone density done.         Relevant Medications    alendronate (Fosamax) 70 mg tablet    diclofenac sodium (Voltaren) 1 % gel    Other Relevant Orders    XR DEXA bone density    Pain of right hip - Primary     This is a main complaint during this visit.  She is known to have right hip advanced arthritis as per the previous x-rays.  I advised the patient to keep the follow-up appointment with the orthopedic and if he offers to have further injection to talk to him about the last time limited relief and it is advisable to get the injection if it could be done.  At her age and other comorbid conditions she is not a candidate for hip replacement or any other surgeries.  Till the injection is done we discussed about the treatment options.  She is high risk for any nonsteroidal anti-inflammatory medication and because of the diabetes we will avoid the oral steroids.  Will try local diclofenac gel to see whether that will at least decrease some pain.         Relevant Medications    diclofenac sodium (Voltaren) 1 % gel    Exudative age-related macular degeneration, right eye, with inactive scar        Orders Placed This Encounter    Procedures    XR DEXA bone density     Standing Status:   Future     Standing Expiration Date:   10/1/2025     Order Specific Question:   Reason for exam:     Answer:   osteoporosis     Order Specific Question:   Radiologist to Determine Optimal Study     Answer:   Yes     Order Specific Question:   Release result to [facility]     Answer:   Immediate [1]    Flu vaccine, trivalent, preservative free, HIGH-DOSE, age 65y+ (Fluzone)    CBC and Auto Differential     Standing Status:   Future     Standing Expiration Date:   2/1/2025     Order Specific Question:   Release result to [facility]     Answer:   Immediate    Comprehensive Metabolic Panel     Standing Status:   Future     Standing Expiration Date:   2/1/2025     Order Specific Question:   Release result to [facility]     Answer:   Immediate        Lab Results   Component Value Date    WBC 5.5 09/30/2024    HGB 12.4 09/30/2024    HCT 40.7 09/30/2024     09/30/2024    CHOL 136 02/02/2023    TRIG 163 (H) 04/07/2021    HDL 47.8 02/02/2023    ALT 9 09/30/2024    AST 14 09/30/2024     09/30/2024    K 4.5 09/30/2024     09/30/2024    CREATININE 0.85 09/30/2024    BUN 22 09/30/2024    CO2 26 09/30/2024    TSH 3.26 06/04/2024    INR 0.9 10/16/2023    HGBA1C 7.3 (H) 09/30/2024     Lab Results   Component Value Date    CHOL 136 02/02/2023    CHHDL 2.8 02/02/2023       No results found.

## 2024-10-01 NOTE — RESULT ENCOUNTER NOTE
Results are acceptable.  This was reviewed in office visit today.  Patient should follow-up with me as scheduled in 3 months with the new lab test as ordered.

## 2024-10-02 ENCOUNTER — HOSPITAL ENCOUNTER (OUTPATIENT)
Dept: RADIOLOGY | Facility: EXTERNAL LOCATION | Age: 89
Discharge: HOME | End: 2024-10-02

## 2024-10-02 ENCOUNTER — OFFICE VISIT (OUTPATIENT)
Dept: ORTHOPEDIC SURGERY | Facility: CLINIC | Age: 89
End: 2024-10-02
Payer: MEDICARE

## 2024-10-02 DIAGNOSIS — M16.11 PRIMARY OSTEOARTHRITIS OF RIGHT HIP: Primary | ICD-10-CM

## 2024-10-02 PROCEDURE — 99214 OFFICE O/P EST MOD 30 MIN: CPT | Performed by: EMERGENCY MEDICINE

## 2024-10-02 PROCEDURE — 1159F MED LIST DOCD IN RCRD: CPT | Performed by: EMERGENCY MEDICINE

## 2024-10-02 PROCEDURE — 1125F AMNT PAIN NOTED PAIN PRSNT: CPT | Performed by: EMERGENCY MEDICINE

## 2024-10-02 PROCEDURE — 1036F TOBACCO NON-USER: CPT | Performed by: EMERGENCY MEDICINE

## 2024-10-02 PROCEDURE — 20611 DRAIN/INJ JOINT/BURSA W/US: CPT | Performed by: EMERGENCY MEDICINE

## 2024-10-02 PROCEDURE — 1157F ADVNC CARE PLAN IN RCRD: CPT | Performed by: EMERGENCY MEDICINE

## 2024-10-02 PROCEDURE — 1123F ACP DISCUSS/DSCN MKR DOCD: CPT | Performed by: EMERGENCY MEDICINE

## 2024-10-02 RX ORDER — LIDOCAINE HYDROCHLORIDE 10 MG/ML
4 INJECTION, SOLUTION INFILTRATION; PERINEURAL
Status: COMPLETED | OUTPATIENT
Start: 2024-10-02 | End: 2024-10-02

## 2024-10-02 RX ORDER — METHYLPREDNISOLONE ACETATE 40 MG/ML
80 INJECTION, SUSPENSION INTRA-ARTICULAR; INTRALESIONAL; INTRAMUSCULAR; SOFT TISSUE
Status: COMPLETED | OUTPATIENT
Start: 2024-10-02 | End: 2024-10-02

## 2024-10-02 ASSESSMENT — PAIN - FUNCTIONAL ASSESSMENT: PAIN_FUNCTIONAL_ASSESSMENT: 0-10

## 2024-10-02 ASSESSMENT — PAIN SCALES - GENERAL: PAINLEVEL_OUTOF10: 6

## 2024-10-02 NOTE — ASSESSMENT & PLAN NOTE
She will continue the levothyroxine 112 mcg and she is clinically euthyroid and her TSH is acceptable.

## 2024-10-02 NOTE — ASSESSMENT & PLAN NOTE
Patient has clinically osteoporosis due to her age, malnutrition and underweight.  The risk of accidental fall and the fractures were reviewed and discussed.  Patient will be started on biphosphonate's.  Side effects risk and benefits were reviewed and discussed.  I also discussed with the patient how to take the medicine as weekly early in the morning empty stomach and nothing for at least an hour which patient verbalizes the understanding.  Prescription was sent.  Patient also will have the bone density done.

## 2024-10-02 NOTE — PROGRESS NOTES
Subjective    Patient ID: Lupis Jackson is a 89 y.o. female.    Chief Complaint: Pain and Follow-up of the Right Hip (Had CSI 4/10/24 States injection only lasted about 1 week.  Not sure if she wants another injection wants to discuss it or if she should see someone else. )     Last Surgery: No surgery found  Last Surgery Date: No surgery found    Lupis, is an extremely pleasant, very healthy, 89-year-old female coming in with some acute on chronic pain in her bilateral hips and right knee.  She states that she has had pain for years but that her pain has been much worse for the past 6 months.  She was actually referred here by her podiatrist Dr. Gutierrez for nonoperative evaluation and management.  Most of her pain here today is located in the right groin area and radiates laterally.  She also has pain radiating down toward and involving her right knee.  She has some mild discomfort in her left groin area that is worse with activity.  Overall she is doing very well and says that she takes a combination of Excedrin and Tylenol on an occasional basis that do both help with her symptoms.  She has never had a cortisone injection and at 89 years old does not want surgery. We agreed for her to begin using prescription dose Tylenol at 1000 mg up to 3 times daily as needed for her pain and also agreed to perform an ultrasound-guided cortisone injection of her right hip joint here today in the office.  She tolerated the procedure well without any complications.  She is a  diabetic so she knows to monitor her sugars over the next week or so after this injection.  Activity modifications were reviewed.  She will follow-up with me in about 2 weeks to determine her response to this plan and whether or not she needs a left hip and or right knee injection.    Update on 4/24/24. Lupis is coming back in for a follow up visit. She is about 2w out from a R hip CSI and is about 50% better and is very pleased that her pain has improved. No other  complaints here today. Not having pain in L hip or R knee. We agreed for her to continue using prescription dose Tylenol at 1000 mg up to 3 times daily as needed for her pain. She will follow up with me in about 2mo for likely repeat R hip CSI. Of note, she is still very active and yesterday was helping her neighbor cut down a small tree.     Update on 10/2/24. Lupis is come back in for a follow-up visit.  She is again having right groin pain that radiates down her leg.  The injection that we performed back on 4/10/2024, only lasted for a week or 2 but she would like to try it again because she has been told that she is not a surgical candidate because of her age.  She is also interested in seeing pain management.  She is here today with her daughter who is helping provide additional history.  No infectious symptoms.  No recent traumatic events or known injuries.        Objective   Right Knee Exam     Muscle Strength   The patient has normal right knee strength.    Tenderness   The patient is experiencing no tenderness.     Range of Motion   The patient has normal right knee ROM.    Other   Erythema: absent  Sensation: normal  Pulse: present  Swelling: none  Effusion: no effusion present    Comments:  Mild patellar crepitus with knee range of motion testing.      Left Knee Exam   Left knee exam is normal.      Right Hip Exam     Tenderness   The patient is experiencing no tenderness.     Range of Motion   External rotation:  abnormal   Internal rotation:  abnormal     Muscle Strength   The patient has normal right hip strength.    Tests   ART: positive    Other   Erythema: absent  Sensation: normal  Pulse: present    Comments:  Positive FADIR, logroll, and ART.  No tenderness to palpation over the greater trochanter.      Left Hip Exam     Tenderness   The patient is experiencing no tenderness.     Range of Motion   The patient has normal left hip ROM.    Muscle Strength   The patient has normal left hip strength.      Tests   ART: negative    Other   Sensation: normal  Pulse: present            Image Results:  No new images    Patient ID: Lupis Jackson is a 89 y.o. female.    L Inj/Asp: R hip joint on 10/2/2024 1:36 PM  Indications: pain  Details: 20 G needle, ultrasound-guided anterior approach  Medications: 80 mg methylPREDNISolone acetate 40 mg/mL; 4 mL lidocaine 10 mg/mL (1 %)  Outcome: tolerated well, no immediate complications  Procedure, treatment alternatives, risks and benefits explained, specific risks discussed. Consent was given by the patient. Immediately prior to procedure a time out was called to verify the correct patient, procedure, equipment, support staff and site/side marked as required. Patient was prepped and draped in the usual sterile fashion.           Assessment/Plan   Encounter Diagnoses:  Primary osteoarthritis of right hip    Orders Placed This Encounter    L Inj/Asp    Point of Care Ultrasound    Referral to Physical Medicine Rehab     No follow-ups on file.    We discussed her treatment options and agreed to perform a right hip cortisone injection under ultrasound guidance.  The patient tolerated the procedure well without any complications and activity modifications were reviewed.  She is going to follow-up with me as needed moving forward and we are also going to refer her to see one of my pain management colleagues to see if there is anything else that he can provide to help with her discomfort.  Some of her pain might be coming from her lower back despite her known severe right hip osteoarthritis.    ** Please excuse any errors in grammar or translation related to this dictation. Voice recognition software was utilized to prepare this document. **       Rick Whitlock MD  [facility]

## 2024-10-02 NOTE — ASSESSMENT & PLAN NOTE
She will continue the atorvastatin and the cholesterol is acceptable.  At the age of 89 years there is no need for aggressive treatment.

## 2024-10-04 DIAGNOSIS — E10.69 TYPE 1 DIABETES MELLITUS WITH OTHER SPECIFIED COMPLICATION: Primary | ICD-10-CM

## 2024-10-07 RX ORDER — BLOOD SUGAR DIAGNOSTIC
STRIP MISCELLANEOUS 2 TIMES DAILY
Qty: 200 EACH | Refills: 1 | Status: SHIPPED | OUTPATIENT
Start: 2024-10-07

## 2024-10-09 ENCOUNTER — HOSPITAL ENCOUNTER (OUTPATIENT)
Dept: RADIOLOGY | Facility: HOSPITAL | Age: 89
Discharge: HOME | End: 2024-10-09
Payer: MEDICARE

## 2024-10-09 DIAGNOSIS — M81.0 AGE-RELATED OSTEOPOROSIS WITHOUT CURRENT PATHOLOGICAL FRACTURE: ICD-10-CM

## 2024-10-09 PROCEDURE — 77080 DXA BONE DENSITY AXIAL: CPT | Performed by: RADIOLOGY

## 2024-10-09 PROCEDURE — 77080 DXA BONE DENSITY AXIAL: CPT

## 2024-10-09 NOTE — RESULT ENCOUNTER NOTE
Patient has osteoporosis as per the bone density.  Please advise patient to continue to take the medication once a week as prescribed.  If there is any concern she should call us back otherwise she can see me for the scheduled appointment.

## 2024-10-10 ENCOUNTER — TELEPHONE (OUTPATIENT)
Dept: PRIMARY CARE | Facility: CLINIC | Age: 89
End: 2024-10-10
Payer: MEDICARE

## 2024-10-10 NOTE — TELEPHONE ENCOUNTER
----- Message from Colton Menjivar sent at 10/9/2024  3:52 PM EDT -----  Patient has osteoporosis as per the bone density.  Please advise patient to continue to take the medication once a week as prescribed.  If there is any concern she should call us back otherwise she can see me for the scheduled appointment.

## 2024-10-29 DIAGNOSIS — Z79.4 TYPE 2 DIABETES MELLITUS WITH OTHER SPECIFIED COMPLICATION, WITH LONG-TERM CURRENT USE OF INSULIN: ICD-10-CM

## 2024-10-29 DIAGNOSIS — E11.69 TYPE 2 DIABETES MELLITUS WITH OTHER SPECIFIED COMPLICATION, WITH LONG-TERM CURRENT USE OF INSULIN: ICD-10-CM

## 2024-10-29 RX ORDER — INSULIN GLARGINE 100 [IU]/ML
6 INJECTION, SOLUTION SUBCUTANEOUS NIGHTLY
Qty: 15 ML | Refills: 0 | Status: SHIPPED | OUTPATIENT
Start: 2024-10-29

## 2024-11-01 ENCOUNTER — APPOINTMENT (OUTPATIENT)
Dept: CARDIOLOGY | Facility: CLINIC | Age: 89
End: 2024-11-01
Payer: MEDICARE

## 2024-11-22 ENCOUNTER — TELEPHONE (OUTPATIENT)
Dept: PRIMARY CARE | Facility: CLINIC | Age: 89
End: 2024-11-22
Payer: MEDICARE

## 2024-11-22 NOTE — TELEPHONE ENCOUNTER
Patient is having severe swelling in her right leg. She says that she can hear her bones creaking in the leg when she tries to walk on it and wondered if she should go to the ER. I advised her to go to the ER as soon as possible to get checked out. She said that she will do so.

## 2024-11-23 ENCOUNTER — APPOINTMENT (OUTPATIENT)
Dept: RADIOLOGY | Facility: HOSPITAL | Age: 89
End: 2024-11-23
Payer: MEDICARE

## 2024-11-23 ENCOUNTER — HOSPITAL ENCOUNTER (EMERGENCY)
Facility: HOSPITAL | Age: 89
Discharge: HOME | End: 2024-11-23
Attending: EMERGENCY MEDICINE
Payer: MEDICARE

## 2024-11-23 ENCOUNTER — APPOINTMENT (OUTPATIENT)
Dept: CARDIOLOGY | Facility: HOSPITAL | Age: 89
End: 2024-11-23
Payer: MEDICARE

## 2024-11-23 VITALS
RESPIRATION RATE: 18 BRPM | OXYGEN SATURATION: 96 % | TEMPERATURE: 97.3 F | SYSTOLIC BLOOD PRESSURE: 123 MMHG | WEIGHT: 129 LBS | BODY MASS INDEX: 20.73 KG/M2 | DIASTOLIC BLOOD PRESSURE: 69 MMHG | HEART RATE: 54 BPM | HEIGHT: 66 IN

## 2024-11-23 DIAGNOSIS — R60.0 LOCALIZED EDEMA: ICD-10-CM

## 2024-11-23 DIAGNOSIS — M16.11 PRIMARY OSTEOARTHRITIS OF RIGHT HIP: Primary | ICD-10-CM

## 2024-11-23 PROCEDURE — 99285 EMERGENCY DEPT VISIT HI MDM: CPT | Mod: 25

## 2024-11-23 PROCEDURE — 73564 X-RAY EXAM KNEE 4 OR MORE: CPT | Mod: RT

## 2024-11-23 PROCEDURE — 93971 EXTREMITY STUDY: CPT | Performed by: INTERNAL MEDICINE

## 2024-11-23 PROCEDURE — 73564 X-RAY EXAM KNEE 4 OR MORE: CPT | Mod: RIGHT SIDE | Performed by: RADIOLOGY

## 2024-11-23 PROCEDURE — 73700 CT LOWER EXTREMITY W/O DYE: CPT | Mod: RIGHT SIDE | Performed by: RADIOLOGY

## 2024-11-23 PROCEDURE — 73700 CT LOWER EXTREMITY W/O DYE: CPT | Mod: RT

## 2024-11-23 PROCEDURE — 93971 EXTREMITY STUDY: CPT

## 2024-11-23 PROCEDURE — 73502 X-RAY EXAM HIP UNI 2-3 VIEWS: CPT | Mod: RIGHT SIDE | Performed by: RADIOLOGY

## 2024-11-23 PROCEDURE — 2500000001 HC RX 250 WO HCPCS SELF ADMINISTERED DRUGS (ALT 637 FOR MEDICARE OP)

## 2024-11-23 PROCEDURE — 73502 X-RAY EXAM HIP UNI 2-3 VIEWS: CPT | Mod: RT

## 2024-11-23 RX ORDER — ACETAMINOPHEN 325 MG/1
975 TABLET ORAL ONCE
Status: COMPLETED | OUTPATIENT
Start: 2024-11-23 | End: 2024-11-23

## 2024-11-23 RX ORDER — IBUPROFEN 600 MG/1
600 TABLET ORAL ONCE
Status: COMPLETED | OUTPATIENT
Start: 2024-11-23 | End: 2024-11-23

## 2024-11-23 RX ADMIN — ACETAMINOPHEN 325MG 975 MG: 325 TABLET ORAL at 16:03

## 2024-11-23 RX ADMIN — IBUPROFEN 600 MG: 600 TABLET, FILM COATED ORAL at 16:03

## 2024-11-23 ASSESSMENT — PAIN DESCRIPTION - PAIN TYPE
TYPE: ACUTE PAIN
TYPE: ACUTE PAIN

## 2024-11-23 ASSESSMENT — PAIN DESCRIPTION - LOCATION
LOCATION: LEG
LOCATION: LEG

## 2024-11-23 ASSESSMENT — PAIN DESCRIPTION - ORIENTATION
ORIENTATION: RIGHT

## 2024-11-23 ASSESSMENT — PAIN SCALES - GENERAL
PAINLEVEL_OUTOF10: 10 - WORST POSSIBLE PAIN
PAINLEVEL_OUTOF10: 4
PAINLEVEL_OUTOF10: 7

## 2024-11-23 ASSESSMENT — PAIN DESCRIPTION - PROGRESSION: CLINICAL_PROGRESSION: GRADUALLY IMPROVING

## 2024-11-23 ASSESSMENT — COLUMBIA-SUICIDE SEVERITY RATING SCALE - C-SSRS
1. IN THE PAST MONTH, HAVE YOU WISHED YOU WERE DEAD OR WISHED YOU COULD GO TO SLEEP AND NOT WAKE UP?: NO
2. HAVE YOU ACTUALLY HAD ANY THOUGHTS OF KILLING YOURSELF?: NO
6. HAVE YOU EVER DONE ANYTHING, STARTED TO DO ANYTHING, OR PREPARED TO DO ANYTHING TO END YOUR LIFE?: NO

## 2024-11-23 ASSESSMENT — PAIN - FUNCTIONAL ASSESSMENT
PAIN_FUNCTIONAL_ASSESSMENT: 0-10
PAIN_FUNCTIONAL_ASSESSMENT: 0-10

## 2024-11-23 NOTE — ED PROVIDER NOTES
Emergency Medicine Transition of Care Note.    I received Lupis Jackson in signout from Dr. Schwab.  Please see the previous ED provider note for all HPI, PE and MDM up to the time of signout at 1750. This is in addition to the primary record.    In brief Lupis Jackson is an 89 y.o. female presenting for   Chief Complaint   Patient presents with    Leg Pain     Pt states her right leg began to hurt yesterday. Denies fall or trauma to the area. Right ankle slightly swollen.      At the time of signout we were awaiting: CT femur results.    ED Course as of 11/23/24 1828   Sat Nov 23, 2024 1827 CT hip right wo IV contrast  IMPRESSION:  No acute fracture or dislocation. Severe osteoarthritis of the right  hip and small right hip joint effusion present.      There is a smoothly marginated slight contour irregularity of the  inferomedial right acetabulum which likely accounts for the  radiographic finding and does not appear to be an acute fracture or  an osseous lesion. No definite osseous lesion involving the  visualized superior right pubic ramus.           [MI]   1827 No acute injury noted on CT.  Significant osteoarthritis of the right hip noted.  Patient be discharged with outpatient orthopedic follow-up.  Patient able to ambulate well at this time.  Diagnostic findings and treatment plan discussed with patient.  Patient amenable to plan. [MI]      ED Course User Index  [MI] Breezy Corral MD         Diagnoses as of 11/23/24 1828   Primary osteoarthritis of right hip       Medical Decision Making      Final diagnoses:   [M16.11] Primary osteoarthritis of right hip           Procedure  Procedures    MD Breezy Holder MD  Resident  11/23/24 1828

## 2024-11-23 NOTE — DISCHARGE INSTRUCTIONS
Please follow-up with orthopedic surgery, following your visit.  Please return to closest ED for any worsening symptoms such as numbness/tingling of the extremities, weakness, or loss of function.

## 2024-11-23 NOTE — ED PROVIDER NOTES
"EMERGENCY DEPARTMENT ENCOUNTER      Pt Name: Lupis Jackson  MRN: [id number]  Birthdate 3/14/1926  Date of evaluation: 11/23/2024  Provider: Andrea Schwab DO    CHIEF COMPLAINT       Chief Complaint   Patient presents with    Leg Pain     Pt states her right leg began to hurt yesterday. Denies fall or trauma to the area. Right ankle slightly swollen.          HISTORY OF PRESENT ILLNESS    HPI    89-year-old female presenting to the emergency department for evaluation of 1 month of right sided leg pain and swelling.  Patient states she has had progressively worsening pain in her right leg over the past month which has gotten to the point where she is having trouble bearing weight due to pain.  Reports \"creaking\" sound when walking.  Also reports noticing swelling from the mid shin down into the foot over the past several days.  Reports pain is in the entire extremity including the hip, knee, ankle and foot.  No prior history of DVT or PE does have a history of osteoporosis and severe osteoarthritis of the right hip as seen on x-ray of the right hip back in April of this year.  States she has a history of breast cancer and colorectal adenocarcinoma status post resection currently in remission.  Patient denies back pain.  No fevers, chills, night sweats.  No prior back, hip or knee surgeries.    Nursing Notes were reviewed.    PAST MEDICAL HISTORY     Past Medical History:   Diagnosis Date    Abnormal mammogram 10/09/2023    Breast cancer (Multi)     Cataract     Colorectal cancer (Multi)     Coronary artery disease     Dizziness     Essential (primary) hypertension 04/07/2022    Essential hypertension    HL (hearing loss)     Hyperlipidemia, unspecified 07/26/2021    Hyperlipidemia    Hypothyroidism     Myocardial infarction (Multi)     Presence of coronary angioplasty implant and graft 07/27/2020    History of coronary artery stent placement    Type 2 diabetes mellitus without complications (Multi) 12/15/2022    " Diabetes mellitus    Vitamin D deficiency, unspecified 04/22/2019    Vitamin D deficiency         SURGICAL HISTORY       Past Surgical History:   Procedure Laterality Date    CATARACT EXTRACTION      COLON SURGERY      COLONOSCOPY      OTHER SURGICAL HISTORY  12/10/2018    Right mastectomy         CURRENT MEDICATIONS       Previous Medications    ALENDRONATE (FOSAMAX) 70 MG TABLET    Take 1 tablet (70 mg) by mouth every 7 days. Take in the morning with a full glass of water, on an empty stomach, and do not take anything else by mouth or lie down for the next 30 min.    ASPIRIN 81 MG EC TABLET    Take 1 tablet (81 mg) by mouth once daily.    ATORVASTATIN (LIPITOR) 10 MG TABLET    TAKE ONE TABLET BY MOUTH DAILY IN THE EVENING    BLOOD SUGAR DIAGNOSTIC (ONETOUCH ULTRA TEST) STRIP    TEST TWICE DAILY    CHOLECALCIFEROL (VITAMIN D-3) 50 MCG (2000 UT) TABLET    Take 1 tablet (2,000 Units) by mouth once daily.    CHOLESTYRAMINE (QUESTRAN) 4 GRAM PACKET    Take 1 packet (4 g) by mouth 2 times a day. Take other medications 1 hour before or 4-6 hours after this medication.    DICLOFENAC SODIUM (VOLTAREN) 1 % GEL    Apply 2.25 inches (2 g) topically 2 times a day.    INSULIN GLARGINE (LANTUS SOLOSTAR U-100 INSULIN) 100 UNIT/ML (3 ML) PEN    Inject 6 Units under the skin once daily at bedtime.    LACTOBACILLUS ACIDOPHILUS (PROBIOTIC ACIDOPHILUS) 250 MILLION CELL CAPSULE    Take 1 capsule by mouth once daily.    LEVOTHYROXINE (SYNTHROID, LEVOXYL) 112 MCG TABLET    TAKE ONE TABLET BY MOUTH DAILY    MECLIZINE (ANTIVERT) 12.5 MG TABLET    TAKE ONE TABLET BY MOUTH EVERY 12 HOURS AS NEEDED    MV-CALCIUM-MIN-IRON FM-FA-VITK (ONE-A-DAY WOMEN'S COMPLETE,VK,) 18 MG-400 MCG- 25 MCG TABLET    Take 1 tablet by mouth once daily.    NITROGLYCERIN (NITROSTAT) 0.4 MG SL TABLET    Place 1 tablet (0.4 mg) under the tongue every 5 minutes if needed for chest pain.    NUT.TX.GLUC INTOL,LF,SOY-FIBER (GLUCERNA 1.5 SUZANNE) 0.08-1.5 GRAM-KCAL/ML LIQUID     "Take 237 mL by mouth 2 times daily after breakfast and lunch.    PANTOPRAZOLE (PROTONIX) 40 MG EC TABLET    TAKE ONE TABLET BY MOUTH DAILY. DO NOT CRUSH, CHEW OR SPLIT    PAROXETINE (PAXIL) 20 MG TABLET    Take 1 tablet (20 mg) by mouth once daily in the morning.    PEN NEEDLE, DIABETIC (SURE COMFORT PEN NEEDLE) 32 GAUGE X \" NEEDLE    USE ONE EVERY DAY    VITAMINS A,C,E-ZINC-COPPER (PRESERVISION AREDS) 2,148 MCG-113 MG-45 MG-17.4MG TABLET    Take 1 tablet by mouth once daily.       ALLERGIES     Patient has no known allergies.    FAMILY HISTORY       Family History   Problem Relation Name Age of Onset    No Known Problems Mother      No Known Problems Father            SOCIAL HISTORY       Social History     Socioeconomic History    Marital status:    Tobacco Use    Smoking status: Former     Current packs/day: 0.00     Types: Cigarettes     Quit date:      Years since quittin.9     Passive exposure: Past    Smokeless tobacco: Never   Vaping Use    Vaping status: Never Used   Substance and Sexual Activity    Alcohol use: Not Currently    Drug use: Never    Sexual activity: Defer     Social Drivers of Health     Financial Resource Strain: Low Risk  (10/16/2023)    Overall Financial Resource Strain (CARDIA)     Difficulty of Paying Living Expenses: Not hard at all   Transportation Needs: No Transportation Needs (2023)    OASIS : Transportation     Lack of Transportation (Medical): No     Lack of Transportation (Non-Medical): No     Patient Unable or Declines to Respond: No   Social Connections: Feeling Socially Integrated (2023)    OASIS : Social Isolation     Frequency of experiencing loneliness or isolation: Rarely   Housing Stability: Low Risk  (10/16/2023)    Housing Stability Vital Sign     Unable to Pay for Housing in the Last Year: No     Number of Places Lived in the Last Year: 1     Unstable Housing in the Last Year: No       SCREENINGS                        PHYSICAL " EXAM    (up to 7 for level 4, 8 or more for level 5)     ED Triage Vitals [11/23/24 1505]   Temperature Heart Rate Respirations BP   36.3 °C (97.3 °F) 88 18 111/59      Pulse Ox Temp Source Heart Rate Source Patient Position   95 % Temporal Monitor Sitting      BP Location FiO2 (%)     Right arm --       Physical Exam  Vitals and nursing note reviewed.   Constitutional:       General: She is not in acute distress.     Appearance: Normal appearance. She is not ill-appearing, toxic-appearing or diaphoretic.   HENT:      Head: Normocephalic and atraumatic.      Left Ear: External ear normal.      Nose: Nose normal.      Mouth/Throat:      Pharynx: Oropharynx is clear.   Eyes:      Conjunctiva/sclera: Conjunctivae normal.   Cardiovascular:      Rate and Rhythm: Normal rate and regular rhythm.      Pulses: Normal pulses.      Heart sounds: Normal heart sounds.   Pulmonary:      Effort: Pulmonary effort is normal.      Breath sounds: Normal breath sounds.   Abdominal:      General: Abdomen is flat.      Palpations: Abdomen is soft.   Musculoskeletal:      Cervical back: Normal range of motion and neck supple. No rigidity or tenderness.      Comments: No midline spinal tenderness, step-offs or deformities.  Patient has moderate thoracic scoliosis.  No bony tenderness however pain is reproduced on flexion of the right hip and right knee.  No obvious effusion or deformity.  No palpable crepitus.  Compartments soft.  1+ nonpitting edema extending from the distal third of the tibia down into the foot and ankle.   Skin:     General: Skin is warm and dry.      Coloration: Skin is not jaundiced or pale.      Findings: No bruising, erythema, lesion or rash.   Neurological:      General: No focal deficit present.      Mental Status: She is alert and oriented to person, place, and time.   Psychiatric:         Mood and Affect: Mood normal.         Behavior: Behavior normal.          DIAGNOSTIC RESULTS     LABS:  Labs Reviewed - No  data to display    All other labs were within normal range or not returned as of this dictation.    Imaging  XR hip right with pelvis when performed 2 or 3 views    (Results Pending)   XR knee right 4+ views    (Results Pending)   Vascular US lower extremity venous duplex right    (Results Pending)        Procedures  Procedures     EMERGENCY DEPARTMENT COURSE/MDM:     ED Course as of 11/23/24 1839   Sat Nov 23, 2024   1827 CT hip right wo IV contrast  IMPRESSION:  No acute fracture or dislocation. Severe osteoarthritis of the right  hip and small right hip joint effusion present.      There is a smoothly marginated slight contour irregularity of the  inferomedial right acetabulum which likely accounts for the  radiographic finding and does not appear to be an acute fracture or  an osseous lesion. No definite osseous lesion involving the  visualized superior right pubic ramus.           [MI]   1827 No acute injury noted on CT.  Significant osteoarthritis of the right hip noted.  Patient be discharged with outpatient orthopedic follow-up.  Patient able to ambulate well at this time.  Diagnostic findings and treatment plan discussed with patient.  Patient amenable to plan. [MI]      ED Course User Index  [MI] Breezy Corral MD         Diagnoses as of 11/23/24 1839   Primary osteoarthritis of right hip        Medical Decision Making    89-year-old female presenting to the emergency department for evaluation of 1 month of right sided leg pain and swelling.  Hemodynamically stable, no acute distress, nontoxic-appearing, afebrile.  Tylenol and Motrin ordered for pain. X-ray significant for age-indeterminate healing fracture of the right fibular neck.  Very small unchanged joint effusion of the right knee.  Severe osteoarthritis of the right hip with cortical irregularity of the medial right acetabular wall possibly suggestive of nondisplaced acetabular fracture as well as cortical irregularity in the superior aspect of  the right pubic ramus.  CT of the right hip ordered to further evaluate for possible nondisplaced fractures particularly of the right acetabulum.    Patient signed out to Dr. Corral pending CT scan likely plan for discharge possibly with orthopedic follow-up based off of CT results.    Patient and or family in agreement and understanding of treatment plan.  All questions answered.      I reviewed the case with the attending ED physician. The attending ED physician agrees with the plan. Patient and/or patient´s representative was counseled regarding labs, imaging, likely diagnosis, and plan. All questions were answered.    ED Medications administered this visit:    Medications   acetaminophen (Tylenol) tablet 975 mg (has no administration in time range)   ibuprofen tablet 600 mg (has no administration in time range)       New Prescriptions from this visit:    New Prescriptions    No medications on file       Follow-up:  No follow-up provider specified.      Final Impression: No diagnosis found.      (Please note that portions of this note were completed with a voice recognition program.  Efforts were made to edit the dictations but occasionally words are mis-transcribed.)     Andrea Schwab,   Resident  11/23/24 1510

## 2024-11-27 ENCOUNTER — APPOINTMENT (OUTPATIENT)
Dept: ORTHOPEDIC SURGERY | Facility: CLINIC | Age: 89
End: 2024-11-27
Payer: MEDICARE

## 2024-11-27 DIAGNOSIS — M16.11 PRIMARY OSTEOARTHRITIS OF RIGHT HIP: ICD-10-CM

## 2024-11-27 PROCEDURE — 1036F TOBACCO NON-USER: CPT | Performed by: STUDENT IN AN ORGANIZED HEALTH CARE EDUCATION/TRAINING PROGRAM

## 2024-11-27 PROCEDURE — 99204 OFFICE O/P NEW MOD 45 MIN: CPT | Performed by: STUDENT IN AN ORGANIZED HEALTH CARE EDUCATION/TRAINING PROGRAM

## 2024-11-27 PROCEDURE — 1123F ACP DISCUSS/DSCN MKR DOCD: CPT | Performed by: STUDENT IN AN ORGANIZED HEALTH CARE EDUCATION/TRAINING PROGRAM

## 2024-11-27 PROCEDURE — 1157F ADVNC CARE PLAN IN RCRD: CPT | Performed by: STUDENT IN AN ORGANIZED HEALTH CARE EDUCATION/TRAINING PROGRAM

## 2024-11-27 PROCEDURE — 1159F MED LIST DOCD IN RCRD: CPT | Performed by: STUDENT IN AN ORGANIZED HEALTH CARE EDUCATION/TRAINING PROGRAM

## 2024-11-27 PROCEDURE — 1160F RVW MEDS BY RX/DR IN RCRD: CPT | Performed by: STUDENT IN AN ORGANIZED HEALTH CARE EDUCATION/TRAINING PROGRAM

## 2024-11-27 NOTE — PROGRESS NOTES
Department of Orthopaedic Surgery  Division of Adult Reconstruction    Chief Complaint: Right hip pain    HPI:  Lupis Jackson is a pleasant 89 y.o. year-old female with history of diabetes (last A1c 7.3), CAD, and osteoporosis who is seen today for evaluation of right hip pain.  Patient reports pain deep in the right groin for the last several years.  She also complains of low back pain knee pain, and ankle pain in the right lower extremity.  It is aggravated by activity and relieved with rest.  She has tried several nonoperative measures including activity modification, over-the-counter medication, corticosteroid injection.  Her last injection was one month ago and provided some relief for 3 days. She lives with her nephew. She has an appointment in December with pain management.     Review of Symptoms:  The patient denies any fever, chills, chest pain, shortness of breath or difficulty breathing.  Patient denies any numbness, tingling, or radicular symptoms.      Adult patient history sheet was filled out by the patient today in clinic and will be scanned into the EMR.  I personally reviewed this form which will be scanned into the EMR.  This includes Past Medical History, Past Surgical History, Medications, Allergies, Social History, Family History and 12 point review of systems.    Past Medical History:  Past Medical History:   Diagnosis Date    Abnormal mammogram 10/09/2023    Breast cancer (Multi)     Cataract     Colorectal cancer (Multi)     Coronary artery disease     Dizziness     Essential (primary) hypertension 04/07/2022    Essential hypertension    HL (hearing loss)     Hyperlipidemia, unspecified 07/26/2021    Hyperlipidemia    Hypothyroidism     Myocardial infarction (Multi)     Presence of coronary angioplasty implant and graft 07/27/2020    History of coronary artery stent placement    Type 2 diabetes mellitus without complications (Multi) 12/15/2022    Diabetes mellitus    Vitamin D deficiency,  unspecified 04/22/2019    Vitamin D deficiency       Past Surgical History:  Past Surgical History:   Procedure Laterality Date    CATARACT EXTRACTION      COLON SURGERY      COLONOSCOPY      OTHER SURGICAL HISTORY  12/10/2018    Right mastectomy     Allergies:  NKDA    Medications:  Current Outpatient Medications on File Prior to Visit   Medication Sig Dispense Refill    alendronate (Fosamax) 70 mg tablet Take 1 tablet (70 mg) by mouth every 7 days. Take in the morning with a full glass of water, on an empty stomach, and do not take anything else by mouth or lie down for the next 30 min. 4 tablet 11    aspirin 81 mg EC tablet Take 1 tablet (81 mg) by mouth once daily.      atorvastatin (Lipitor) 10 mg tablet TAKE ONE TABLET BY MOUTH DAILY IN THE EVENING 90 tablet 3    blood sugar diagnostic (OneTouch Ultra Test) strip TEST TWICE DAILY 200 each 1    cholecalciferol (Vitamin D-3) 50 MCG (2000 UT) tablet Take 1 tablet (2,000 Units) by mouth once daily.      cholestyramine (Questran) 4 gram packet Take 1 packet (4 g) by mouth 2 times a day. Take other medications 1 hour before or 4-6 hours after this medication. 60 packet 2    diclofenac sodium (Voltaren) 1 % gel Apply 2.25 inches (2 g) topically 2 times a day. 50 g 1    insulin glargine (Lantus Solostar U-100 Insulin) 100 unit/mL (3 mL) pen Inject 6 Units under the skin once daily at bedtime. 15 mL 0    Lactobacillus acidophilus (Probiotic Acidophilus) 250 million cell capsule Take 1 capsule by mouth once daily. 30 capsule 1    levothyroxine (Synthroid, Levoxyl) 112 mcg tablet TAKE ONE TABLET BY MOUTH DAILY 90 tablet 3    meclizine (Antivert) 12.5 mg tablet TAKE ONE TABLET BY MOUTH EVERY 12 HOURS AS NEEDED 40 tablet 0    mv-calcium-min-iron fm-FA-vitK (One-A-Day Women's Complete,vK,) 18 mg-400 mcg- 25 mcg tablet Take 1 tablet by mouth once daily.      nitroglycerin (Nitrostat) 0.4 mg SL tablet Place 1 tablet (0.4 mg) under the tongue every 5 minutes if needed for  "chest pain.      nut.tx.gluc intol,lf,soy-fiber (Glucerna 1.5 Maynor) 0.08-1.5 gram-kcal/mL liquid Take 237 mL by mouth 2 times daily after breakfast and lunch. 4740 mL 3    pantoprazole (ProtoNix) 40 mg EC tablet TAKE ONE TABLET BY MOUTH DAILY. DO NOT CRUSH, CHEW OR SPLIT 90 tablet 1    PARoxetine (Paxil) 20 mg tablet Take 1 tablet (20 mg) by mouth once daily in the morning. 90 tablet 3    pen needle, diabetic (Sure Comfort Pen Needle) 32 gauge x 5/32\" needle USE ONE EVERY  each 2    vitamins A,C,E-zinc-copper (PreserVision AREDS) 2,148 mcg-113 mg-45 mg-17.4mg tablet Take 1 tablet by mouth once daily.       No current facility-administered medications on file prior to visit.       Social History:  Social History     Occupational History    Not on file   Tobacco Use    Smoking status: Former     Current packs/day: 0.00     Types: Cigarettes     Quit date:      Years since quittin.9     Passive exposure: Past    Smokeless tobacco: Never   Vaping Use    Vaping status: Never Used   Substance and Sexual Activity    Alcohol use: Not Currently    Drug use: Never    Sexual activity: Defer     Family History:  Family History   Problem Relation Name Age of Onset    No Known Problems Mother      No Known Problems Father       Exam:  General: Well-appearing female in no acute distress.  Awake, alert and oriented.  Pleasant and cooperative.  Respiratory: Non-labored breathing  Mood: Euthymic   Gait: Slow, antalgic  Assistive Device: 4 point cane    Right Hip  Range of motion:   Flexion: 90  Internal Rotation: neutral  External Rotation: 20  Hip Flexor Strength: 5/5  Abductor Strength: 5/5  Adductor Strength: 5/5  Sensation: Intact to light touch distally  Motor function: Able to fire TA, EHL, G/S  Pulses: Palpable DP pulse    Imaging interpretation:   AP pelvis, AP hip and false profile views: Independent review of left hip and pelvis x-rays was performed. The findings were reviewed with the patient. There are " severe degenerative changes of the right hip with associated joint space narrowing, subchondral sclerosis, and osteophyte formation.     Assessment and Plan:   89-year-old female with history of diabetes (A1c 7.3) and osteoporosis with right hip pain secondary to severe hip osteoarthritis.  She is also debilitated by low back pain.  We had a long discussion regarding treatment options.  She has exhausted most non-op care, and hip replacement is most likely the only solution provided durable and adequate relief.  Unfortunately, the patient has a few characteristics that warrant a judicious and cautious approach.  The most significant concern is bone mineralization. She had a recent CT for concern of acetabular fracture, which does not show a fracture, but her periarticular cortices are very thin and the bone is very demineralized.  I would worry about acetabular fracture.  Second, the most recent A1c is barely acceptable.  And third, her current age and vitality give me pause that she would not recover/rehabilitate well even after an uncomplicated surgery.  At the conclusion of the conversion, we were both on the fence.  I suggested using the next few months, while the recent injection makes surgery not an option, to complete her evaluation with pain management that she has scheduled in December.  I also encouraged good blood glucose control, continuing treatment of osteoporosis, and staying active as possible without sustaining an overuse or acute injury.  She will return to clinic in 3 months to check-in.     Eliot Abdul MD  Department of Orthopaedic Surgery  Division of Adult Reconstruction  , [facility]

## 2024-12-04 ENCOUNTER — APPOINTMENT (OUTPATIENT)
Dept: GASTROENTEROLOGY | Facility: CLINIC | Age: 89
End: 2024-12-04
Payer: MEDICARE

## 2024-12-04 VITALS
SYSTOLIC BLOOD PRESSURE: 126 MMHG | HEART RATE: 88 BPM | WEIGHT: 130 LBS | HEIGHT: 66 IN | BODY MASS INDEX: 20.89 KG/M2 | OXYGEN SATURATION: 97 % | DIASTOLIC BLOOD PRESSURE: 66 MMHG | RESPIRATION RATE: 18 BRPM

## 2024-12-04 DIAGNOSIS — Z90.49 S/P RIGHT HEMICOLECTOMY: ICD-10-CM

## 2024-12-04 DIAGNOSIS — C18.2 MALIGNANT NEOPLASM OF ASCENDING COLON (MULTI): ICD-10-CM

## 2024-12-04 DIAGNOSIS — R19.7 DIARRHEA, UNSPECIFIED TYPE: Primary | ICD-10-CM

## 2024-12-04 PROCEDURE — 1036F TOBACCO NON-USER: CPT | Performed by: STUDENT IN AN ORGANIZED HEALTH CARE EDUCATION/TRAINING PROGRAM

## 2024-12-04 PROCEDURE — 1123F ACP DISCUSS/DSCN MKR DOCD: CPT | Performed by: STUDENT IN AN ORGANIZED HEALTH CARE EDUCATION/TRAINING PROGRAM

## 2024-12-04 PROCEDURE — 1157F ADVNC CARE PLAN IN RCRD: CPT | Performed by: STUDENT IN AN ORGANIZED HEALTH CARE EDUCATION/TRAINING PROGRAM

## 2024-12-04 PROCEDURE — 1160F RVW MEDS BY RX/DR IN RCRD: CPT | Performed by: STUDENT IN AN ORGANIZED HEALTH CARE EDUCATION/TRAINING PROGRAM

## 2024-12-04 PROCEDURE — 1159F MED LIST DOCD IN RCRD: CPT | Performed by: STUDENT IN AN ORGANIZED HEALTH CARE EDUCATION/TRAINING PROGRAM

## 2024-12-04 PROCEDURE — 99215 OFFICE O/P EST HI 40 MIN: CPT | Performed by: STUDENT IN AN ORGANIZED HEALTH CARE EDUCATION/TRAINING PROGRAM

## 2024-12-04 NOTE — PROGRESS NOTES
"Subjective     History of Present Illness:   Lupis Jackson is a 89 y.o. female with hx of CAD on ASA, colorectal cancer s/p right hemicolectomy in 10/2023, T2DM on insulin, HLD who presents for follow up of post-surgical diarrhea.      She presents for follow up today and states she is feeling well. She has had resolution of her diarrhea. She states the questran has bene a miracle and has allowed her to leave her home without issues.     With regards to her hx, She was previously evaluated Dr. Geronimo in May 2023 for complaints of diarrhea as well. At that time colonoscopy was completed during which ascending colon mass was identified.     Patient states since her surgery in October her diarrhea worsened.  She states she has up to 7 or 8 bowel movements in the morning after eating breakfast and then she is \"free\" for the afternoon.  She has no blood in her stool.  She has no abdominal pain.  She has no weight loss.  She trialed Imodium 2-3 times a day without relief.    she has declined surveillance colonoscopies and imaging during most recent follow-up visit in July of this year.    Past Medical History   has a past medical history of Abnormal mammogram (10/09/2023), Breast cancer (Multi), Cataract, Colorectal cancer (Multi), Coronary artery disease, Dizziness, Essential (primary) hypertension (04/07/2022), HL (hearing loss), Hyperlipidemia, unspecified (07/26/2021), Hypothyroidism, Myocardial infarction (Multi), Presence of coronary angioplasty implant and graft (07/27/2020), Type 2 diabetes mellitus without complications (Multi) (12/15/2022), and Vitamin D deficiency, unspecified (04/22/2019).     Social History   reports that she quit smoking about 48 years ago. Her smoking use included cigarettes. She has been exposed to tobacco smoke. She has never used smokeless tobacco. She reports that she does not currently use alcohol. She reports that she does not use drugs.     Family History  family history includes No Known " "Problems in her father and mother.     Allergies  No Known Allergies    Medications  Current Outpatient Medications   Medication Instructions    alendronate (FOSAMAX) 70 mg, oral, Every 7 days, Take in the morning with a full glass of water, on an empty stomach, and do not take anything else by mouth or lie down for the next 30 min.    aspirin 81 mg EC tablet 1 tablet, Daily    atorvastatin (LIPITOR) 10 mg, oral, Every evening    blood sugar diagnostic (OneTouch Ultra Test) strip 2 times daily    cholecalciferol (Vitamin D-3) 50 MCG (2000 UT) tablet 1 tablet, Daily    cholestyramine (Questran) 4 gram packet 4 g, oral, 2 times daily, Take other medications 1 hour before or 4-6 hours after this medication.    diclofenac sodium (VOLTAREN) 2 g, Topical, 2 times daily    Lactobacillus acidophilus (Probiotic Acidophilus) 250 million cell capsule 1 capsule, oral, Daily    Lantus Solostar U-100 Insulin 6 Units, subcutaneous, Nightly    levothyroxine (Synthroid, Levoxyl) 112 mcg tablet TAKE ONE TABLET BY MOUTH DAILY    meclizine (ANTIVERT) 12.5 mg, oral, Every 12 hours PRN    mv-calcium-min-iron fm-FA-vitK (One-A-Day Women's Complete,vK,) 18 mg-400 mcg- 25 mcg tablet 1 tablet, Daily    nitroglycerin (NITROSTAT) 0.4 mg, Every 5 min PRN    nut.tx.gluc intol,lf,soy-fiber (Glucerna 1.5 Maynor) 0.08-1.5 gram-kcal/mL liquid 237 mL, oral, 2 times daily (morning and midday)    pantoprazole (PROTONIX) 40 mg, oral, Daily, Do not crush, chew, or split    PARoxetine (PAXIL) 20 mg, oral, Every morning    pen needle, diabetic (Sure Comfort Pen Needle) 32 gauge x 5/32\" needle USE ONE EVERY DAY    vitamins A,C,E-zinc-copper (PreserVision AREDS) 2,148 mcg-113 mg-45 mg-17.4mg tablet 1 tablet, Daily        Objective   Visit Vitals  /66 (BP Location: Left arm, Patient Position: Sitting, BP Cuff Size: Adult)   Pulse 88   Resp 18      Physical Exam  Constitutional:       General: She is not in acute distress.     Appearance: Normal appearance. "   HENT:      Head: Normocephalic and atraumatic.      Mouth/Throat:      Mouth: Mucous membranes are moist.   Eyes:      Extraocular Movements: Extraocular movements intact.   Pulmonary:      Effort: Pulmonary effort is normal.      Breath sounds: No stridor. No wheezing.   Abdominal:      General: Abdomen is flat. There is no distension.   Musculoskeletal:         General: Normal range of motion.   Skin:     General: Skin is warm and dry.   Neurological:      General: No focal deficit present.      Mental Status: She is alert.   Psychiatric:         Mood and Affect: Mood normal.         Judgment: Judgment normal.         Assessment/Plan   Lupis Jackson is a 89 y.o. female with hx of CAD on ASA, colorectal cancer s/p right hemicolectomy in 10/2023, T2DM on insulin, HLD who presents for eval of chronic diarrhea since her colon surgery in 10/2023. Since resolved with questran. She feels well. Notes that her stool is ?Dark though only has one bowel movement a day that is formed/pebble like. Discussed with patient this is not worrisome for melena. Will plan to decrease questran to once daily since she has been taking laxative to prevent pebble stool.     Pt favors conservative measures with her age she is not interested in proceeding with surveillance colonoscopy at this time.      Problem List Items Addressed This Visit       Malignant neoplasm of ascending colon (Multi)    Diarrhea - Primary     Other Visit Diagnoses       S/P right hemicolectomy                   Desiree Prakash MD         My final recommendations will be communicated back to the requesting physician by way of shared Medical record or letter to requesting physician via fax.

## 2024-12-09 DIAGNOSIS — L29.9 PRURITUS: ICD-10-CM

## 2024-12-11 RX ORDER — CHOLESTYRAMINE 4 G/9G
POWDER, FOR SUSPENSION ORAL
Qty: 60 PACKET | Refills: 0 | Status: SHIPPED | OUTPATIENT
Start: 2024-12-11

## 2024-12-20 ENCOUNTER — APPOINTMENT (OUTPATIENT)
Dept: SURGERY | Facility: CLINIC | Age: 89
End: 2024-12-20
Payer: MEDICARE

## 2024-12-30 ENCOUNTER — LAB (OUTPATIENT)
Dept: LAB | Facility: LAB | Age: 89
End: 2024-12-30
Payer: MEDICARE

## 2024-12-30 DIAGNOSIS — E78.1 ESSENTIAL HYPERTRIGLYCERIDEMIA: ICD-10-CM

## 2024-12-30 DIAGNOSIS — E78.2 MIXED HYPERLIPIDEMIA: ICD-10-CM

## 2024-12-30 LAB
ALBUMIN SERPL BCP-MCNC: 4.2 G/DL (ref 3.4–5)
ALP SERPL-CCNC: 83 U/L (ref 33–136)
ALT SERPL W P-5'-P-CCNC: 11 U/L (ref 7–45)
ANION GAP SERPL CALC-SCNC: 13 MMOL/L (ref 10–20)
AST SERPL W P-5'-P-CCNC: 15 U/L (ref 9–39)
BASOPHILS # BLD AUTO: 0.04 X10*3/UL (ref 0–0.1)
BASOPHILS NFR BLD AUTO: 0.5 %
BILIRUB SERPL-MCNC: 0.3 MG/DL (ref 0–1.2)
BUN SERPL-MCNC: 25 MG/DL (ref 6–23)
CALCIUM SERPL-MCNC: 9.6 MG/DL (ref 8.6–10.3)
CHLORIDE SERPL-SCNC: 102 MMOL/L (ref 98–107)
CO2 SERPL-SCNC: 28 MMOL/L (ref 21–32)
CREAT SERPL-MCNC: 0.91 MG/DL (ref 0.5–1.05)
EGFRCR SERPLBLD CKD-EPI 2021: 57 ML/MIN/1.73M*2
EOSINOPHIL # BLD AUTO: 0.15 X10*3/UL (ref 0–0.4)
EOSINOPHIL NFR BLD AUTO: 1.9 %
ERYTHROCYTE [DISTWIDTH] IN BLOOD BY AUTOMATED COUNT: 12.5 % (ref 11.5–14.5)
GLUCOSE SERPL-MCNC: 149 MG/DL (ref 74–99)
HCT VFR BLD AUTO: 37.9 % (ref 36–46)
HGB BLD-MCNC: 11.7 G/DL (ref 12–16)
IMM GRANULOCYTES # BLD AUTO: 0.02 X10*3/UL (ref 0–0.5)
IMM GRANULOCYTES NFR BLD AUTO: 0.2 % (ref 0–0.9)
LYMPHOCYTES # BLD AUTO: 2.26 X10*3/UL (ref 0.8–3)
LYMPHOCYTES NFR BLD AUTO: 28 %
MCH RBC QN AUTO: 31.3 PG (ref 26–34)
MCHC RBC AUTO-ENTMCNC: 30.9 G/DL (ref 32–36)
MCV RBC AUTO: 101 FL (ref 80–100)
MONOCYTES # BLD AUTO: 0.59 X10*3/UL (ref 0.05–0.8)
MONOCYTES NFR BLD AUTO: 7.3 %
NEUTROPHILS # BLD AUTO: 5 X10*3/UL (ref 1.6–5.5)
NEUTROPHILS NFR BLD AUTO: 62.1 %
NRBC BLD-RTO: 0 /100 WBCS (ref 0–0)
PLATELET # BLD AUTO: 391 X10*3/UL (ref 150–450)
POTASSIUM SERPL-SCNC: 4.6 MMOL/L (ref 3.5–5.3)
PROT SERPL-MCNC: 6.7 G/DL (ref 6.4–8.2)
RBC # BLD AUTO: 3.74 X10*6/UL (ref 4–5.2)
SODIUM SERPL-SCNC: 138 MMOL/L (ref 136–145)
WBC # BLD AUTO: 8.1 X10*3/UL (ref 4.4–11.3)

## 2024-12-30 PROCEDURE — 80053 COMPREHEN METABOLIC PANEL: CPT

## 2024-12-30 PROCEDURE — 85025 COMPLETE CBC W/AUTO DIFF WBC: CPT

## 2024-12-31 ENCOUNTER — TELEPHONE (OUTPATIENT)
Dept: PRIMARY CARE | Facility: CLINIC | Age: 89
End: 2024-12-31
Payer: MEDICARE

## 2024-12-31 NOTE — TELEPHONE ENCOUNTER
----- Message from Colton Menjivar sent at 12/30/2024  5:42 PM EST -----  Labs acceptable but there are a few abnormalities similar to previous labs.  Please advise patient to continue the same medications and follow-up to review in detail and discuss the management options as already scheduled appointment.

## 2025-01-07 ENCOUNTER — DOCUMENTATION (OUTPATIENT)
Dept: HOME HEALTH SERVICES | Facility: HOME HEALTH | Age: OVER 89
End: 2025-01-07

## 2025-01-07 ENCOUNTER — HOME HEALTH ADMISSION (OUTPATIENT)
Dept: HOME HEALTH SERVICES | Facility: HOME HEALTH | Age: OVER 89
End: 2025-01-07
Payer: MEDICARE

## 2025-01-07 ENCOUNTER — APPOINTMENT (OUTPATIENT)
Dept: PRIMARY CARE | Facility: CLINIC | Age: OVER 89
End: 2025-01-07
Payer: MEDICARE

## 2025-01-07 VITALS
BODY MASS INDEX: 20.89 KG/M2 | HEART RATE: 90 BPM | DIASTOLIC BLOOD PRESSURE: 78 MMHG | SYSTOLIC BLOOD PRESSURE: 137 MMHG | HEIGHT: 66 IN | WEIGHT: 130 LBS

## 2025-01-07 DIAGNOSIS — C18.2 MALIGNANT NEOPLASM OF ASCENDING COLON (MULTI): ICD-10-CM

## 2025-01-07 DIAGNOSIS — M81.0 AGE-RELATED OSTEOPOROSIS WITHOUT CURRENT PATHOLOGICAL FRACTURE: ICD-10-CM

## 2025-01-07 DIAGNOSIS — E13.69 OTHER SPECIFIED DIABETES MELLITUS WITH OTHER SPECIFIED COMPLICATION, WITH LONG-TERM CURRENT USE OF INSULIN: ICD-10-CM

## 2025-01-07 DIAGNOSIS — Z95.5 HISTORY OF CORONARY ARTERY STENT PLACEMENT: ICD-10-CM

## 2025-01-07 DIAGNOSIS — R73.9 ELEVATED BLOOD SUGAR: ICD-10-CM

## 2025-01-07 DIAGNOSIS — E55.9 VITAMIN D DEFICIENCY: ICD-10-CM

## 2025-01-07 DIAGNOSIS — M25.551 PAIN OF RIGHT HIP: Primary | ICD-10-CM

## 2025-01-07 DIAGNOSIS — E78.2 MIXED HYPERLIPIDEMIA: ICD-10-CM

## 2025-01-07 DIAGNOSIS — Z79.4 OTHER SPECIFIED DIABETES MELLITUS WITH OTHER SPECIFIED COMPLICATION, WITH LONG-TERM CURRENT USE OF INSULIN: ICD-10-CM

## 2025-01-07 DIAGNOSIS — I25.10 CORONARY ARTERY DISEASE INVOLVING NATIVE CORONARY ARTERY OF NATIVE HEART WITHOUT ANGINA PECTORIS: ICD-10-CM

## 2025-01-07 PROCEDURE — G2211 COMPLEX E/M VISIT ADD ON: HCPCS | Performed by: INTERNAL MEDICINE

## 2025-01-07 PROCEDURE — 1036F TOBACCO NON-USER: CPT | Performed by: INTERNAL MEDICINE

## 2025-01-07 PROCEDURE — 99214 OFFICE O/P EST MOD 30 MIN: CPT | Performed by: INTERNAL MEDICINE

## 2025-01-07 PROCEDURE — 1123F ACP DISCUSS/DSCN MKR DOCD: CPT | Performed by: INTERNAL MEDICINE

## 2025-01-07 PROCEDURE — 1157F ADVNC CARE PLAN IN RCRD: CPT | Performed by: INTERNAL MEDICINE

## 2025-01-07 PROCEDURE — 1159F MED LIST DOCD IN RCRD: CPT | Performed by: INTERNAL MEDICINE

## 2025-01-07 PROCEDURE — 1160F RVW MEDS BY RX/DR IN RCRD: CPT | Performed by: INTERNAL MEDICINE

## 2025-01-07 RX ORDER — GABAPENTIN 100 MG/1
100 CAPSULE ORAL DAILY
Qty: 30 CAPSULE | Refills: 1 | Status: SHIPPED | OUTPATIENT
Start: 2025-01-07 | End: 2025-03-08

## 2025-01-07 ASSESSMENT — PATIENT HEALTH QUESTIONNAIRE - PHQ9
2. FEELING DOWN, DEPRESSED OR HOPELESS: NOT AT ALL
SUM OF ALL RESPONSES TO PHQ9 QUESTIONS 1 AND 2: 0
1. LITTLE INTEREST OR PLEASURE IN DOING THINGS: NOT AT ALL

## 2025-01-07 ASSESSMENT — LIFESTYLE VARIABLES
HAVE YOU OR SOMEONE ELSE BEEN INJURED AS A RESULT OF YOUR DRINKING: NO
AUDIT TOTAL SCORE: 0
HOW OFTEN DO YOU HAVE A DRINK CONTAINING ALCOHOL: NEVER
SKIP TO QUESTIONS 9-10: 1
AUDIT-C TOTAL SCORE: 0
HOW OFTEN DO YOU HAVE SIX OR MORE DRINKS ON ONE OCCASION: NEVER
HOW MANY STANDARD DRINKS CONTAINING ALCOHOL DO YOU HAVE ON A TYPICAL DAY: PATIENT DOES NOT DRINK
HAS A RELATIVE, FRIEND, DOCTOR, OR ANOTHER HEALTH PROFESSIONAL EXPRESSED CONCERN ABOUT YOUR DRINKING OR SUGGESTED YOU CUT DOWN: NO

## 2025-01-07 NOTE — HH CARE COORDINATION
Home Care received a referral for Medical Social Work. Unfortunately, we are unable to accept and process the referral at this time.    Patients, please reach out to the referring provider or your PCP to assist in obtaining an alternative home care agency and/or guidance to meet your needs.    Providers, please reach out to  Home Care with any questions regarding the declined referral.

## 2025-01-07 NOTE — PROGRESS NOTES
Assessment/Plan   89 years old female who has had history of colon cancer and had surgery living independently came for a follow-up visit.  She has chronic pain due to her right hip pain in the right leg pain.  After detailed discussion it was decided that she will try the gabapentin very low-dose.  Discussed the side effects risk and the benefits.  Specially I discussed with the patient about the symptoms of dizziness sometimes occurs with the medication.  Fall prevention was discussed in detail.  Her coronary artery disease is stable and her blood sugars are stable.  We discussed about the medications including the statins which patient is tolerating well.  Discussed about patient's osteoporosis and the fall prevention.  She will continue the alendronate.    Patient will continue the rest of the medications and will have the blood test as ordered and will follow-up with me in 1 month to reevaluate about her response to the gabapentin.  Referral to the home health was written for mainly the social service follow-up to have the patient evaluated for nursing home placement.  We also discussed about the other options such as assisted living.      Problem List Items Addressed This Visit       Malignant neoplasm of ascending colon (Multi)    Relevant Orders    Referral to Home Care    CBC and Auto Differential    CAD (coronary artery disease)    Other specified diabetes mellitus with other specified complication, with long-term current use of insulin    Relevant Orders    Hemoglobin A1C    TSH with reflex to Free T4 if abnormal    History of coronary artery stent placement    Hyperlipidemia    Relevant Orders    Comprehensive Metabolic Panel    Lipid Panel    TSH with reflex to Free T4 if abnormal    Vitamin D deficiency    Relevant Orders    Vitamin D 25-Hydroxy,Total (for eval of Vitamin D levels)    TSH with reflex to Free T4 if abnormal    Age-related osteoporosis without current pathological fracture    Relevant  Orders    Referral to Home Care    TSH with reflex to Free T4 if abnormal    Pain of right hip - Primary    Relevant Medications    gabapentin (Neurontin) 100 mg capsule     Other Visit Diagnoses       Elevated blood sugar        Relevant Orders    Hemoglobin A1C    TSH with reflex to Free T4 if abnormal            Subjective     Patient ID: Lupis Jackson is a 89 y.o. female who presents for Results.    History of present illness  89 years old female who is living independently has had colon cancer came for a follow-up visit.  She is accompanied by the nephew who cares her since he is in town but her POA is niece.    Patient continued to have pain in her right hip and also some radiating pain to the legs.  She says it is bothering her and she has an appointment with the pain management.  She wants to have some medication.  She is trying to adjust her life.  But she says because of this pain her activities are limited.  She only goes to shopping with the help very rarely.    Because of her limitation of movements she would like to go to a nursing home.    She is taking her medications regularly.  She has no side effects.  Her blood sugars are well-controlled and there was no episodes of hypoglycemia.    She denies any urine changes and her bowels are regulated with the current medications.    Family History   Problem Relation Name Age of Onset    No Known Problems Mother      No Known Problems Father        Social History     Socioeconomic History    Marital status:      Spouse name: Not on file    Number of children: Not on file    Years of education: Not on file    Highest education level: Not on file   Occupational History    Not on file   Tobacco Use    Smoking status: Former     Current packs/day: 0.00     Types: Cigarettes     Quit date:      Years since quittin.0     Passive exposure: Past    Smokeless tobacco: Never   Vaping Use    Vaping status: Never Used   Substance and Sexual Activity    Alcohol  use: Never    Drug use: Never    Sexual activity: Defer   Other Topics Concern    Not on file   Social History Narrative    Not on file     Social Drivers of Health     Financial Resource Strain: Low Risk  (10/16/2023)    Overall Financial Resource Strain (CARDIA)     Difficulty of Paying Living Expenses: Not hard at all   Food Insecurity: Not on file   Transportation Needs: No Transportation Needs (11/13/2023)    OASIS : Transportation     Lack of Transportation (Medical): No     Lack of Transportation (Non-Medical): No     Patient Unable or Declines to Respond: No   Physical Activity: Not on file   Stress: Not on file   Social Connections: Feeling Socially Integrated (11/13/2023)    OASIS : Social Isolation     Frequency of experiencing loneliness or isolation: Rarely   Intimate Partner Violence: Not on file   Housing Stability: Low Risk  (10/16/2023)    Housing Stability Vital Sign     Unable to Pay for Housing in the Last Year: No     Number of Places Lived in the Last Year: 1     Unstable Housing in the Last Year: No      Patient has no known allergies.   Current Outpatient Medications   Medication Sig Dispense Refill    alendronate (Fosamax) 70 mg tablet Take 1 tablet (70 mg) by mouth every 7 days. Take in the morning with a full glass of water, on an empty stomach, and do not take anything else by mouth or lie down for the next 30 min. 4 tablet 11    aspirin 81 mg EC tablet Take 1 tablet (81 mg) by mouth once daily.      atorvastatin (Lipitor) 10 mg tablet TAKE ONE TABLET BY MOUTH DAILY IN THE EVENING 90 tablet 3    blood sugar diagnostic (OneTouch Ultra Test) strip TEST TWICE DAILY 200 each 1    cholecalciferol (Vitamin D-3) 50 MCG (2000 UT) tablet Take 1 tablet (2,000 Units) by mouth once daily.      cholestyramine (Questran) 4 gram packet DISSOLVE & TAKE ONE PACKET (4GRAMS) BY MOUTH TWO TIMES A DAY. TAKE OTHER MEDICATIONS 1 HOUR BEFORE OR 4-6 HOURS AFTER THIS MEDICATION 60 packet 0    diclofenac  "sodium (Voltaren) 1 % gel Apply 2.25 inches (2 g) topically 2 times a day. 50 g 1    insulin glargine (Lantus Solostar U-100 Insulin) 100 unit/mL (3 mL) pen Inject 6 Units under the skin once daily at bedtime. 15 mL 0    Lactobacillus acidophilus (Probiotic Acidophilus) 250 million cell capsule Take 1 capsule by mouth once daily. 30 capsule 1    levothyroxine (Synthroid, Levoxyl) 112 mcg tablet TAKE ONE TABLET BY MOUTH DAILY 90 tablet 3    meclizine (Antivert) 12.5 mg tablet TAKE ONE TABLET BY MOUTH EVERY 12 HOURS AS NEEDED 40 tablet 0    mv-calcium-min-iron fm-FA-vitK (One-A-Day Women's Complete,vK,) 18 mg-400 mcg- 25 mcg tablet Take 1 tablet by mouth once daily.      nitroglycerin (Nitrostat) 0.4 mg SL tablet Place 1 tablet (0.4 mg) under the tongue every 5 minutes if needed for chest pain.      nut.tx.gluc intol,lf,soy-fiber (Glucerna 1.5 Maynor) 0.08-1.5 gram-kcal/mL liquid Take 237 mL by mouth 2 times daily after breakfast and lunch. 4740 mL 3    pantoprazole (ProtoNix) 40 mg EC tablet TAKE ONE TABLET BY MOUTH DAILY. DO NOT CRUSH, CHEW OR SPLIT 90 tablet 1    PARoxetine (Paxil) 20 mg tablet Take 1 tablet (20 mg) by mouth once daily in the morning. 90 tablet 3    pen needle, diabetic (Sure Comfort Pen Needle) 32 gauge x 5/32\" needle USE ONE EVERY  each 2    vitamins A,C,E-zinc-copper (PreserVision AREDS) 2,148 mcg-113 mg-45 mg-17.4mg tablet Take 1 tablet by mouth once daily.      gabapentin (Neurontin) 100 mg capsule Take 1 capsule (100 mg) by mouth once daily. 30 capsule 1     No current facility-administered medications for this visit.        Objective     Vitals:    01/07/25 1237   BP: 137/78   Pulse: 90        Physical Exam  Thin built pleasant elderly female,  with no apparent distress. Alert oriented  Has difficulty in hearing but able to communicate.  Skin:  Normal turgor.  No rash.  Head:  Normocephalic, atraumatic.  Eyes:  Pupils are equal, round,.  No pallor of conjunctivae.  Mouth has moist oral " mucosa.   Neck:  Supple.  No JVD.  No carotid bruit.  No thyromegaly. No cervical lymphadenopathy.  No clubbing, significant peripheral osteoarthritis noted  Has kyphoscoliosis of the thoracic spine noted  Chest:  Vesicular breathing Bilaterally moderate air entry and bilaterally clear to auscultation.  No wheezing.  No crackles.  Heart:  Regular rate with intermittent ectopics and rhythm.  S1, S2 positive.  No murmur.  Abdomen:  Soft, no discomfort in the left lower quadrant.  Bowel sounds are positive.  No organomegaly.  Extremities:  Bilaterally no pedal pitting edema.  Able to walk with a cane.  Bilaterally 2+ dorsalis pedis pulses.  No calf tenderness. Homans sign is negative.  Neuro Exam: No focal signs.         Problem List Items Addressed This Visit       Malignant neoplasm of ascending colon (Multi)    Relevant Orders    Referral to Home Care    CBC and Auto Differential    CAD (coronary artery disease)    Other specified diabetes mellitus with other specified complication, with long-term current use of insulin    Relevant Orders    Hemoglobin A1C    TSH with reflex to Free T4 if abnormal    History of coronary artery stent placement    Hyperlipidemia    Relevant Orders    Comprehensive Metabolic Panel    Lipid Panel    TSH with reflex to Free T4 if abnormal    Vitamin D deficiency    Relevant Orders    Vitamin D 25-Hydroxy,Total (for eval of Vitamin D levels)    TSH with reflex to Free T4 if abnormal    Age-related osteoporosis without current pathological fracture    Relevant Orders    Referral to Home Care    TSH with reflex to Free T4 if abnormal    Pain of right hip - Primary    Relevant Medications    gabapentin (Neurontin) 100 mg capsule     Other Visit Diagnoses       Elevated blood sugar        Relevant Orders    Hemoglobin A1C    TSH with reflex to Free T4 if abnormal             Orders Placed This Encounter   Procedures    CBC and Auto Differential     Standing Status:   Future     Standing  Expiration Date:   4/7/2025     Order Specific Question:   Release result to MyChart     Answer:   Immediate    Comprehensive Metabolic Panel     Standing Status:   Future     Standing Expiration Date:   5/7/2025     Order Specific Question:   Release result to MyChart     Answer:   Immediate    Hemoglobin A1C     Standing Status:   Future     Standing Expiration Date:   4/7/2025     Order Specific Question:   Release result to MyChart     Answer:   Immediate    Vitamin D 25-Hydroxy,Total (for eval of Vitamin D levels)     Standing Status:   Future     Standing Expiration Date:   5/7/2025     Order Specific Question:   Release result to MyChart     Answer:   Immediate    Lipid Panel     fasting     Standing Status:   Future     Standing Expiration Date:   4/7/2025     Order Specific Question:   Release result to MyChart     Answer:   Immediate [1]    TSH with reflex to Free T4 if abnormal     Standing Status:   Future     Standing Expiration Date:   1/7/2026     Order Specific Question:   Release result to MyChart     Answer:   Immediate    Referral to Home Care     Special Instructions:  For Nursing home placement    I attest that I or another qualified licensed provider saw Lupis Jackson 90 days prior to or 30 days post admission and this face to face encounter meets the necessary Home Health requirements. The face to face encounter occurred on (date) 1/7/2025.    The encounter with the patient was in whole, or in part, for the following medical condition, which is the primary reason for home health care. (List medical condition) leg pain, colon cancer    I certify that, based on my findings, the following services are medically necessary skilled home health services: Evaluate and Other: fall prevention.    Further, I certify that my clinical findings support this patient's homebound status (i.e. absences from home require considerable and taxing effort, are for health treatment, or for attendance at [employer] events;  absences from home for nonmedical reasons are infrequent or are of relatively short duration).    The clinical findings that support the need for home care and homebound status are due to needs the assistance of another person to leave place of residence and the patient has a condition such that leaving his/her home is medically contraindicated.  There exists a normal inability to leave home and leaving home requires a considerable and taxing effort including patient diagnoses colon cancer     Standing Status:   Future     Standing Expiration Date:   1/7/2026     Referral Priority:   Routine     Referral Type:   Home Health     Referral Reason:   Specialty Services Required     Requested Specialty:   Home Health Services     Number of Visits Requested:   999        Lab Results   Component Value Date    WBC 8.1 12/30/2024    HGB 11.7 (L) 12/30/2024    HCT 37.9 12/30/2024     12/30/2024    CHOL 136 02/02/2023    TRIG 163 (H) 04/07/2021    HDL 47.8 02/02/2023    ALT 11 12/30/2024    AST 15 12/30/2024     12/30/2024    K 4.6 12/30/2024     12/30/2024    CREATININE 0.91 12/30/2024    BUN 25 (H) 12/30/2024    CO2 28 12/30/2024    TSH 3.26 06/04/2024    INR 0.9 10/16/2023    HGBA1C 7.3 (H) 09/30/2024     Lab Results   Component Value Date    CHOL 136 02/02/2023    CHHDL 2.8 02/02/2023       No results found.

## 2025-01-07 NOTE — HH CARE COORDINATION
Home Care received a Referral for Physical Therapy and Medical Social Work. We have processed the referral for a Start of Care on 01/08/2025.     If you have any questions or concerns, please feel free to contact us at [phone]. Follow the prompts, enter your five digit zip code, and you will be directed to your care team on [facility].

## 2025-01-09 ENCOUNTER — HOME CARE VISIT (OUTPATIENT)
Dept: HOME HEALTH SERVICES | Facility: HOME HEALTH | Age: OVER 89
End: 2025-01-09
Payer: MEDICARE

## 2025-01-09 VITALS
SYSTOLIC BLOOD PRESSURE: 110 MMHG | HEART RATE: 84 BPM | OXYGEN SATURATION: 97 % | DIASTOLIC BLOOD PRESSURE: 60 MMHG | TEMPERATURE: 98.4 F

## 2025-01-09 PROCEDURE — 169592 NO-PAY CLAIM PROCEDURE

## 2025-01-09 PROCEDURE — G0151 HHCP-SERV OF PT,EA 15 MIN: HCPCS | Mod: HHH

## 2025-01-09 PROCEDURE — G0155 HHCP-SVS OF CSW,EA 15 MIN: HCPCS | Mod: HHH

## 2025-01-09 SDOH — SOCIAL STABILITY: SOCIAL NETWORK: HELP FROM FAMILY/FRIENDS: NEPHEW

## 2025-01-09 ASSESSMENT — ENCOUNTER SYMPTOMS
PAIN LOCATION: RIGHT LEG
PAIN LOCATION - RELIEVING FACTORS: LAYING DOWN
PERSON REPORTING PAIN: PATIENT
PAIN: 1
HIGHEST PAIN SEVERITY IN PAST 24 HOURS: 9/10
PAIN LOCATION - EXACERBATING FACTORS: STANDING/WALKING
MUSCLE WEAKNESS: 1
PAIN LOCATION - PAIN SEVERITY: 7/10
SHORTNESS OF BREATH: T

## 2025-01-09 ASSESSMENT — ACTIVITIES OF DAILY LIVING (ADL)
AMBULATION ASSISTANCE ON FLAT SURFACES: 1
CURRENT_FUNCTION: STAND BY ASSIST
CURRENT_FUNCTION: ONE PERSON
ENTERING_EXITING_HOME: ONE PERSON
AMBULATION ASSISTANCE: STAND BY ASSIST
AMBULATION ASSISTANCE: ONE PERSON
AMBULATION_DISTANCE/DURATION_TOLERATED: 100 FT
OASIS_M1830: 02
BATHING_REQUIRES_ASSISTANCE: 1
SHOPPING_REQUIRES_ASSISTANCE: 1

## 2025-01-13 DIAGNOSIS — L29.9 PRURITUS: ICD-10-CM

## 2025-01-15 ENCOUNTER — HOME CARE VISIT (OUTPATIENT)
Dept: HOME HEALTH SERVICES | Facility: HOME HEALTH | Age: OVER 89
End: 2025-01-15
Payer: MEDICARE

## 2025-01-15 VITALS
TEMPERATURE: 97.4 F | HEART RATE: 73 BPM | DIASTOLIC BLOOD PRESSURE: 68 MMHG | SYSTOLIC BLOOD PRESSURE: 114 MMHG | OXYGEN SATURATION: 99 %

## 2025-01-15 PROCEDURE — G0151 HHCP-SERV OF PT,EA 15 MIN: HCPCS | Mod: HHH

## 2025-01-15 RX ORDER — CHOLESTYRAMINE 4 G/9G
1 POWDER, FOR SUSPENSION ORAL DAILY
Qty: 60 PACKET | Refills: 2 | Status: SHIPPED | OUTPATIENT
Start: 2025-01-15 | End: 2025-07-14

## 2025-01-15 RX ORDER — CHOLESTYRAMINE 4 G/9G
POWDER, FOR SUSPENSION ORAL
Qty: 60 PACKET | Refills: 0 | Status: SHIPPED | OUTPATIENT
Start: 2025-01-15 | End: 2025-01-15

## 2025-01-15 ASSESSMENT — ENCOUNTER SYMPTOMS
PAIN: 1
PAIN LOCATION: RIGHT LEG
PERSON REPORTING PAIN: PATIENT
PAIN LOCATION - PAIN SEVERITY: 8/10

## 2025-01-22 ENCOUNTER — HOME CARE VISIT (OUTPATIENT)
Dept: HOME HEALTH SERVICES | Facility: HOME HEALTH | Age: OVER 89
End: 2025-01-22
Payer: MEDICARE

## 2025-01-22 VITALS
HEART RATE: 80 BPM | TEMPERATURE: 97.9 F | SYSTOLIC BLOOD PRESSURE: 115 MMHG | DIASTOLIC BLOOD PRESSURE: 60 MMHG | OXYGEN SATURATION: 95 %

## 2025-01-22 PROCEDURE — G0151 HHCP-SERV OF PT,EA 15 MIN: HCPCS | Mod: HHH

## 2025-01-22 ASSESSMENT — ACTIVITIES OF DAILY LIVING (ADL)
AMBULATION ASSISTANCE: STAND BY ASSIST
AMBULATION_DISTANCE/DURATION_TOLERATED: 120 FT
AMBULATION ASSISTANCE ON FLAT SURFACES: 1
AMBULATION ASSISTANCE: ONE PERSON

## 2025-01-22 ASSESSMENT — ENCOUNTER SYMPTOMS
PAIN LOCATION - EXACERBATING FACTORS: STANDING/WALKING
PAIN LOCATION - PAIN QUALITY: SHARP
PAIN LOCATION - RELIEVING FACTORS: SITTING, REST
PAIN LOCATION - PAIN SEVERITY: 8/10
ARTHRALGIAS: 1
PAIN: 1
SUBJECTIVE PAIN PROGRESSION: UNCHANGED
MUSCLE WEAKNESS: 1
PAIN LOCATION: RIGHT HIP
PERSON REPORTING PAIN: PATIENT

## 2025-01-23 ENCOUNTER — APPOINTMENT (OUTPATIENT)
Dept: PODIATRY | Facility: CLINIC | Age: OVER 89
End: 2025-01-23
Payer: MEDICARE

## 2025-01-23 DIAGNOSIS — M79.675 PAIN IN TOES OF BOTH FEET: Primary | ICD-10-CM

## 2025-01-23 DIAGNOSIS — B35.1 FUNGAL NAIL INFECTION: ICD-10-CM

## 2025-01-23 DIAGNOSIS — M79.674 PAIN IN TOES OF BOTH FEET: Primary | ICD-10-CM

## 2025-01-23 PROCEDURE — 11721 DEBRIDE NAIL 6 OR MORE: CPT | Performed by: PODIATRIST

## 2025-01-23 NOTE — PROGRESS NOTES
History Of Present Illness  Lupis Jackson is a 89 y.o. female presenting for diabetic nail care. Patient complains with painful elongated nails.    PCP Colton Menjivar MD  last visit 10/1/24     Past Medical History  She has a past medical history of Abnormal mammogram (10/09/2023), Breast cancer (Multi), Cataract, Colorectal cancer (Multi), Coronary artery disease, Dizziness, Essential (primary) hypertension (04/07/2022), HL (hearing loss), Hyperlipidemia, unspecified (07/26/2021), Hypothyroidism, Myocardial infarction (Multi), Presence of coronary angioplasty implant and graft (07/27/2020), Type 2 diabetes mellitus without complications (Multi) (12/15/2022), and Vitamin D deficiency, unspecified (04/22/2019).    Surgical History  She has a past surgical history that includes Other surgical history (12/10/2018); Colonoscopy; Cataract extraction; and Colon surgery.     Social History  She reports that she quit smoking about 49 years ago. Her smoking use included cigarettes. She has been exposed to tobacco smoke. She has never used smokeless tobacco. She reports that she does not drink alcohol and does not use drugs.    Family History  Family History   Problem Relation Name Age of Onset    No Known Problems Mother      No Known Problems Father          Allergies  Patient has no known allergies.    Medications  Current Outpatient Medications   Medication Sig Dispense Refill    alendronate (Fosamax) 70 mg tablet Take 1 tablet (70 mg) by mouth every 7 days. Take in the morning with a full glass of water, on an empty stomach, and do not take anything else by mouth or lie down for the next 30 min. 4 tablet 11    aspirin 81 mg EC tablet Take 1 tablet (81 mg) by mouth once daily.      atorvastatin (Lipitor) 10 mg tablet TAKE ONE TABLET BY MOUTH DAILY IN THE EVENING 90 tablet 3    blood sugar diagnostic (OneTouch Ultra Test) strip TEST TWICE DAILY 200 each 1    cholecalciferol (Vitamin D-3) 50 MCG (2000 UT) tablet Take 1 tablet (2,000  "Units) by mouth once daily.      cholestyramine (Questran) 4 gram packet Take 1 packet (4 g) by mouth once daily. 60 packet 2    diclofenac sodium (Voltaren) 1 % gel Apply 2.25 inches (2 g) topically 2 times a day. 50 g 1    gabapentin (Neurontin) 100 mg capsule Take 1 capsule (100 mg) by mouth once daily. 30 capsule 1    insulin glargine (Lantus Solostar U-100 Insulin) 100 unit/mL (3 mL) pen Inject 6 Units under the skin once daily at bedtime. 15 mL 0    Lactobacillus acidophilus (Probiotic Acidophilus) 250 million cell capsule Take 1 capsule by mouth once daily. 30 capsule 1    levothyroxine (Synthroid, Levoxyl) 112 mcg tablet TAKE ONE TABLET BY MOUTH DAILY 90 tablet 3    meclizine (Antivert) 12.5 mg tablet TAKE ONE TABLET BY MOUTH EVERY 12 HOURS AS NEEDED 40 tablet 0    mv-calcium-min-iron fm-FA-vitK (One-A-Day Women's Complete,vK,) 18 mg-400 mcg- 25 mcg tablet Take 1 tablet by mouth once daily.      nitroglycerin (Nitrostat) 0.4 mg SL tablet Place 1 tablet (0.4 mg) under the tongue every 5 minutes if needed for chest pain.      nut.tx.gluc intol,lf,soy-fiber (Glucerna 1.5 Maynor) 0.08-1.5 gram-kcal/mL liquid Take 237 mL by mouth 2 times daily after breakfast and lunch. 4740 mL 3    pantoprazole (ProtoNix) 40 mg EC tablet TAKE ONE TABLET BY MOUTH DAILY. DO NOT CRUSH, CHEW OR SPLIT 90 tablet 1    PARoxetine (Paxil) 20 mg tablet Take 1 tablet (20 mg) by mouth once daily in the morning. 90 tablet 3    pen needle, diabetic (Sure Comfort Pen Needle) 32 gauge x 5/32\" needle USE ONE EVERY  each 2    vitamins A,C,E-zinc-copper (PreserVision AREDS) 2,148 mcg-113 mg-45 mg-17.4mg tablet Take 1 tablet by mouth once daily.       No current facility-administered medications for this visit.       Review of Systems    REVIEW OF SYSTEMS  GENERAL:  Negative for malaise, significant weight loss, fever  CARDIOVASCULAR: leg swelling   MUSCULOSKELETAL:  Negative for joint pain or swelling, back pain, and muscle pain.  SKIN:  " Negative for lesions, rash, and itching  PSYCH:  Negative for sleep disturbance, mood disorder and recent psychosocial stressors  NEURO: Negative, denies any burning, tingling or numbness     Objective:  frail   Vasc: DP and PT pulses are palpable bilateral.  CFT is less than 3 seconds bilateral.  Skin temperature is warm to cool proximal to distal bilateral.  No edema     Neuro:  Light touch is intact to the foot bilateral.  No clonus     Derm: Nails 1-5 bilateral are thickened, elongated and crumbly with subungual debris. Skin is supple with normal texture and turgor noted.  Webspaces are clean, dry and intact bilateral.  There are no hyperkeratoses, ulcerations, verruca or other lesions noted.      Ortho: Muscle strength is 5/5 for all pedal groups tested.  Ankle joint, subtalar joint, 1st MPJ and lesser MPJ ROM is full and without pain or crepitus.  The foot type is rectus bilateral off weight bearing.  There are no structural deformities noted.    Assessment/Plan     Painful nail mycosis  DM    Toenails are debrided in length and thickness to avoid infection and for pain relief  Nellie Gutierrez-Valorie, YUN  [address] [place] [address]

## 2025-01-28 ENCOUNTER — HOME CARE VISIT (OUTPATIENT)
Dept: HOME HEALTH SERVICES | Facility: HOME HEALTH | Age: OVER 89
End: 2025-01-28
Payer: MEDICARE

## 2025-01-28 VITALS
TEMPERATURE: 98.2 F | DIASTOLIC BLOOD PRESSURE: 55 MMHG | HEART RATE: 77 BPM | SYSTOLIC BLOOD PRESSURE: 95 MMHG | OXYGEN SATURATION: 97 %

## 2025-01-28 PROCEDURE — G0151 HHCP-SERV OF PT,EA 15 MIN: HCPCS | Mod: HHH

## 2025-01-28 ASSESSMENT — ENCOUNTER SYMPTOMS
PAIN: 1
PAIN LOCATION: RIGHT HIP
PERSON REPORTING PAIN: PATIENT
SUBJECTIVE PAIN PROGRESSION: GRADUALLY IMPROVING
PAIN LOCATION - PAIN SEVERITY: 6/10

## 2025-01-28 ASSESSMENT — ACTIVITIES OF DAILY LIVING (ADL)
HOME_HEALTH_OASIS: 00
OASIS_M1830: 01

## 2025-02-08 LAB
25(OH)D3+25(OH)D2 SERPL-MCNC: 35 NG/ML (ref 30–100)
ALBUMIN SERPL-MCNC: 4.1 G/DL (ref 3.6–5.1)
ALP SERPL-CCNC: 84 U/L (ref 37–153)
ALT SERPL-CCNC: 9 U/L (ref 6–29)
ANION GAP SERPL CALCULATED.4IONS-SCNC: 7 MMOL/L (CALC) (ref 7–17)
AST SERPL-CCNC: 12 U/L (ref 10–35)
BASOPHILS # BLD AUTO: 73 CELLS/UL (ref 0–200)
BASOPHILS NFR BLD AUTO: 1.1 %
BILIRUB SERPL-MCNC: 0.5 MG/DL (ref 0.2–1.2)
BUN SERPL-MCNC: 19 MG/DL (ref 7–25)
CALCIUM SERPL-MCNC: 9.8 MG/DL (ref 8.6–10.4)
CHLORIDE SERPL-SCNC: 104 MMOL/L (ref 98–110)
CHOLEST SERPL-MCNC: 138 MG/DL
CHOLEST/HDLC SERPL: 2.7 (CALC)
CO2 SERPL-SCNC: 29 MMOL/L (ref 20–32)
CREAT SERPL-MCNC: 0.69 MG/DL (ref 0.6–0.95)
EGFRCR SERPLBLD CKD-EPI 2021: 78 ML/MIN/1.73M2
EOSINOPHIL # BLD AUTO: 383 CELLS/UL (ref 15–500)
EOSINOPHIL NFR BLD AUTO: 5.8 %
ERYTHROCYTE [DISTWIDTH] IN BLOOD BY AUTOMATED COUNT: 11.6 % (ref 11–15)
EST. AVERAGE GLUCOSE BLD GHB EST-MCNC: 177 MG/DL
EST. AVERAGE GLUCOSE BLD GHB EST-SCNC: 9.8 MMOL/L
GLUCOSE SERPL-MCNC: 155 MG/DL (ref 65–99)
HBA1C MFR BLD: 7.8 % OF TOTAL HGB
HCT VFR BLD AUTO: 39.1 % (ref 35–45)
HDLC SERPL-MCNC: 51 MG/DL
HGB BLD-MCNC: 12.3 G/DL (ref 11.7–15.5)
LDLC SERPL CALC-MCNC: 63 MG/DL (CALC)
LYMPHOCYTES # BLD AUTO: 2257 CELLS/UL (ref 850–3900)
LYMPHOCYTES NFR BLD AUTO: 34.2 %
MCH RBC QN AUTO: 31.5 PG (ref 27–33)
MCHC RBC AUTO-ENTMCNC: 31.5 G/DL (ref 32–36)
MCV RBC AUTO: 100 FL (ref 80–100)
MONOCYTES # BLD AUTO: 574 CELLS/UL (ref 200–950)
MONOCYTES NFR BLD AUTO: 8.7 %
NEUTROPHILS # BLD AUTO: 3313 CELLS/UL (ref 1500–7800)
NEUTROPHILS NFR BLD AUTO: 50.2 %
NONHDLC SERPL-MCNC: 87 MG/DL (CALC)
PLATELET # BLD AUTO: 398 THOUSAND/UL (ref 140–400)
PMV BLD REES-ECKER: 10.9 FL (ref 7.5–12.5)
POTASSIUM SERPL-SCNC: 4.7 MMOL/L (ref 3.5–5.3)
PROT SERPL-MCNC: 6.3 G/DL (ref 6.1–8.1)
RBC # BLD AUTO: 3.91 MILLION/UL (ref 3.8–5.1)
SODIUM SERPL-SCNC: 140 MMOL/L (ref 135–146)
T4 FREE SERPL-MCNC: 1.2 NG/DL (ref 0.8–1.8)
TRIGL SERPL-MCNC: 161 MG/DL
TSH SERPL-ACNC: 5.2 MIU/L (ref 0.4–4.5)
WBC # BLD AUTO: 6.6 THOUSAND/UL (ref 3.8–10.8)

## 2025-02-12 ENCOUNTER — TELEPHONE (OUTPATIENT)
Dept: PRIMARY CARE | Facility: CLINIC | Age: OVER 89
End: 2025-02-12

## 2025-02-12 ENCOUNTER — APPOINTMENT (OUTPATIENT)
Dept: PRIMARY CARE | Facility: CLINIC | Age: OVER 89
End: 2025-02-12
Payer: MEDICARE

## 2025-02-12 VITALS
SYSTOLIC BLOOD PRESSURE: 123 MMHG | HEIGHT: 66 IN | WEIGHT: 129 LBS | BODY MASS INDEX: 20.73 KG/M2 | HEART RATE: 86 BPM | DIASTOLIC BLOOD PRESSURE: 71 MMHG

## 2025-02-12 DIAGNOSIS — E03.9 ADULT HYPOTHYROIDISM: ICD-10-CM

## 2025-02-12 DIAGNOSIS — E55.9 VITAMIN D DEFICIENCY: ICD-10-CM

## 2025-02-12 DIAGNOSIS — C18.2 MALIGNANT NEOPLASM OF ASCENDING COLON (MULTI): ICD-10-CM

## 2025-02-12 DIAGNOSIS — G89.29 CHRONIC LOW BACK PAIN, UNSPECIFIED BACK PAIN LATERALITY, UNSPECIFIED WHETHER SCIATICA PRESENT: ICD-10-CM

## 2025-02-12 DIAGNOSIS — M54.50 CHRONIC LOW BACK PAIN, UNSPECIFIED BACK PAIN LATERALITY, UNSPECIFIED WHETHER SCIATICA PRESENT: ICD-10-CM

## 2025-02-12 DIAGNOSIS — E78.2 MIXED HYPERLIPIDEMIA: Primary | ICD-10-CM

## 2025-02-12 DIAGNOSIS — E78.1 ESSENTIAL HYPERTRIGLYCERIDEMIA: ICD-10-CM

## 2025-02-12 DIAGNOSIS — M25.551 PAIN OF RIGHT HIP: ICD-10-CM

## 2025-02-12 DIAGNOSIS — H35.3213 EXUDATIVE AGE-RELATED MACULAR DEGENERATION, RIGHT EYE, WITH INACTIVE SCAR: ICD-10-CM

## 2025-02-12 DIAGNOSIS — N18.31 STAGE 3A CHRONIC KIDNEY DISEASE (MULTI): ICD-10-CM

## 2025-02-12 DIAGNOSIS — R26.81 UNSTEADY GAIT: ICD-10-CM

## 2025-02-12 DIAGNOSIS — I25.10 CORONARY ARTERY DISEASE INVOLVING NATIVE CORONARY ARTERY OF NATIVE HEART WITHOUT ANGINA PECTORIS: ICD-10-CM

## 2025-02-12 PROCEDURE — 1123F ACP DISCUSS/DSCN MKR DOCD: CPT | Performed by: INTERNAL MEDICINE

## 2025-02-12 PROCEDURE — 1159F MED LIST DOCD IN RCRD: CPT | Performed by: INTERNAL MEDICINE

## 2025-02-12 PROCEDURE — G2211 COMPLEX E/M VISIT ADD ON: HCPCS | Performed by: INTERNAL MEDICINE

## 2025-02-12 PROCEDURE — 1036F TOBACCO NON-USER: CPT | Performed by: INTERNAL MEDICINE

## 2025-02-12 PROCEDURE — 1157F ADVNC CARE PLAN IN RCRD: CPT | Performed by: INTERNAL MEDICINE

## 2025-02-12 PROCEDURE — 1160F RVW MEDS BY RX/DR IN RCRD: CPT | Performed by: INTERNAL MEDICINE

## 2025-02-12 PROCEDURE — 99214 OFFICE O/P EST MOD 30 MIN: CPT | Performed by: INTERNAL MEDICINE

## 2025-02-12 ASSESSMENT — LIFESTYLE VARIABLES
SKIP TO QUESTIONS 9-10: 1
HOW OFTEN DO YOU HAVE A DRINK CONTAINING ALCOHOL: NEVER
HOW MANY STANDARD DRINKS CONTAINING ALCOHOL DO YOU HAVE ON A TYPICAL DAY: PATIENT DOES NOT DRINK
AUDIT-C TOTAL SCORE: 0
HOW OFTEN DO YOU HAVE SIX OR MORE DRINKS ON ONE OCCASION: NEVER
HAS A RELATIVE, FRIEND, DOCTOR, OR ANOTHER HEALTH PROFESSIONAL EXPRESSED CONCERN ABOUT YOUR DRINKING OR SUGGESTED YOU CUT DOWN: NO
AUDIT TOTAL SCORE: 0
HAVE YOU OR SOMEONE ELSE BEEN INJURED AS A RESULT OF YOUR DRINKING: NO

## 2025-02-12 ASSESSMENT — PATIENT HEALTH QUESTIONNAIRE - PHQ9
2. FEELING DOWN, DEPRESSED OR HOPELESS: NOT AT ALL
1. LITTLE INTEREST OR PLEASURE IN DOING THINGS: NOT AT ALL
SUM OF ALL RESPONSES TO PHQ9 QUESTIONS 1 AND 2: 0

## 2025-02-12 NOTE — TELEPHONE ENCOUNTER
----- Message from Cloton Menjivar sent at 2/10/2025  1:18 PM EST -----  Labs acceptable but there are a few abnormalities similar to previous labs.  Please advise patient to continue the same medications and follow-up to review in detail and discuss the management options as already scheduled appointment.

## 2025-02-12 NOTE — PROGRESS NOTES
Assessment/Plan     89 years old female who is functional and independent at this time has history of colon cancer and had surgery has other multiple chronic problems including diabetes on insulin.  Her hemoglobin A1c is slightly elevated at 7.8.  In view of patient's age and her other comorbid conditions at this time I would continue the same and patient has an upcoming appointment with the endocrinologist visit within a month.    Patient has history of hypothyroidism and clinically she is euthyroid.  Her TSH is slightly elevated but will continue the same thyroid dose at this time and she will review this with the endocrinologist also.    Patient has chronic right hip pain due to arthritis in the hip and also in the back.  Patient follows with orthopedic surgeon and also pain management.  She does not want the gabapentin which I agreed to prevent any falls.  She does not want any surgery but she agrees for any local injections.  She we will discuss this with the orthopedic surgeon and the appointment is coming up.  Even though patient rates the pain as high sometimes 6-7 out of 10 she says she is able to function and she does not want any pain medication orally.  She will continue the diclofenac gel.    Patient walk with a cane and discussed about the fall prevention.    Her coronary artery disease is stable.    Her eyes are stable and her last visit with ophthalmologist was 3 months ago.  Patient says she will follow-up with the ophthalmologist    I agreed with the patient about moving with the her nice to [facility] to get some help during the day today living.  If she is still in town she will follow-up with me with the blood test as ordered in 3 months.    Problem List Items Addressed This Visit       Malignant neoplasm of ascending colon (Multi)    Adult hypothyroidism    Relevant Orders    TSH with reflex to Free T4 if abnormal    CAD (coronary artery disease)    Chronic low back pain    Essential  hypertriglyceridemia    Relevant Orders    CBC and Auto Differential    Comprehensive Metabolic Panel    Hyperlipidemia - Primary    Relevant Orders    Comprehensive Metabolic Panel    Vitamin D deficiency    RESOLVED: Stage 3a chronic kidney disease (Multi)    Relevant Orders    CBC and Auto Differential    Unsteady gait    Pain of right hip    Exudative age-related macular degeneration, right eye, with inactive scar       Subjective     Patient ID: Lupis Jackson is a 89 y.o. female who presents for Follow-up.    History of present illness  89 years old female who is independent lives on her own came for a follow-up visit after she has had the blood test done.    She is taking insulin for her blood sugars and also she is seeing endocrinologist regularly and the records are reviewed in detail.    She is taking the thyroid medication regularly.    She is taking the alendronate and tolerates it well.    She has chronic pain in her right leg and mainly the hip area.  She had seen the pain management and she is going to be following up with the pain management and the orthopedic.  She says the steroid injection did not help.  She was on gabapentin but it made her feel weird so she finished the gabapentin and she says she does not want any further refills on it.  I had a communication with the physical therapist was helping the patient but patient says that overall she is doing well and she is functional.    She is planning to move to [facility] with the niece who would help her in living situation because she feels she will not be able to manage on her own in a house.  She does not want to go to a nursing home or rehab.    Patient has had no history of recent falls.  She denies any urine symptoms.    Family History   Problem Relation Name Age of Onset    No Known Problems Mother      No Known Problems Father        Social History     Socioeconomic History    Marital status:      Spouse name: Not on file    Number of  children: Not on file    Years of education: Not on file    Highest education level: Not on file   Occupational History    Not on file   Tobacco Use    Smoking status: Former     Current packs/day: 0.00     Types: Cigarettes     Quit date:      Years since quittin.1     Passive exposure: Past    Smokeless tobacco: Never   Vaping Use    Vaping status: Never Used   Substance and Sexual Activity    Alcohol use: Never    Drug use: Never    Sexual activity: Defer   Other Topics Concern    Not on file   Social History Narrative    Not on file     Social Drivers of Health     Financial Resource Strain: Low Risk  (10/16/2023)    Overall Financial Resource Strain (CARDIA)     Difficulty of Paying Living Expenses: Not hard at all   Food Insecurity: Not on file   Transportation Needs: No Transportation Needs (2025)    OASIS : Transportation     Lack of Transportation (Medical): No     Lack of Transportation (Non-Medical): No     Patient Unable or Declines to Respond: No   Physical Activity: Not on file   Stress: Not on file   Social Connections: Feeling Socially Integrated (2025)    OASIS : Social Isolation     Frequency of experiencing loneliness or isolation: Never   Intimate Partner Violence: Not on file   Housing Stability: Low Risk  (10/16/2023)    Housing Stability Vital Sign     Unable to Pay for Housing in the Last Year: No     Number of Places Lived in the Last Year: 1     Unstable Housing in the Last Year: No      Patient has no known allergies.   Current Outpatient Medications   Medication Sig Dispense Refill    alendronate (Fosamax) 70 mg tablet Take 1 tablet (70 mg) by mouth every 7 days. Take in the morning with a full glass of water, on an empty stomach, and do not take anything else by mouth or lie down for the next 30 min. 4 tablet 11    aspirin 81 mg EC tablet Take 1 tablet (81 mg) by mouth once daily.      atorvastatin (Lipitor) 10 mg tablet TAKE ONE TABLET BY MOUTH DAILY IN THE  "EVENING 90 tablet 3    blood sugar diagnostic (OneTouch Ultra Test) strip TEST TWICE DAILY 200 each 1    cholecalciferol (Vitamin D-3) 50 MCG (2000 UT) tablet Take 1 tablet (2,000 Units) by mouth once daily.      cholestyramine (Questran) 4 gram packet Take 1 packet (4 g) by mouth once daily. 60 packet 2    diclofenac sodium (Voltaren) 1 % gel Apply 2.25 inches (2 g) topically 2 times a day. 50 g 1    insulin glargine (Lantus Solostar U-100 Insulin) 100 unit/mL (3 mL) pen Inject 6 Units under the skin once daily at bedtime. 15 mL 0    Lactobacillus acidophilus (Probiotic Acidophilus) 250 million cell capsule Take 1 capsule by mouth once daily. 30 capsule 1    levothyroxine (Synthroid, Levoxyl) 112 mcg tablet TAKE ONE TABLET BY MOUTH DAILY 90 tablet 3    meclizine (Antivert) 12.5 mg tablet TAKE ONE TABLET BY MOUTH EVERY 12 HOURS AS NEEDED 40 tablet 0    mv-calcium-min-iron fm-FA-vitK (One-A-Day Women's Complete,vK,) 18 mg-400 mcg- 25 mcg tablet Take 1 tablet by mouth once daily.      nitroglycerin (Nitrostat) 0.4 mg SL tablet Place 1 tablet (0.4 mg) under the tongue every 5 minutes if needed for chest pain.      nut.tx.gluc intol,lf,soy-fiber (Glucerna 1.5 Maynor) 0.08-1.5 gram-kcal/mL liquid Take 237 mL by mouth 2 times daily after breakfast and lunch. 4740 mL 3    pantoprazole (ProtoNix) 40 mg EC tablet TAKE ONE TABLET BY MOUTH DAILY. DO NOT CRUSH, CHEW OR SPLIT 90 tablet 1    PARoxetine (Paxil) 20 mg tablet Take 1 tablet (20 mg) by mouth once daily in the morning. 90 tablet 3    pen needle, diabetic (Sure Comfort Pen Needle) 32 gauge x 5/32\" needle USE ONE EVERY  each 2    vitamins A,C,E-zinc-copper (PreserVision AREDS) 2,148 mcg-113 mg-45 mg-17.4mg tablet Take 1 tablet by mouth once daily.      gabapentin (Neurontin) 100 mg capsule Take 1 capsule (100 mg) by mouth once daily. (Patient not taking: Reported on 2/12/2025) 30 capsule 1     No current facility-administered medications for this visit.    "       Objective     Vitals:    02/12/25 1132   BP: 123/71   Pulse: 86        Physical Exam    Thin built pleasant elderly female,  with no apparent distress. Alert oriented  Some difficulty in hearing but able to communicate.  Skin:  Normal turgor.  No rash.  Head:  Normocephalic, atraumatic.  Eyes:  Pupils are equal, round,.  No pallor of conjunctivae.  Mouth has moist oral mucosa.   Neck:  Supple.  No JVD.  No carotid bruit.  No thyromegaly. No cervical lymphadenopathy.  No clubbing, significant peripheral osteoarthritis noted  Has kyphoscoliosis of the thoracic spine noted  Chest:  Vesicular breathing Bilaterally moderate air entry and bilaterally clear to auscultation.  No wheezing.  No crackles.  Heart:  Regular rate with intermittent ectopics and rhythm.  S1, S2 positive.  No murmur.  Abdomen:  Soft, left lower quadrant scar is well-healed.  Bowel sounds are positive.  No organomegaly.  Extremities:  Bilaterally no pedal pitting edema.  Left hip movements are full, right hip movement is restricted due to pain.  No swelling of the hip or groin area.  There is no warmness or redness in the groin or hip area.  Bilaterally 2+ dorsalis pedis pulses.  No calf tenderness. Homans sign is negative.  Neuro Exam: No focal signs.  Able to walk independently.    Problem List Items Addressed This Visit       Malignant neoplasm of ascending colon (Multi)    Adult hypothyroidism    Relevant Orders    TSH with reflex to Free T4 if abnormal    CAD (coronary artery disease)    Chronic low back pain    Essential hypertriglyceridemia    Relevant Orders    CBC and Auto Differential    Comprehensive Metabolic Panel    Hyperlipidemia - Primary    Relevant Orders    Comprehensive Metabolic Panel    Vitamin D deficiency    RESOLVED: Stage 3a chronic kidney disease (Multi)    Relevant Orders    CBC and Auto Differential    Unsteady gait    Pain of right hip    Exudative age-related macular degeneration, right eye, with inactive scar         Orders Placed This Encounter   Procedures    CBC and Auto Differential     Standing Status:   Future     Standing Expiration Date:   2/12/2026     Order Specific Question:   Release result to MyChart     Answer:   Immediate    Comprehensive Metabolic Panel     Standing Status:   Future     Standing Expiration Date:   2/12/2026     Order Specific Question:   Release result to MyChart     Answer:   Immediate    TSH with reflex to Free T4 if abnormal     Standing Status:   Future     Standing Expiration Date:   2/12/2026     Order Specific Question:   Release result to MyChart     Answer:   Immediate        Lab Results   Component Value Date    WBC 6.6 02/07/2025    HGB 12.3 02/07/2025    HCT 39.1 02/07/2025     02/07/2025    CHOL 138 02/07/2025    TRIG 161 (H) 02/07/2025    HDL 51 02/07/2025    ALT 9 02/07/2025    AST 12 02/07/2025     02/07/2025    K 4.7 02/07/2025     02/07/2025    CREATININE 0.69 02/07/2025    BUN 19 02/07/2025    CO2 29 02/07/2025    TSH 5.20 (H) 02/07/2025    INR 0.9 10/16/2023    HGBA1C 7.8 (H) 02/07/2025     Lab Results   Component Value Date    CHOL 138 02/07/2025    LDLCALC 63 02/07/2025    CHHDL 2.7 02/07/2025       No results found.

## 2025-02-23 DIAGNOSIS — E13.69 OTHER SPECIFIED DIABETES MELLITUS WITH OTHER SPECIFIED COMPLICATION, WITH LONG-TERM CURRENT USE OF INSULIN: ICD-10-CM

## 2025-02-23 DIAGNOSIS — Z79.4 OTHER SPECIFIED DIABETES MELLITUS WITH OTHER SPECIFIED COMPLICATION, WITH LONG-TERM CURRENT USE OF INSULIN: ICD-10-CM

## 2025-02-24 RX ORDER — PEN NEEDLE, DIABETIC 32GX 5/32"
NEEDLE, DISPOSABLE MISCELLANEOUS
Qty: 100 EACH | Refills: 1 | Status: SHIPPED | OUTPATIENT
Start: 2025-02-24

## 2025-02-25 ENCOUNTER — APPOINTMENT (OUTPATIENT)
Dept: ENDOCRINOLOGY | Facility: CLINIC | Age: OVER 89
End: 2025-02-25
Payer: MEDICARE

## 2025-02-25 VITALS
DIASTOLIC BLOOD PRESSURE: 62 MMHG | BODY MASS INDEX: 20.73 KG/M2 | HEIGHT: 66 IN | SYSTOLIC BLOOD PRESSURE: 120 MMHG | WEIGHT: 129 LBS

## 2025-02-25 DIAGNOSIS — E10.69 TYPE 1 DIABETES MELLITUS WITH OTHER SPECIFIED COMPLICATION: Primary | ICD-10-CM

## 2025-02-25 DIAGNOSIS — E03.9 ADULT HYPOTHYROIDISM: ICD-10-CM

## 2025-02-25 DIAGNOSIS — R42 DIZZINESS: ICD-10-CM

## 2025-02-25 LAB — POC FINGERSTICK BLOOD GLUCOSE: 230 MG/DL (ref 70–100)

## 2025-02-25 PROCEDURE — 82962 GLUCOSE BLOOD TEST: CPT | Performed by: HOSPITALIST

## 2025-02-25 PROCEDURE — 1123F ACP DISCUSS/DSCN MKR DOCD: CPT | Performed by: HOSPITALIST

## 2025-02-25 PROCEDURE — 99214 OFFICE O/P EST MOD 30 MIN: CPT | Performed by: HOSPITALIST

## 2025-02-25 PROCEDURE — 1157F ADVNC CARE PLAN IN RCRD: CPT | Performed by: HOSPITALIST

## 2025-02-25 RX ORDER — MECLIZINE HCL 12.5 MG 12.5 MG/1
25 TABLET ORAL EVERY 12 HOURS PRN
Qty: 40 TABLET | Refills: 0 | Status: SHIPPED | OUTPATIENT
Start: 2025-02-25

## 2025-02-25 ASSESSMENT — ENCOUNTER SYMPTOMS
EYE ITCHING: 0
SHORTNESS OF BREATH: 0
ABDOMINAL PAIN: 0
DIARRHEA: 0
TREMORS: 0
ABDOMINAL DISTENTION: 0
CONSTIPATION: 0
CONSTITUTIONAL NEGATIVE: 1
DYSURIA: 0
PHOTOPHOBIA: 0
NAUSEA: 1
VOMITING: 0
HEADACHES: 0
SLEEP DISTURBANCE: 0
DIZZINESS: 1
LIGHT-HEADEDNESS: 1
AGITATION: 0
ARTHRALGIAS: 0
SORE THROAT: 0
TROUBLE SWALLOWING: 0
NERVOUS/ANXIOUS: 0
PALPITATIONS: 0
FREQUENCY: 0
CHEST TIGHTNESS: 0
VOICE CHANGE: 0

## 2025-02-25 NOTE — PROGRESS NOTES
Subjective   Patient ID: Lupis Jackson is a 89 y.o. female who presents for Diabetes (Type 1 Diabetes. Currently testing glucose once daily./PCP: Otto /Podiatry: Matt /Actos - she mentions she had SE /Tradjenta - too expensive//glucometer downloaded checks every morning, onetouch mini meter/).  Lab Results   Component Value Date    HGBA1C 7.8 (H) 02/07/2025      HPI see assessment and plan    Review of Systems   Constitutional: Negative.    HENT:  Negative for sore throat, trouble swallowing and voice change.    Eyes:  Negative for photophobia, itching and visual disturbance.   Respiratory:  Negative for chest tightness and shortness of breath.    Cardiovascular:  Negative for chest pain and palpitations.   Gastrointestinal:  Positive for nausea. Negative for abdominal distention, abdominal pain, constipation, diarrhea and vomiting.   Endocrine: Negative for cold intolerance, heat intolerance and polyuria.   Genitourinary:  Negative for dysuria and frequency.   Musculoskeletal:  Negative for arthralgias.   Skin:  Negative for pallor.   Allergic/Immunologic: Negative for environmental allergies.   Neurological:  Positive for dizziness and light-headedness. Negative for tremors and headaches.   Psychiatric/Behavioral:  Negative for agitation and sleep disturbance. The patient is not nervous/anxious.        Objective   Physical Exam  Constitutional:       Appearance: Normal appearance.   HENT:      Head: Normocephalic.      Nose: Nose normal.      Mouth/Throat:      Mouth: Mucous membranes are moist.   Eyes:      Extraocular Movements: Extraocular movements intact.   Cardiovascular:      Rate and Rhythm: Normal rate.   Pulmonary:      Effort: Pulmonary effort is normal. No respiratory distress.   Abdominal:      General: There is no distension.   Musculoskeletal:         General: Normal range of motion.      Cervical back: Normal range of motion and neck supple.   Skin:     General: Skin is warm and dry.   Neurological:  "     Mental Status: She is alert and oriented to person, place, and time.   Psychiatric:         Mood and Affect: Mood normal.      Visit Vitals  /62   Ht 1.676 m (5' 6\")   Wt 58.5 kg (129 lb)   BMI 20.82 kg/m²   OB Status Postmenopausal   Smoking Status Former   BSA 1.65 m²        Assessment/Plan   Diagnoses and all orders for this visit:  Type 1 diabetes mellitus with other specified complication  -     POCT fingerstick glucose manually resulted  Dizziness  -     meclizine (Antivert) 12.5 mg tablet; Take 2 tablets (25 mg) by mouth every 12 hours if needed for dizziness (dizziness).  Adult hypothyroidism            T2 DM , controlled   DM:   Lantus 0-0-0-6     Past medications  not taking any actos or tradjenta   actos - she mentions she had SE   tradjenta - too expensive      glucometer reviewed and checks every morning.  Average blood sugar 172  she denies any hypoglycemia   A1c  above goal of 7.5%      INTERVAL history :   Dx with adenocarcinoma of colon in the past  Diarrhea resolved after starting cholestyramine   c/o dizziness-room spinning sensation and some nausea     PLAN:   No change in regimen given her age would not add any new medication  -Diarrhea resolved after starting cholestyramine  She mentions she is okay with the cost of insulin and did not want samples  - clinically euthyroid, continue same dose, TSH 5.20 in February 2025  - on statin   - BP at goal, on ACE i       OFF NOTE calcium higher range of normal- possible mild hyperparathyroidism , which is not new, oral hydration   not a surgical candidate. will continue to follow.     #vertigo: Complains of room spinning sensation and nausea since morning.  I observed her for 1 hour in the clinic and followed her symptoms.  She had her nephew Christopher with her   Blood pressure and blood sugar were within normal  She complains of room spinning even while sitting.  Advised to go to the ER but she refused, discussed risks.  Sent meclizine to her " pharmacy.  Advised to call me tomorrow and update regarding symptoms  If symptoms worse again encouraged to go to the ER.  Discussed plan with nephew as well          RTC 4m       SH - she was in [place] moved here for her daughter who passed away and her son in law is sick now   she has no GK, she was working until the age of 89

## 2025-02-26 ENCOUNTER — APPOINTMENT (OUTPATIENT)
Dept: ORTHOPEDIC SURGERY | Facility: CLINIC | Age: OVER 89
End: 2025-02-26
Payer: MEDICARE

## 2025-03-03 ENCOUNTER — APPOINTMENT (OUTPATIENT)
Dept: ORTHOPEDIC SURGERY | Facility: CLINIC | Age: OVER 89
End: 2025-03-03
Payer: MEDICARE

## 2025-03-05 ENCOUNTER — APPOINTMENT (OUTPATIENT)
Dept: PRIMARY CARE | Facility: CLINIC | Age: OVER 89
End: 2025-03-05
Payer: MEDICARE

## 2025-03-12 DIAGNOSIS — N18.31 STAGE 3A CHRONIC KIDNEY DISEASE (MULTI): ICD-10-CM

## 2025-03-12 DIAGNOSIS — E78.2 MIXED HYPERLIPIDEMIA: ICD-10-CM

## 2025-03-12 DIAGNOSIS — E78.1 ESSENTIAL HYPERTRIGLYCERIDEMIA: ICD-10-CM

## 2025-03-12 DIAGNOSIS — E03.9 ADULT HYPOTHYROIDISM: ICD-10-CM

## 2025-03-13 DIAGNOSIS — R10.12 LEFT UPPER QUADRANT ABDOMINAL PAIN: ICD-10-CM

## 2025-03-14 RX ORDER — PANTOPRAZOLE SODIUM 40 MG/1
40 TABLET, DELAYED RELEASE ORAL DAILY
Qty: 90 TABLET | Refills: 1 | Status: SHIPPED | OUTPATIENT
Start: 2025-03-14

## 2025-03-19 ENCOUNTER — APPOINTMENT (OUTPATIENT)
Dept: ORTHOPEDIC SURGERY | Facility: CLINIC | Age: OVER 89
End: 2025-03-19
Payer: MEDICARE

## 2025-03-19 DIAGNOSIS — M16.11 PRIMARY OSTEOARTHRITIS OF RIGHT HIP: Primary | ICD-10-CM

## 2025-03-19 PROCEDURE — 1123F ACP DISCUSS/DSCN MKR DOCD: CPT | Performed by: STUDENT IN AN ORGANIZED HEALTH CARE EDUCATION/TRAINING PROGRAM

## 2025-03-19 PROCEDURE — 99213 OFFICE O/P EST LOW 20 MIN: CPT | Performed by: STUDENT IN AN ORGANIZED HEALTH CARE EDUCATION/TRAINING PROGRAM

## 2025-03-19 PROCEDURE — 1159F MED LIST DOCD IN RCRD: CPT | Performed by: STUDENT IN AN ORGANIZED HEALTH CARE EDUCATION/TRAINING PROGRAM

## 2025-03-19 PROCEDURE — 1157F ADVNC CARE PLAN IN RCRD: CPT | Performed by: STUDENT IN AN ORGANIZED HEALTH CARE EDUCATION/TRAINING PROGRAM

## 2025-03-20 ENCOUNTER — APPOINTMENT (OUTPATIENT)
Dept: PODIATRY | Facility: CLINIC | Age: OVER 89
End: 2025-03-20
Payer: MEDICARE

## 2025-03-20 NOTE — PROGRESS NOTES
Department of Orthopaedic Surgery  Division of Adult Reconstruction    Chief Complaint: Right hip pain    HPI:  Patient returns to clinic to discuss right hip pain.  She continues to have pain deep in the groin of the right hip.  It waxes and wanes.  It is always present but does not significantly affect her function/quality of life.  Latest A1c 7.8.    Presenting HPI:  Lupis Jackson is a pleasant 89 y.o. year-old female with history of diabetes (last A1c 7.3), CAD, and osteoporosis who is seen today for evaluation of right hip pain.  Patient reports pain deep in the right groin for the last several years.  She also complains of low back pain knee pain, and ankle pain in the right lower extremity.  It is aggravated by activity and relieved with rest.  She has tried several nonoperative measures including activity modification, over-the-counter medication, corticosteroid injection.  Her last injection was one month ago and provided some relief for 3 days. She lives with her nephew. She has an appointment in December with pain management.     Review of Symptoms:  The patient denies any fever, chills, chest pain, shortness of breath or difficulty breathing.  Patient denies any numbness, tingling, or radicular symptoms.      Adult patient history sheet was filled out by the patient today in clinic and will be scanned into the EMR.  I personally reviewed this form which will be scanned into the EMR.  This includes Past Medical History, Past Surgical History, Medications, Allergies, Social History, Family History and 12 point review of systems.    Past Medical History:  Past Medical History:   Diagnosis Date    Abnormal mammogram 10/09/2023    Breast cancer (Multi)     Cataract     Colorectal cancer (Multi)     Coronary artery disease     Dizziness     Essential (primary) hypertension 04/07/2022    Essential hypertension    HL (hearing loss)     Hyperlipidemia, unspecified 07/26/2021    Hyperlipidemia    Hypothyroidism      Myocardial infarction (Multi)     Presence of coronary angioplasty implant and graft 07/27/2020    History of coronary artery stent placement    Stage 3a chronic kidney disease (Multi) 01/16/2024    Type 2 diabetes mellitus without complications (Multi) 12/15/2022    Diabetes mellitus    Vitamin D deficiency, unspecified 04/22/2019    Vitamin D deficiency       Past Surgical History:  Past Surgical History:   Procedure Laterality Date    CATARACT EXTRACTION      COLON SURGERY      COLONOSCOPY      OTHER SURGICAL HISTORY  12/10/2018    Right mastectomy     Allergies:  NKDA    Medications:  Current Outpatient Medications on File Prior to Visit   Medication Sig Dispense Refill    alendronate (Fosamax) 70 mg tablet Take 1 tablet (70 mg) by mouth every 7 days. Take in the morning with a full glass of water, on an empty stomach, and do not take anything else by mouth or lie down for the next 30 min. 4 tablet 11    aspirin 81 mg EC tablet Take 1 tablet (81 mg) by mouth once daily.      atorvastatin (Lipitor) 10 mg tablet TAKE ONE TABLET BY MOUTH DAILY IN THE EVENING 90 tablet 3    blood sugar diagnostic (OneTouch Ultra Test) strip TEST TWICE DAILY 200 each 1    cholecalciferol (Vitamin D-3) 50 MCG (2000 UT) tablet Take 1 tablet (2,000 Units) by mouth once daily.      cholestyramine (Questran) 4 gram packet Take 1 packet (4 g) by mouth once daily. 60 packet 2    diclofenac sodium (Voltaren) 1 % gel Apply 2.25 inches (2 g) topically 2 times a day. 50 g 1    gabapentin (Neurontin) 100 mg capsule Take 1 capsule (100 mg) by mouth once daily. (Patient not taking: Reported on 2/12/2025) 30 capsule 1    insulin glargine (Lantus Solostar U-100 Insulin) 100 unit/mL (3 mL) pen Inject 6 Units under the skin once daily at bedtime. 15 mL 0    Lactobacillus acidophilus (Probiotic Acidophilus) 250 million cell capsule Take 1 capsule by mouth once daily. 30 capsule 1    levothyroxine (Synthroid, Levoxyl) 112 mcg tablet TAKE ONE TABLET BY  "MOUTH DAILY 90 tablet 3    meclizine (Antivert) 12.5 mg tablet Take 2 tablets (25 mg) by mouth every 12 hours if needed for dizziness (dizziness). 40 tablet 0    mv-calcium-min-iron fm-FA-vitK (One-A-Day Women's Complete,vK,) 18 mg-400 mcg- 25 mcg tablet Take 1 tablet by mouth once daily.      nitroglycerin (Nitrostat) 0.4 mg SL tablet Place 1 tablet (0.4 mg) under the tongue every 5 minutes if needed for chest pain.      nut.tx.gluc intol,lf,soy-fiber (Glucerna 1.5 Maynor) 0.08-1.5 gram-kcal/mL liquid Take 237 mL by mouth 2 times daily after breakfast and lunch. 4740 mL 3    pantoprazole (ProtoNix) 40 mg EC tablet TAKE ONE TABLET BY MOUTH ONCE DAILY. DO NOT CRUSH, CHEW OR SPLIT 90 tablet 1    PARoxetine (Paxil) 20 mg tablet Take 1 tablet (20 mg) by mouth once daily in the morning. 90 tablet 3    pen needle, diabetic (Sure Comfort Pen Needle) 32 gauge x 5/32\" needle USE ONE EVERY  each 1    vitamins A,C,E-zinc-copper (PreserVision AREDS) 2,148 mcg-113 mg-45 mg-17.4mg tablet Take 1 tablet by mouth once daily.      [DISCONTINUED] pantoprazole (ProtoNix) 40 mg EC tablet TAKE ONE TABLET BY MOUTH DAILY. DO NOT CRUSH, CHEW OR SPLIT 90 tablet 1     No current facility-administered medications on file prior to visit.       Social History:  Social History     Occupational History    Not on file   Tobacco Use    Smoking status: Former     Current packs/day: 0.00     Types: Cigarettes     Quit date:      Years since quittin.2     Passive exposure: Past    Smokeless tobacco: Never   Vaping Use    Vaping status: Never Used   Substance and Sexual Activity    Alcohol use: Never    Drug use: Never    Sexual activity: Defer     Family History:  Family History   Problem Relation Name Age of Onset    No Known Problems Mother      No Known Problems Father       Exam:  General: Well-appearing female in no acute distress.  Awake, alert and oriented.  Pleasant and cooperative.  Respiratory: Non-labored breathing  Mood: " Euthymic   Gait: Slow, antalgic  Assistive Device: 4 point cane    Right Hip  Range of motion:   Flexion: 90  Internal Rotation: neutral  External Rotation: 20  Hip Flexor Strength: 5/5  Abductor Strength: 5/5  Adductor Strength: 5/5  Sensation: Intact to light touch distally  Motor function: Able to fire TA, EHL, G/S  Pulses: Palpable DP pulse    Imaging interpretation:   AP pelvis, AP hip and false profile views: Independent review of left hip and pelvis x-rays was performed. The findings were reviewed with the patient. There are severe degenerative changes of the right hip with associated joint space narrowing, subchondral sclerosis, and osteophyte formation.     Assessment and Plan:   89-year-old female with history of diabetes (A1c 7.8) and osteoporosis with right hip pain secondary to severe hip osteoarthritis.  She is also debilitated by low back pain.  We discussed treatment options once again, and she does not feel like she would wish to undergo surgery, and I tend to agree it would not be safe given her age, osteoporosis, and overall health.  I encouraged her to reach out if she wishes to readdress the topic or if she wants to be set up for an injection.     Eliot Abdul MD  Department of Orthopaedic Surgery  Division of Adult Reconstruction  , [facility]

## 2025-03-27 ENCOUNTER — APPOINTMENT (OUTPATIENT)
Dept: PODIATRY | Facility: CLINIC | Age: OVER 89
End: 2025-03-27
Payer: MEDICARE

## 2025-03-27 DIAGNOSIS — M79.675 PAIN IN TOES OF BOTH FEET: Primary | ICD-10-CM

## 2025-03-27 DIAGNOSIS — Z79.4 OTHER SPECIFIED DIABETES MELLITUS WITH OTHER SPECIFIED COMPLICATION, WITH LONG-TERM CURRENT USE OF INSULIN: ICD-10-CM

## 2025-03-27 DIAGNOSIS — E13.69 OTHER SPECIFIED DIABETES MELLITUS WITH OTHER SPECIFIED COMPLICATION, WITH LONG-TERM CURRENT USE OF INSULIN: ICD-10-CM

## 2025-03-27 DIAGNOSIS — M79.674 PAIN IN TOES OF BOTH FEET: Primary | ICD-10-CM

## 2025-03-27 DIAGNOSIS — B35.1 FUNGAL NAIL INFECTION: ICD-10-CM

## 2025-03-27 PROCEDURE — 11721 DEBRIDE NAIL 6 OR MORE: CPT | Performed by: PODIATRIST

## 2025-03-27 NOTE — PROGRESS NOTES
History Of Present Illness  Lupis Jackson is a 89 y.o. female presenting for diabetic nail care. Patient complains with painful elongated nails.    PCP Colton Menjivar MD  last visit 02/12/25     Past Medical History  She has a past medical history of Abnormal mammogram (10/09/2023), Breast cancer (Multi), Cataract, Colorectal cancer (Multi), Coronary artery disease, Dizziness, Essential (primary) hypertension (04/07/2022), HL (hearing loss), Hyperlipidemia, unspecified (07/26/2021), Hypothyroidism, Myocardial infarction (Multi), Presence of coronary angioplasty implant and graft (07/27/2020), Stage 3a chronic kidney disease (Multi) (01/16/2024), Type 2 diabetes mellitus without complications (Multi) (12/15/2022), and Vitamin D deficiency, unspecified (04/22/2019).    Surgical History  She has a past surgical history that includes Other surgical history (12/10/2018); Colonoscopy; Cataract extraction; and Colon surgery.     Social History  She reports that she quit smoking about 49 years ago. Her smoking use included cigarettes. She has been exposed to tobacco smoke. She has never used smokeless tobacco. She reports that she does not drink alcohol and does not use drugs.    Family History  Family History   Problem Relation Name Age of Onset    No Known Problems Mother      No Known Problems Father          Allergies  Patient has no known allergies.    Medications  Current Outpatient Medications   Medication Sig Dispense Refill    alendronate (Fosamax) 70 mg tablet Take 1 tablet (70 mg) by mouth every 7 days. Take in the morning with a full glass of water, on an empty stomach, and do not take anything else by mouth or lie down for the next 30 min. 4 tablet 11    aspirin 81 mg EC tablet Take 1 tablet (81 mg) by mouth once daily.      atorvastatin (Lipitor) 10 mg tablet TAKE ONE TABLET BY MOUTH DAILY IN THE EVENING 90 tablet 3    blood sugar diagnostic (OneTouch Ultra Test) strip TEST TWICE DAILY 200 each 1    cholecalciferol  "(Vitamin D-3) 50 MCG (2000 UT) tablet Take 1 tablet (2,000 Units) by mouth once daily.      cholestyramine (Questran) 4 gram packet Take 1 packet (4 g) by mouth once daily. 60 packet 2    diclofenac sodium (Voltaren) 1 % gel Apply 2.25 inches (2 g) topically 2 times a day. 50 g 1    insulin glargine (Lantus Solostar U-100 Insulin) 100 unit/mL (3 mL) pen Inject 6 Units under the skin once daily at bedtime. 15 mL 0    Lactobacillus acidophilus (Probiotic Acidophilus) 250 million cell capsule Take 1 capsule by mouth once daily. 30 capsule 1    levothyroxine (Synthroid, Levoxyl) 112 mcg tablet TAKE ONE TABLET BY MOUTH DAILY 90 tablet 3    meclizine (Antivert) 12.5 mg tablet Take 2 tablets (25 mg) by mouth every 12 hours if needed for dizziness (dizziness). 40 tablet 0    mv-calcium-min-iron fm-FA-vitK (One-A-Day Women's Complete,vK,) 18 mg-400 mcg- 25 mcg tablet Take 1 tablet by mouth once daily.      nitroglycerin (Nitrostat) 0.4 mg SL tablet Place 1 tablet (0.4 mg) under the tongue every 5 minutes if needed for chest pain.      nut.tx.gluc intol,lf,soy-fiber (Glucerna 1.5 Maynor) 0.08-1.5 gram-kcal/mL liquid Take 237 mL by mouth 2 times daily after breakfast and lunch. 4740 mL 3    pantoprazole (ProtoNix) 40 mg EC tablet TAKE ONE TABLET BY MOUTH ONCE DAILY. DO NOT CRUSH, CHEW OR SPLIT 90 tablet 1    PARoxetine (Paxil) 20 mg tablet Take 1 tablet (20 mg) by mouth once daily in the morning. 90 tablet 3    pen needle, diabetic (Sure Comfort Pen Needle) 32 gauge x 5/32\" needle USE ONE EVERY  each 1    vitamins A,C,E-zinc-copper (PreserVision AREDS) 2,148 mcg-113 mg-45 mg-17.4mg tablet Take 1 tablet by mouth once daily.      gabapentin (Neurontin) 100 mg capsule Take 1 capsule (100 mg) by mouth once daily. (Patient not taking: Reported on 2/12/2025) 30 capsule 1     No current facility-administered medications for this visit.       Review of Systems    REVIEW OF SYSTEMS  GENERAL:  Negative for malaise, significant " weight loss, fever  CARDIOVASCULAR: leg swelling   MUSCULOSKELETAL:  Negative for joint pain or swelling, back pain, and muscle pain.  SKIN:  Negative for lesions, rash, and itching  PSYCH:  Negative for sleep disturbance, mood disorder and recent psychosocial stressors  NEURO: Negative, denies any burning, tingling or numbness     Objective:  frail with walker today   Vasc: DP and PT pulses are palpable bilateral.  CFT is less than 3 seconds bilateral.  Skin temperature is warm to cool proximal to distal bilateral.  No edema     Neuro:  Light touch is intact to the foot bilateral.  No clonus     Derm: Nails 1-5 bilateral are thickened, elongated and crumbly with subungual debris. Skin is supple with normal texture and turgor noted.  Webspaces are clean, dry and intact bilateral.  There are no hyperkeratoses, ulcerations, verruca or other lesions noted.      Ortho: Muscle strength is 5/5 for all pedal groups tested.  Ankle joint, subtalar joint, 1st MPJ and lesser MPJ ROM is full and without pain or crepitus.  The foot type is rectus bilateral off weight bearing.  There are no structural deformities noted.    Assessment/Plan     Painful nail mycosis  DM    Toenails are debrided in length and thickness to avoid infection and for pain relief  Nellie Gutierrez-Valorie, UYN  [address] [place] [address]

## 2025-04-02 ENCOUNTER — APPOINTMENT (OUTPATIENT)
Dept: GASTROENTEROLOGY | Facility: CLINIC | Age: OVER 89
End: 2025-04-02
Payer: MEDICARE

## 2025-04-02 VITALS
DIASTOLIC BLOOD PRESSURE: 72 MMHG | SYSTOLIC BLOOD PRESSURE: 121 MMHG | OXYGEN SATURATION: 95 % | HEIGHT: 66 IN | BODY MASS INDEX: 20.73 KG/M2 | WEIGHT: 129 LBS | HEART RATE: 85 BPM

## 2025-04-02 DIAGNOSIS — C18.2 MALIGNANT NEOPLASM OF ASCENDING COLON (MULTI): ICD-10-CM

## 2025-04-02 DIAGNOSIS — L29.9 PRURITUS: ICD-10-CM

## 2025-04-02 DIAGNOSIS — R19.7 DIARRHEA, UNSPECIFIED TYPE: Primary | ICD-10-CM

## 2025-04-02 DIAGNOSIS — Z90.49 S/P RIGHT HEMICOLECTOMY: ICD-10-CM

## 2025-04-02 PROCEDURE — 1159F MED LIST DOCD IN RCRD: CPT | Performed by: STUDENT IN AN ORGANIZED HEALTH CARE EDUCATION/TRAINING PROGRAM

## 2025-04-02 PROCEDURE — 1157F ADVNC CARE PLAN IN RCRD: CPT | Performed by: STUDENT IN AN ORGANIZED HEALTH CARE EDUCATION/TRAINING PROGRAM

## 2025-04-02 PROCEDURE — 1123F ACP DISCUSS/DSCN MKR DOCD: CPT | Performed by: STUDENT IN AN ORGANIZED HEALTH CARE EDUCATION/TRAINING PROGRAM

## 2025-04-02 PROCEDURE — G2211 COMPLEX E/M VISIT ADD ON: HCPCS | Performed by: STUDENT IN AN ORGANIZED HEALTH CARE EDUCATION/TRAINING PROGRAM

## 2025-04-02 PROCEDURE — 99214 OFFICE O/P EST MOD 30 MIN: CPT | Performed by: STUDENT IN AN ORGANIZED HEALTH CARE EDUCATION/TRAINING PROGRAM

## 2025-04-02 PROCEDURE — 1036F TOBACCO NON-USER: CPT | Performed by: STUDENT IN AN ORGANIZED HEALTH CARE EDUCATION/TRAINING PROGRAM

## 2025-04-02 NOTE — PROGRESS NOTES
"Subjective     History of Present Illness:   Lupis Jackson is a 89 y.o. female with hx of CAD on ASA, colorectal cancer s/p right hemicolectomy in 10/2023, T2DM on insulin, HLD who presents for follow up of post-surgical diarrhea.      She presents for follow up today and states she is feeling well. She has had resolution of her diarrhea. She states the questran has bene a miracle and has allowed her to leave her home without issues.       With regards to her hx, She was previously evaluated Dr. Geronimo in May 2023 for complaints of diarrhea as well. At that time colonoscopy was completed during which ascending colon mass was identified.     Patient states since her surgery in October her diarrhea worsened.  She states she has up to 7 or 8 bowel movements in the morning after eating breakfast and then she is \"free\" for the afternoon.  She has no blood in her stool.  She has no abdominal pain.  She has no weight loss.  She trialed Imodium 2-3 times a day without relief.    she has declined surveillance colonoscopies and imaging during most recent follow-up visit in July of this year.    Past Medical History   has a past medical history of Abnormal mammogram (10/09/2023), Breast cancer, Cataract, Colorectal cancer (Multi), Coronary artery disease, Dizziness, Essential (primary) hypertension (04/07/2022), HL (hearing loss), Hyperlipidemia, unspecified (07/26/2021), Hypothyroidism, Myocardial infarction (Multi), Presence of coronary angioplasty implant and graft (07/27/2020), Stage 3a chronic kidney disease (Multi) (01/16/2024), Type 2 diabetes mellitus without complications (12/15/2022), and Vitamin D deficiency, unspecified (04/22/2019).     Social History   reports that she quit smoking about 49 years ago. Her smoking use included cigarettes. She has been exposed to tobacco smoke. She has never used smokeless tobacco. She reports that she does not drink alcohol and does not use drugs.     Family History  family history " "includes No Known Problems in her father and mother.     Allergies  No Known Allergies    Medications  Current Outpatient Medications   Medication Instructions    alendronate (FOSAMAX) 70 mg, oral, Every 7 days, Take in the morning with a full glass of water, on an empty stomach, and do not take anything else by mouth or lie down for the next 30 min.    aspirin 81 mg EC tablet 1 tablet, Daily    atorvastatin (LIPITOR) 10 mg, oral, Every evening    blood sugar diagnostic (OneTouch Ultra Test) strip 2 times daily    cholecalciferol (Vitamin D-3) 50 MCG (2000 UT) tablet 1 tablet, Daily    cholestyramine (Questran) 4 gram packet 4 g, oral, Daily    diclofenac sodium (VOLTAREN) 2 g, Topical, 2 times daily    gabapentin (NEURONTIN) 100 mg, oral, Daily    Lactobacillus acidophilus (Probiotic Acidophilus) 250 million cell capsule 1 capsule, oral, Daily    Lantus Solostar U-100 Insulin 6 Units, subcutaneous, Nightly    levothyroxine (Synthroid, Levoxyl) 112 mcg tablet TAKE ONE TABLET BY MOUTH DAILY    meclizine (ANTIVERT) 25 mg, oral, Every 12 hours PRN    mv-calcium-min-iron fm-FA-vitK (One-A-Day Women's Complete,vK,) 18 mg-400 mcg- 25 mcg tablet 1 tablet, Daily    nitroglycerin (NITROSTAT) 0.4 mg, Every 5 min PRN    nut.tx.gluc intol,lf,soy-fiber (Glucerna 1.5 Maynor) 0.08-1.5 gram-kcal/mL liquid 237 mL, oral, 2 times daily (morning and midday)    pantoprazole (PROTONIX) 40 mg, oral, Daily, DO NOT CRUSH CHEW OR SPLIT    PARoxetine (PAXIL) 20 mg, oral, Every morning    pen needle, diabetic (Sure Comfort Pen Needle) 32 gauge x 5/32\" needle USE ONE EVERY DAY    vitamins A,C,E-zinc-copper (PreserVision AREDS) 2,148 mcg-113 mg-45 mg-17.4mg tablet 1 tablet, Daily        Objective   Visit Vitals  /72   Pulse 85      Physical Exam  Constitutional:       General: She is not in acute distress.     Appearance: Normal appearance.   HENT:      Head: Normocephalic and atraumatic.      Mouth/Throat:      Mouth: Mucous membranes are " moist.   Eyes:      Extraocular Movements: Extraocular movements intact.   Pulmonary:      Effort: Pulmonary effort is normal.      Breath sounds: No stridor. No wheezing.   Abdominal:      General: Abdomen is flat. There is no distension.   Musculoskeletal:         General: Normal range of motion.   Skin:     General: Skin is warm and dry.   Neurological:      General: No focal deficit present.      Mental Status: She is alert.   Psychiatric:         Mood and Affect: Mood normal.         Judgment: Judgment normal.         Assessment/Plan   Lupis Jackson is a 89 y.o. female with hx of CAD on ASA, colorectal cancer s/p right hemicolectomy in 10/2023, T2DM on insulin, HLD who presents for eval of chronic diarrhea since her colon surgery in 10/2023. Since resolved with questran. She feels well. Likely bile acid diarrhea. Continue on current dose of Questran once daily.       Pt favors conservative measures with her age she is not interested in proceeding with surveillance colonoscopy at this time.      Problem List Items Addressed This Visit       Malignant neoplasm of ascending colon (Multi)    Diarrhea - Primary     Other Visit Diagnoses       S/P right hemicolectomy        Pruritus                     Desiree Prakash MD         My final recommendations will be communicated back to the requesting physician by way of shared Medical record or letter to requesting physician via fax.

## 2025-04-10 DIAGNOSIS — E11.69 TYPE 2 DIABETES MELLITUS WITH OTHER SPECIFIED COMPLICATION, WITH LONG-TERM CURRENT USE OF INSULIN: ICD-10-CM

## 2025-04-10 DIAGNOSIS — Z79.4 TYPE 2 DIABETES MELLITUS WITH OTHER SPECIFIED COMPLICATION, WITH LONG-TERM CURRENT USE OF INSULIN: ICD-10-CM

## 2025-04-11 RX ORDER — INSULIN GLARGINE 100 [IU]/ML
6 INJECTION, SOLUTION SUBCUTANEOUS NIGHTLY
Qty: 15 ML | Refills: 0 | Status: SHIPPED | OUTPATIENT
Start: 2025-04-11

## 2025-05-04 DIAGNOSIS — R42 DIZZINESS: ICD-10-CM

## 2025-05-06 RX ORDER — MECLIZINE HCL 12.5 MG 12.5 MG/1
TABLET ORAL
Qty: 40 TABLET | Refills: 0 | Status: SHIPPED | OUTPATIENT
Start: 2025-05-06

## 2025-05-14 ENCOUNTER — APPOINTMENT (OUTPATIENT)
Dept: PRIMARY CARE | Facility: CLINIC | Age: OVER 89
End: 2025-05-14
Payer: MEDICARE

## 2025-05-14 VITALS
WEIGHT: 126 LBS | HEART RATE: 81 BPM | BODY MASS INDEX: 20.25 KG/M2 | DIASTOLIC BLOOD PRESSURE: 68 MMHG | SYSTOLIC BLOOD PRESSURE: 117 MMHG | HEIGHT: 66 IN

## 2025-05-14 DIAGNOSIS — M81.0 AGE-RELATED OSTEOPOROSIS WITHOUT CURRENT PATHOLOGICAL FRACTURE: ICD-10-CM

## 2025-05-14 DIAGNOSIS — E03.9 ADULT HYPOTHYROIDISM: ICD-10-CM

## 2025-05-14 DIAGNOSIS — I25.10 CORONARY ARTERY DISEASE INVOLVING NATIVE CORONARY ARTERY OF NATIVE HEART WITHOUT ANGINA PECTORIS: ICD-10-CM

## 2025-05-14 DIAGNOSIS — E78.2 MIXED HYPERLIPIDEMIA: ICD-10-CM

## 2025-05-14 DIAGNOSIS — E55.9 VITAMIN D DEFICIENCY: ICD-10-CM

## 2025-05-14 DIAGNOSIS — R54 ADVANCED AGE: ICD-10-CM

## 2025-05-14 DIAGNOSIS — E13.69 OTHER SPECIFIED DIABETES MELLITUS WITH OTHER SPECIFIED COMPLICATION, WITH LONG-TERM CURRENT USE OF INSULIN: Primary | ICD-10-CM

## 2025-05-14 DIAGNOSIS — Z79.4 OTHER SPECIFIED DIABETES MELLITUS WITH OTHER SPECIFIED COMPLICATION, WITH LONG-TERM CURRENT USE OF INSULIN: Primary | ICD-10-CM

## 2025-05-14 DIAGNOSIS — Z91.81 HISTORY OF FALLING: ICD-10-CM

## 2025-05-14 PROCEDURE — 1159F MED LIST DOCD IN RCRD: CPT | Performed by: INTERNAL MEDICINE

## 2025-05-14 PROCEDURE — 1160F RVW MEDS BY RX/DR IN RCRD: CPT | Performed by: INTERNAL MEDICINE

## 2025-05-14 PROCEDURE — 99214 OFFICE O/P EST MOD 30 MIN: CPT | Performed by: INTERNAL MEDICINE

## 2025-05-14 PROCEDURE — 1158F ADVNC CARE PLAN TLK DOCD: CPT | Performed by: INTERNAL MEDICINE

## 2025-05-14 PROCEDURE — G2211 COMPLEX E/M VISIT ADD ON: HCPCS | Performed by: INTERNAL MEDICINE

## 2025-05-14 PROCEDURE — 1036F TOBACCO NON-USER: CPT | Performed by: INTERNAL MEDICINE

## 2025-05-14 ASSESSMENT — LIFESTYLE VARIABLES
HAVE YOU OR SOMEONE ELSE BEEN INJURED AS A RESULT OF YOUR DRINKING: NO
HOW OFTEN DO YOU HAVE SIX OR MORE DRINKS ON ONE OCCASION: NEVER
HOW OFTEN DO YOU HAVE A DRINK CONTAINING ALCOHOL: NEVER
HAS A RELATIVE, FRIEND, DOCTOR, OR ANOTHER HEALTH PROFESSIONAL EXPRESSED CONCERN ABOUT YOUR DRINKING OR SUGGESTED YOU CUT DOWN: NO
AUDIT TOTAL SCORE: 0
AUDIT-C TOTAL SCORE: 0
SKIP TO QUESTIONS 9-10: 1
HOW MANY STANDARD DRINKS CONTAINING ALCOHOL DO YOU HAVE ON A TYPICAL DAY: PATIENT DOES NOT DRINK

## 2025-05-14 NOTE — PROGRESS NOTES
Assessment & Plan  Unsteady gait and history of falls.  She experienced falls, raising concerns due to her living situation with stairs. Considering nursing home for supervision and fall risk reduction.  - Discussed nursing home options with her and family.  - Coordinated with nursing home for potential admission and paperwork.  - Advised environmental modifications to reduce fall risk.    Diabetes mellitus  Last hemoglobin A1c test over three months ago. Managing diabetes with insulin, adherent to regimen. Monitoring plan depends on potential nursing home move.  - Order hemoglobin A1c test if she remains in current location for more than a month.  - Review hemoglobin A1c test results in 4-6 weeks if still in area.    Thyroid disorder  Thyroid blood test slightly off. Taking medication regularly on empty stomach.  - Order thyroid function tests if she remains in current location for more than a month.  - Review thyroid function test results in 4-6 weeks if still in area.    Goals of Care  Considering nursing home for better care and supervision. Prefers proximity to family. Family concerned about safety due to falls.  - Discussed goals of care with her and family, focusing on safety and proximity to family.  - Assisted with referral process to nursing home, ensuring paperwork completion.    Follow-up  Follow-up plans depend on decision regarding nursing home move.  - Schedule follow-up appointment in 4-6 weeks if she remains in area.  - Coordinate with nursing home for continuity of care if she moves.       Assessment/Plan     Problem List Items Addressed This Visit       Adult hypothyroidism    Relevant Orders    CBC and Auto Differential    TSH with reflex to Free T4 if abnormal    CAD (coronary artery disease)    Other specified diabetes mellitus with other specified complication, with long-term current use of insulin - Primary    Relevant Orders    Hemoglobin A1C    Hyperlipidemia    Relevant Orders    CBC and  Auto Differential    Comprehensive Metabolic Panel    Vitamin D deficiency    Relevant Orders    Vitamin D 25-Hydroxy,Total (for eval of Vitamin D levels)    Age-related osteoporosis without current pathological fracture    Advanced age       Subjective 0    Patient ID: Lupis Jackson is a 89 y.o. female who presents for Follow-up.    History of Present Illness  Lupis Jackson is a 89 year old female who presents for general checkup and for a referral to a nursing home.    She is seeking a referral to a nursing home closer to her family, considering a facility that offers both independent living and assisted care options. Her family has been involved in researching potential facilities, and she plans to visit one soon.    She has experienced falls at home, which is a concern for her family. She currently lives with her nephew, who is often away due to work commitments. Her family is worried about her safety, especially since she lives in a split-level home, which may have contributed to her leg issues. Her leg only hurts occasionally and not at night. She is able to cook and perform some daily activities, but her family is concerned about her safety and well-being at home.    She has a history of thyroid issues, with her last blood test showing slightly off results. She takes her thyroid medication regularly in the morning on an empty stomach. She also manages her diabetes with insulin and is due for a hemoglobin A1c test, as her last test was over three months ago.    Her caregiver is in the room and able to help with the history.       Surgical History[1]     ROS    Family History[2]   Social History     Socioeconomic History    Marital status:      Spouse name: Not on file    Number of children: Not on file    Years of education: Not on file    Highest education level: Not on file   Occupational History    Not on file   Tobacco Use    Smoking status: Former     Current packs/day: 0.00     Types: Cigarettes     Quit  date:      Years since quittin.4     Passive exposure: Past    Smokeless tobacco: Never   Vaping Use    Vaping status: Never Used   Substance and Sexual Activity    Alcohol use: Never    Drug use: Never    Sexual activity: Defer   Other Topics Concern    Not on file   Social History Narrative    Not on file     Social Drivers of Health     Financial Resource Strain: Low Risk  (10/16/2023)    Overall Financial Resource Strain (CARDIA)     Difficulty of Paying Living Expenses: Not hard at all   Food Insecurity: Not on file   Transportation Needs: No Transportation Needs (2025)    OASIS : Transportation     Lack of Transportation (Medical): No     Lack of Transportation (Non-Medical): No     Patient Unable or Declines to Respond: No   Physical Activity: Not on file   Stress: Not on file   Social Connections: Feeling Socially Integrated (2025)    OASIS : Social Isolation     Frequency of experiencing loneliness or isolation: Never   Intimate Partner Violence: Not on file   Housing Stability: Low Risk  (10/16/2023)    Housing Stability Vital Sign     Unable to Pay for Housing in the Last Year: No     Number of Places Lived in the Last Year: 1     Unstable Housing in the Last Year: No      Patient has no known allergies.   Current Rx[3]       Objective     Vitals:    25 1135   BP: 117/68   Pulse: 81        Physical Exam  Pleasant elderly female,  with no apparent distress. Alert oriented  Some difficulty in hearing but able to communicate.  Exam was done patient is seated.  Skin:  Normal turgor.  No rash.  Head:  Normocephalic, atraumatic.  Eyes:  Pupils are equal, round,.  No pallor of conjunctivae.  Mouth has moist oral mucosa.   Neck:  Supple.  No JVD.  No carotid bruit.  No thyromegaly. No cervical lymphadenopathy.  No clubbing, significant peripheral osteoarthritis noted  Has kyphoscoliosis of the thoracic spine noted  Chest:  Vesicular breathing Bilaterally moderate air entry and  bilaterally clear to auscultation.  No wheezing.  No crackles.  Heart:  Regular rate with intermittent ectopics and rhythm.  S1, S2 positive.  No murmur.  Abdomen:  Soft, left lower quadrant scar is well-healed.  Bowel sounds are positive.  No organomegaly.  Extremities:  Bilaterally no pedal pitting edema.  Able to move both hips  Bilaterally 2+ dorsalis pedis pulses.  No calf tenderness. Homans sign is negative.  Neuro Exam: No focal signs.  Able to walk independently.      Problem List Items Addressed This Visit       Adult hypothyroidism    Relevant Orders    CBC and Auto Differential    TSH with reflex to Free T4 if abnormal    CAD (coronary artery disease)    Other specified diabetes mellitus with other specified complication, with long-term current use of insulin - Primary    Relevant Orders    Hemoglobin A1C    Hyperlipidemia    Relevant Orders    CBC and Auto Differential    Comprehensive Metabolic Panel    Vitamin D deficiency    Relevant Orders    Vitamin D 25-Hydroxy,Total (for eval of Vitamin D levels)    Age-related osteoporosis without current pathological fracture    Advanced age        Orders Placed This Encounter   Procedures    CBC and Auto Differential     Standing Status:   Future     Expected Date:   5/28/2025     Expiration Date:   5/14/2026     Release result to [facility]:   Immediate    Comprehensive Metabolic Panel     Standing Status:   Future     Expected Date:   5/28/2025     Expiration Date:   5/14/2026     Release result to [facility]:   Immediate    Hemoglobin A1C     Standing Status:   Future     Expected Date:   5/28/2025     Expiration Date:   5/14/2026     Release result to [facility]:   Immediate    Vitamin D 25-Hydroxy,Total (for eval of Vitamin D levels)     Standing Status:   Future     Expected Date:   5/28/2025     Expiration Date:   5/14/2026     Release result to [facility]:   Immediate    TSH with reflex to Free T4 if abnormal     Standing Status:   Future     Expected Date:    5/28/2025     Expiration Date:   5/14/2026     Release result to [email or web address]:   Immediate        @LASTLAB[<insert lab base name>:<insert number of results or*for all>,<repeat format for multiple labs>]@  Lab Results   Component Value Date    CHOL 138 02/07/2025    LDLCALC 63 02/07/2025    CHHDL 2.7 02/07/2025       Imaging  No results found.    Cardiology, Vascular, and Other Imaging  No other imaging results found for the past 7 days            This medical note was created with the assistance of artificial intelligence (AI) for documentation purposes. The content has been reviewed and confirmed by the healthcare provider for accuracy and completeness. Patient consented to the use of audio recording and use of AI during their visit. Patient was identified as a fall risk. Risk prevention instructions provided.       [1]   Past Surgical History:  Procedure Laterality Date    CATARACT EXTRACTION      COLON SURGERY      COLONOSCOPY      OTHER SURGICAL HISTORY  12/10/2018    Right mastectomy   [2]   Family History  Problem Relation Name Age of Onset    No Known Problems Mother      No Known Problems Father     [3]   Current Outpatient Medications   Medication Sig Dispense Refill    alendronate (Fosamax) 70 mg tablet Take 1 tablet (70 mg) by mouth every 7 days. Take in the morning with a full glass of water, on an empty stomach, and do not take anything else by mouth or lie down for the next 30 min. 4 tablet 11    aspirin 81 mg EC tablet Take 1 tablet (81 mg) by mouth once daily.      atorvastatin (Lipitor) 10 mg tablet TAKE ONE TABLET BY MOUTH DAILY IN THE EVENING 90 tablet 3    blood sugar diagnostic (OneTouch Ultra Test) strip TEST TWICE DAILY 200 each 1    cholecalciferol (Vitamin D-3) 50 MCG (2000 UT) tablet Take 1 tablet (50 mcg) by mouth once daily.      cholestyramine (Questran) 4 gram packet Take 1 packet (4 g) by mouth once daily. 60 packet 2    diclofenac sodium (Voltaren) 1 % gel Apply 2.25 inches (2 g) topically  "2 times a day. 50 g 1    Lantus Solostar U-100 Insulin 100 unit/mL (3 mL) pen INJECT 6 UNITS UNDER THE SKIN ONCE DAILY AT BEDTIME 15 mL 0    levothyroxine (Synthroid, Levoxyl) 112 mcg tablet TAKE ONE TABLET BY MOUTH DAILY 90 tablet 3    meclizine (Antivert) 12.5 mg tablet TAKE 2 TABLETS (25 MG) BY MOUTH EVERY 12 HOURS IF NEEDED FOR DIZZINESS 40 tablet 0    mv-calcium-min-iron fm-FA-vitK (One-A-Day Women's Complete,vK,) 18 mg-400 mcg- 25 mcg tablet Take 1 tablet by mouth once daily.      nitroglycerin (Nitrostat) 0.4 mg SL tablet Place 1 tablet (0.4 mg) under the tongue every 5 minutes if needed for chest pain.      nut.tx.gluc intol,lf,soy-fiber (Glucerna 1.5 Maynor) 0.08-1.5 gram-kcal/mL liquid Take 237 mL by mouth 2 times daily after breakfast and lunch. 4740 mL 3    pantoprazole (ProtoNix) 40 mg EC tablet TAKE ONE TABLET BY MOUTH ONCE DAILY. DO NOT CRUSH, CHEW OR SPLIT 90 tablet 1    PARoxetine (Paxil) 20 mg tablet Take 1 tablet (20 mg) by mouth once daily in the morning. 90 tablet 3    pen needle, diabetic (Sure Comfort Pen Needle) 32 gauge x 5/32\" needle USE ONE EVERY  each 1    vitamins A,C,E-zinc-copper (PreserVision AREDS) 2,148 mcg-113 mg-45 mg-17.4mg tablet Take 1 tablet by mouth once daily.       No current facility-administered medications for this visit.     "

## 2025-05-22 ENCOUNTER — TELEPHONE (OUTPATIENT)
Dept: PRIMARY CARE | Facility: CLINIC | Age: OVER 89
End: 2025-05-22
Payer: MEDICARE

## 2025-05-22 NOTE — TELEPHONE ENCOUNTER
Liz left voicemail wanting to know if any paperwork was filled out for pt to be placed in nursing home.  Stated that the nursing home is waiting for paperwork to be returned.  I did not see any paperwork in chart.  Please advise.

## 2025-05-29 ENCOUNTER — APPOINTMENT (OUTPATIENT)
Dept: PODIATRY | Facility: CLINIC | Age: OVER 89
End: 2025-05-29
Payer: MEDICARE

## 2025-05-29 DIAGNOSIS — E13.69 OTHER SPECIFIED DIABETES MELLITUS WITH OTHER SPECIFIED COMPLICATION, WITH LONG-TERM CURRENT USE OF INSULIN: ICD-10-CM

## 2025-05-29 DIAGNOSIS — M79.674 PAIN IN TOES OF BOTH FEET: Primary | ICD-10-CM

## 2025-05-29 DIAGNOSIS — Z79.4 OTHER SPECIFIED DIABETES MELLITUS WITH OTHER SPECIFIED COMPLICATION, WITH LONG-TERM CURRENT USE OF INSULIN: ICD-10-CM

## 2025-05-29 DIAGNOSIS — M79.675 PAIN IN TOES OF BOTH FEET: Primary | ICD-10-CM

## 2025-05-29 DIAGNOSIS — B35.1 FUNGAL NAIL INFECTION: ICD-10-CM

## 2025-05-29 PROCEDURE — 11721 DEBRIDE NAIL 6 OR MORE: CPT | Performed by: PODIATRIST

## 2025-05-29 NOTE — PROGRESS NOTES
History Of Present Illness  Lupis Jackson is a 89 y.o. female presenting for diabetic nail care. Patient complains with painful elongated nails. A1C 7.8    PCP Colton Menjivar MD  last visit 5/14/25     Past Medical History  She has a past medical history of Abnormal mammogram (10/09/2023), Breast cancer, Cataract, Colorectal cancer (Multi), Coronary artery disease, Dizziness, Essential (primary) hypertension (04/07/2022), HL (hearing loss), Hyperlipidemia, unspecified (07/26/2021), Hypothyroidism, Myocardial infarction (Multi), Presence of coronary angioplasty implant and graft (07/27/2020), Stage 3a chronic kidney disease (Multi) (01/16/2024), Type 2 diabetes mellitus without complications (12/15/2022), and Vitamin D deficiency, unspecified (04/22/2019).    Surgical History  She has a past surgical history that includes Other surgical history (12/10/2018); Colonoscopy; Cataract extraction; and Colon surgery.     Social History  She reports that she quit smoking about 49 years ago. Her smoking use included cigarettes. She has been exposed to tobacco smoke. She has never used smokeless tobacco. She reports that she does not drink alcohol and does not use drugs.    Family History  Family History   Problem Relation Name Age of Onset    No Known Problems Mother      No Known Problems Father          Allergies  Patient has no known allergies.    Medications  Current Outpatient Medications   Medication Sig Dispense Refill    alendronate (Fosamax) 70 mg tablet Take 1 tablet (70 mg) by mouth every 7 days. Take in the morning with a full glass of water, on an empty stomach, and do not take anything else by mouth or lie down for the next 30 min. 4 tablet 11    aspirin 81 mg EC tablet Take 1 tablet (81 mg) by mouth once daily.      atorvastatin (Lipitor) 10 mg tablet TAKE ONE TABLET BY MOUTH DAILY IN THE EVENING 90 tablet 3    blood sugar diagnostic (OneTouch Ultra Test) strip TEST TWICE DAILY 200 each 1    cholecalciferol (Vitamin D-3)  "50 MCG (2000 UT) tablet Take 1 tablet (50 mcg) by mouth once daily.      cholestyramine (Questran) 4 gram packet Take 1 packet (4 g) by mouth once daily. 60 packet 2    diclofenac sodium (Voltaren) 1 % gel Apply 2.25 inches (2 g) topically 2 times a day. 50 g 1    Lantus Solostar U-100 Insulin 100 unit/mL (3 mL) pen INJECT 6 UNITS UNDER THE SKIN ONCE DAILY AT BEDTIME 15 mL 0    levothyroxine (Synthroid, Levoxyl) 112 mcg tablet TAKE ONE TABLET BY MOUTH DAILY 90 tablet 3    meclizine (Antivert) 12.5 mg tablet TAKE 2 TABLETS (25 MG) BY MOUTH EVERY 12 HOURS IF NEEDED FOR DIZZINESS 40 tablet 0    mv-calcium-min-iron fm-FA-vitK (One-A-Day Women's Complete,vK,) 18 mg-400 mcg- 25 mcg tablet Take 1 tablet by mouth once daily.      nitroglycerin (Nitrostat) 0.4 mg SL tablet Place 1 tablet (0.4 mg) under the tongue every 5 minutes if needed for chest pain.      nut.tx.gluc intol,lf,soy-fiber (Glucerna 1.5 Maynor) 0.08-1.5 gram-kcal/mL liquid Take 237 mL by mouth 2 times daily after breakfast and lunch. 4740 mL 3    pantoprazole (ProtoNix) 40 mg EC tablet TAKE ONE TABLET BY MOUTH ONCE DAILY. DO NOT CRUSH, CHEW OR SPLIT 90 tablet 1    PARoxetine (Paxil) 20 mg tablet Take 1 tablet (20 mg) by mouth once daily in the morning. 90 tablet 3    pen needle, diabetic (Sure Comfort Pen Needle) 32 gauge x 5/32\" needle USE ONE EVERY  each 1    vitamins A,C,E-zinc-copper (PreserVision AREDS) 2,148 mcg-113 mg-45 mg-17.4mg tablet Take 1 tablet by mouth once daily.       No current facility-administered medications for this visit.       Review of Systems    REVIEW OF SYSTEMS  GENERAL:  Negative for malaise, significant weight loss, fever  CARDIOVASCULAR: leg swelling   MUSCULOSKELETAL:  Negative for joint pain or swelling, back pain, and muscle pain.  SKIN:  Negative for lesions, rash, and itching  PSYCH:  Negative for sleep disturbance, mood disorder and recent psychosocial stressors  NEURO: Negative, denies any burning, tingling or " numbness     Objective: Using a cane Vasc: DP and PT pulses are palpable bilateral.  CFT is less than 3 seconds bilateral.  Skin temperature is warm to cool proximal to distal bilateral.  No edema     Neuro:  Light touch is intact to the foot bilateral.  No clonus     Derm: Nails 1-5 bilateral are thickened, elongated and crumbly with subungual debris. Skin is supple with normal texture and turgor noted.  Webspaces are clean, dry and intact bilateral.  There are no hyperkeratoses, ulcerations, verruca or other lesions noted.      Ortho: Muscle strength is 5/5 for all pedal groups tested.  Ankle joint, subtalar joint, 1st MPJ and lesser MPJ ROM is full and without pain or crepitus.  The foot type is rectus bilateral off weight bearing.  There are no structural deformities noted.    Assessment/Plan     Painful nail mycosis  DM    Toenails are debrided in length and thickness to avoid infection and for pain relief  Nellie Gutierrez-Valorie, UYN  [address] [place] [address]

## 2025-06-18 ENCOUNTER — APPOINTMENT (OUTPATIENT)
Dept: PRIMARY CARE | Facility: CLINIC | Age: OVER 89
End: 2025-06-18
Payer: MEDICARE

## 2025-07-29 ENCOUNTER — APPOINTMENT (OUTPATIENT)
Dept: CARDIOLOGY | Facility: CLINIC | Age: OVER 89
End: 2025-07-29
Payer: MEDICARE

## 2025-08-26 ENCOUNTER — APPOINTMENT (OUTPATIENT)
Dept: ENDOCRINOLOGY | Facility: CLINIC | Age: OVER 89
End: 2025-08-26
Payer: MEDICARE

## 2026-03-10 ENCOUNTER — APPOINTMENT (OUTPATIENT)
Dept: ENDOCRINOLOGY | Facility: CLINIC | Age: OVER 89
End: 2026-03-10
Payer: MEDICARE

## (undated) DEVICE — STAPLER, LINEAR, 3.5 60MM, RELOADABLE, BLUE

## (undated) DEVICE — CUTTER, PROXIMATE LINEAR RELOAD, 75MM, BLUE

## (undated) DEVICE — Device

## (undated) DEVICE — LIGASURE, V SEALER/DIVIDER  5MM BLUNT TIP

## (undated) DEVICE — SOLUTION, IRRIGATION, STERILE WATER, 1000 ML, POUR BOTTLE

## (undated) DEVICE — TROCAR, KII OPTICAL BLADELESS 5MM Z THREAD 100MM LNGTH

## (undated) DEVICE — GLOVE, SURGICAL, BIOGEL, 7.5, PF, LATEX, GREEN

## (undated) DEVICE — SUTURE, VICRYL 3-0, PRECISION POINT, PS-2 UNDYED 27 INCH

## (undated) DEVICE — TOWEL PACK, STERILE, 4/PACK, BLUE

## (undated) DEVICE — SOLUTION, IRRIGATION, SODIUM CHLORIDE 0.9%, 1000 ML, POUR BOTTLE

## (undated) DEVICE — TISSUE ADHESIVE, PREMIERPRO EXOFIN, PRECISION PEN HV, 1.0ML

## (undated) DEVICE — TROCAR, OPTICAL, BLADELESS, 12MM, THREADED, 100MM LENGTH

## (undated) DEVICE — DRAIN, PENROSE, 0.5 X 12 IN, LATEX, STERILE

## (undated) DEVICE — CUTTER, PROX LINEAR, 75MM, REG TISSUE, W/ SAFETY LOCK OUT

## (undated) DEVICE — TUBE SET, PNEUMOLAR HEATED, SMOKE EVACU, HIGH-FLOW